# Patient Record
Sex: FEMALE | Race: WHITE | NOT HISPANIC OR LATINO | Employment: OTHER | URBAN - METROPOLITAN AREA
[De-identification: names, ages, dates, MRNs, and addresses within clinical notes are randomized per-mention and may not be internally consistent; named-entity substitution may affect disease eponyms.]

---

## 2017-03-29 ENCOUNTER — OFFICE VISIT (OUTPATIENT)
Dept: INTERNAL MEDICINE | Facility: MEDICAL CENTER | Age: 62
End: 2017-03-29
Payer: MEDICARE

## 2017-03-29 VITALS
DIASTOLIC BLOOD PRESSURE: 72 MMHG | OXYGEN SATURATION: 97 % | HEIGHT: 61 IN | HEART RATE: 78 BPM | BODY MASS INDEX: 22.28 KG/M2 | SYSTOLIC BLOOD PRESSURE: 96 MMHG | WEIGHT: 118 LBS | TEMPERATURE: 97.3 F

## 2017-03-29 DIAGNOSIS — R21 RASH, SKIN: ICD-10-CM

## 2017-03-29 DIAGNOSIS — B36.9 FUNGAL DERMATITIS: ICD-10-CM

## 2017-03-29 DIAGNOSIS — F20.0 PARANOID SCHIZOPHRENIA (HCC): ICD-10-CM

## 2017-03-29 DIAGNOSIS — D05.92 CARCINOMA IN SITU OF LEFT BREAST: ICD-10-CM

## 2017-03-29 PROCEDURE — G8420 CALC BMI NORM PARAMETERS: HCPCS | Mod: GC | Performed by: INTERNAL MEDICINE

## 2017-03-29 PROCEDURE — 3017F COLORECTAL CA SCREEN DOC REV: CPT | Mod: GC | Performed by: INTERNAL MEDICINE

## 2017-03-29 PROCEDURE — 3014F SCREEN MAMMO DOC REV: CPT | Mod: GC | Performed by: INTERNAL MEDICINE

## 2017-03-29 PROCEDURE — G8432 DEP SCR NOT DOC, RNG: HCPCS | Mod: GC | Performed by: INTERNAL MEDICINE

## 2017-03-29 PROCEDURE — 99213 OFFICE O/P EST LOW 20 MIN: CPT | Mod: GE | Performed by: INTERNAL MEDICINE

## 2017-03-29 PROCEDURE — G8482 FLU IMMUNIZE ORDER/ADMIN: HCPCS | Mod: GC | Performed by: INTERNAL MEDICINE

## 2017-03-29 PROCEDURE — 1036F TOBACCO NON-USER: CPT | Mod: GC | Performed by: INTERNAL MEDICINE

## 2017-03-29 RX ORDER — KETOCONAZOLE 20 MG/G
CREAM TOPICAL
Qty: 1 TUBE | Refills: 2 | Status: SHIPPED | OUTPATIENT
Start: 2017-03-29 | End: 2017-05-24 | Stop reason: SDUPTHER

## 2017-03-29 RX ORDER — DOCUSATE CALCIUM 240 MG
CAPSULE ORAL
Refills: 2 | COMMUNITY
Start: 2017-03-03 | End: 2017-03-29

## 2017-03-29 ASSESSMENT — ENCOUNTER SYMPTOMS
CONSTITUTIONAL NEGATIVE: 1
CARDIOVASCULAR NEGATIVE: 1
GASTROINTESTINAL NEGATIVE: 1
RESPIRATORY NEGATIVE: 1
MUSCULOSKELETAL NEGATIVE: 1
EYES NEGATIVE: 1

## 2017-03-29 ASSESSMENT — PATIENT HEALTH QUESTIONNAIRE - PHQ9: CLINICAL INTERPRETATION OF PHQ2 SCORE: 1

## 2017-03-29 ASSESSMENT — PAIN SCALES - GENERAL: PAINLEVEL: NO PAIN

## 2017-03-29 NOTE — MR AVS SNAPSHOT
"        Gena Dykes   3/29/2017 2:30 PM   Office Visit   MRN: [id number]    Department:  [facility] Med - Internal Med   Dept Phone:  [phone]    Description:  Female : 1955   Provider:  Mitch Honeycutt M.D.           Reason for Visit     Gynecologic Exam pap smear       Allergies as of 3/29/2017     Allergen Noted Reactions    Erythromycin 2012       Pt on Clozapine/no erythromycin    Haldol [Haloperidol Lactate] 2012       Does not tolerate    Prolixin [Fluphenazine] 2016         You were diagnosed with     Fungal dermatitis   [2001]       Rash, skin   [569317]       Carcinoma in situ of left breast   [228640]       Paranoid schizophrenia (CMS-HCC)   [856009]         Vital Signs     Blood Pressure Pulse Temperature Height Weight Body Mass Index    96/72 mmHg 78 36.3 °C (97.3 °F) 1.549 m (5' 1\") 53.524 kg (118 lb) 22.31 kg/m2    Oxygen Saturation Breastfeeding? Smoking Status             97% No Former Smoker         Basic Information     Date Of Birth Sex Race Ethnicity Preferred Language    1955 Female White Non- English      Your appointments     May 03, 2017 10:45 AM   ANNUAL EXAM PREVENTATIVE with Mitch Honeycutt M.D.   [facility] Med - Internal Medicine (--)    [address] [place] [id number] [phone]              Problem List              ICD-10-CM Priority Class Noted - Resolved    Paranoid schizophrenia (CMS-HCC) F20.0   2016 - Present    Carcinoma in situ of breast D05.90   2016 - Present    Hypercholesterolemia E78.00   2016 - Present    Loss of weight R63.4   2016 - Present    Rash, skin R21   2016 - Present      Health Maintenance        Date Due Completion Dates    PAP SMEAR 1977 ---    IMM ZOSTER VACCINE 2016 ---    MAMMOGRAM 2017 (Done)    Override on 2016: Done (Result pending at this time.)    IMM DTaP/Tdap/Td Vaccine (2 - Td) 2020   " COLONOSCOPY 6/17/2023 6/17/2013 (Done), 11/20/2012    Override on 6/17/2013: Done (Per pt, her last colonoscopy was about 3 yrs ago. It was wnl.)            Current Immunizations     Influenza Vaccine Quad Inj (Preserved) 10/20/2016    Tdap Vaccine 9/23/2010      Below and/or attached are the medications your provider expects you to take. Review all of your home medications and newly ordered medications with your provider and/or pharmacist. Follow medication instructions as directed by your provider and/or pharmacist. Please keep your medication list with you and share with your provider. Update the information when medications are discontinued, doses are changed, or new medications (including over-the-counter products) are added; and carry medication information at all times in the event of emergency situations     Allergies:  ERYTHROMYCIN - (reactions not documented)     HALDOL - (reactions not documented)     PROLIXIN - (reactions not documented)               Medications  Valid as of: March 29, 2017 -  3:07 PM    Generic Name Brand Name Tablet Size Instructions for use    Ascorbic Acid (Tab) ascorbic acid 500 MG Take 500 mg by mouth every day.        Asenapine Maleate (SL Tab) Asenapine Maleate 10 MG Place 10 mg under tongue 2 times a day.        Cholecalciferol (Tab) cholecalciferol 1000 UNIT Take 1,000 Units by mouth every day.        CloZAPine (Tab) CLOZARIL 100 MG Take 100 mg by mouth 2 times a day. Indications: two tabs every morning and 4 tabs every bedtime        Docusate Sodium (Cap) COLACE 100 MG Take 100 mg by mouth 2 times a day.        Ketoconazole (Cream) NIZORAL 2 % Apply  to affected area(s) every day.        Multiple Vitamins-Minerals (Tab) THERAGRAN-M  Take 1 Tab by mouth every day.        Omega-3 Fatty Acids (Cap) OMEGA 3 FA 1000 MG Take 1,000 mg by mouth 3 times a day, with meals.        Polyethylene Glycol 3350   Take  by mouth.        .                 Medicines prescribed today were sent  to:     [facility] - [place] - [address]    [address] [place] [address]    Phone: [phone] Fax: [phone]    Open 24 Hours?: No      Medication refill instructions:       If your prescription bottle indicates you have medication refills left, it is not necessary to call your provider’s office. Please contact your pharmacy and they will refill your medication.    If your prescription bottle indicates you do not have any refills left, you may request refills at any time through one of the following ways: The online [employer] system (except Urgent Care), by calling your provider’s office, or by asking your pharmacy to contact your provider’s office with a refill request. Medication refills are processed only during regular business hours and may not be available until the next business day. Your provider may request additional information or to have a follow-up visit with you prior to refilling your medication.   *Please Note: Medication refills are assigned a new Rx number when refilled electronically. Your pharmacy may indicate that no refills were authorized even though a new prescription for the same medication is available at the pharmacy. Please request the medicine by name with the pharmacy before contacting your provider for a refill.        Instructions    RTC in 5 wks.   Labs before next visit.           [employer] Status: Patient Declined

## 2017-03-29 NOTE — PROGRESS NOTES
Established Patient    Gena presents today with the following:    CC:  Rash    HPI:    Fungal dermatitis- Ms. Dykes c/o rash on her left thigh.   Rash is non pruritic or not painful.   Rash is erythematous, non pruritic, not painful. She states that she doesn't shower or change clothes after sweating from exercise.      Carcinoma in situ of Breast of the left breast-  Followed by Dr. Cervantes at [facility].   Stopped anastrazole.   Follow up mammogram scheduled next week.  No recurrence on prior mammogram.  Patient will follow up with Dr. Cervantes in October.     Paranoid Schizophrenia- Followed by Psychiatrist.  Jak.  Pt lives in boarding home.  Accompanied by caregiver.    HM:          Colonoscopy: 2012: Hyperplastic polyp removed.           Mammogram- UTD         Tetanus shot: less than 5 yrs ago          Pap smear- Next visit. Patient is not ready for pap this time    Patient Active Problem List    Diagnosis Date Noted   • Rash, skin 06/17/2016   • Paranoid schizophrenia (CMS-[facility]) 04/29/2016   • Carcinoma in situ of breast 04/29/2016   • Hypercholesterolemia 04/29/2016   • Loss of weight 04/29/2016       Current Outpatient Prescriptions   Medication Sig Dispense Refill   • ketoconazole (NIZORAL) 2 % Cream Apply  to affected area(s) every day. 1 Tube 2   • Asenapine Maleate (SAPHRIS) 10 MG SL Tab Place 10 mg under tongue 2 times a day.     • Polyethylene Glycol 3350 (MIRALAX PO) Take  by mouth.     • clozapine (CLOZARIL) 100 MG TABS Take 100 mg by mouth 2 times a day. Indications: two tabs every morning and 4 tabs every bedtime     • vitamin D (CHOLECALCIFEROL) 1000 UNIT TABS Take 1,000 Units by mouth every day.     • docusate sodium (COLACE) 100 MG CAPS Take 100 mg by mouth 2 times a day.     • docosahexanoic acid (OMEGA 3 FA) 1000 MG CAPS Take 1,000 mg by mouth 3 times a day, with meals.     • ascorbic acid (ASCORBIC ACID) 500 MG TABS Take 500 mg by mouth every day.     • therapeutic  "multivitamin-minerals (THERAGRAN-M) TABS Take 1 Tab by mouth every day.       No current facility-administered medications for this visit.       ROS: As per HPI. Additional pertinent symptoms as noted below.      Review of Systems   Constitutional: Negative.    HENT: Negative.    Eyes: Negative.    Respiratory: Negative.    Cardiovascular: Negative.    Gastrointestinal: Negative.    Genitourinary: Negative.    Musculoskeletal: Negative.    Skin: Positive for rash. Negative for itching.   Endo/Heme/Allergies: Negative.        BP 96/72 mmHg  Pulse 78  Temp(Src) 36.3 °C (97.3 °F)  Ht 1.549 m (5' 1\")  Wt 53.524 kg (118 lb)  BMI 22.31 kg/m2  SpO2 97%  Breastfeeding? No    Physical Exam   Constitutional:  oriented to person, place, and time. No distress.   Eyes: Pupils are equal, round, and reactive to light. No scleral icterus.  Neck: Neck supple.  Cardiovascular: Normal rate, regular rhythm and normal heart sounds.  Exam reveals no gallop and no friction rub.  No murmur heard.  Pulmonary/Chest: Breath sounds normal. Chest wall is not dull to percussion.   Musculoskeletal:   no edema.   Abdomen:  Soft, BS+, Non tender, non distended  Neurological: alert and oriented to person, place, and time.   Skin:  Erythematous, round flat rash on the left thigh      Assessment and Plan    1. Fungal dermatitis  - Patient was advised to keep skin clean and dry after exercising  - Will start Ketoconazole  - ketoconazole (NIZORAL) 2 % Cream; Apply  to affected area(s) every day.  Dispense: 1 Tube; Refill: 2  - CTM    2. Carcinoma in situ of left breast  - Completed Anastrazole  - No recurrence on repeat mammogram  - Next mammogram due in 1 wk  - Followed by Dr. Cervantes at [facility]  - Will defer to Dr. Cervantes    4. Paranoid schizophrenia (CMS-HCC)  - Accompanied by her caregiver.   - Lives in boarding home  - Defer to psychiatry  - CTM    5. HCM  HM:          Colonoscopy: 2012: Hyperplastic polyp removed.           Mammogram- " UTD         Tetanus shot: less than 5 yrs ago          Pap smear- Next visit. Patient is not ready for pap this time  - Plan:  Pap next visit          Followup: Return in about 5 weeks (around 5/3/2017) for Long.      Signed by: Mitch Honeycutt M.D.

## 2017-04-04 LAB
ALBUMIN SERPL-MCNC: 3.9 G/DL (ref 3.6–4.8)
ALBUMIN/GLOB SERPL: 1.5 {RATIO} (ref 1.2–2.2)
ALP SERPL-CCNC: 87 IU/L (ref 39–117)
ALT SERPL-CCNC: 8 IU/L (ref 0–32)
AST SERPL-CCNC: 21 IU/L (ref 0–40)
BASOPHILS # BLD AUTO: 0 X10E3/UL (ref 0–0.2)
BASOPHILS NFR BLD AUTO: 1 %
BILIRUB SERPL-MCNC: 0.2 MG/DL (ref 0–1.2)
BUN SERPL-MCNC: 15 MG/DL (ref 8–27)
BUN/CREAT SERPL: 19 (ref 12–28)
CALCIUM SERPL-MCNC: 9 MG/DL (ref 8.7–10.3)
CHLORIDE SERPL-SCNC: 100 MMOL/L (ref 96–106)
CHOLEST SERPL-MCNC: 215 MG/DL (ref 100–199)
CO2 SERPL-SCNC: 23 MMOL/L (ref 18–29)
COMMENT 011824: ABNORMAL
CREAT SERPL-MCNC: 0.78 MG/DL (ref 0.57–1)
EOSINOPHIL # BLD AUTO: 0.5 X10E3/UL (ref 0–0.4)
EOSINOPHIL NFR BLD AUTO: 8 %
ERYTHROCYTE [DISTWIDTH] IN BLOOD BY AUTOMATED COUNT: 14.4 % (ref 12.3–15.4)
GLOBULIN SER CALC-MCNC: 2.6 G/DL (ref 1.5–4.5)
GLUCOSE SERPL-MCNC: 96 MG/DL (ref 65–99)
HCT VFR BLD AUTO: 42.2 % (ref 34–46.6)
HDLC SERPL-MCNC: 69 MG/DL
HGB BLD-MCNC: 13.7 G/DL (ref 11.1–15.9)
IMM GRANULOCYTES # BLD: 0 X10E3/UL (ref 0–0.1)
IMM GRANULOCYTES NFR BLD: 0 %
IMMATURE CELLS  115398: ABNORMAL
LDLC SERPL CALC-MCNC: 135 MG/DL (ref 0–99)
LYMPHOCYTES # BLD AUTO: 1.5 X10E3/UL (ref 0.7–3.1)
LYMPHOCYTES NFR BLD AUTO: 26 %
MCH RBC QN AUTO: 31.1 PG (ref 26.6–33)
MCHC RBC AUTO-ENTMCNC: 32.5 G/DL (ref 31.5–35.7)
MCV RBC AUTO: 96 FL (ref 79–97)
MONOCYTES # BLD AUTO: 0.4 X10E3/UL (ref 0.1–0.9)
MONOCYTES NFR BLD AUTO: 8 %
MORPHOLOGY BLD-IMP: ABNORMAL
NEUTROPHILS # BLD AUTO: 3.3 X10E3/UL (ref 1.4–7)
NEUTROPHILS NFR BLD AUTO: 57 %
NRBC BLD AUTO-RTO: ABNORMAL %
PLATELET # BLD AUTO: 253 X10E3/UL (ref 150–379)
POTASSIUM SERPL-SCNC: 4.3 MMOL/L (ref 3.5–5.2)
PROT SERPL-MCNC: 6.5 G/DL (ref 6–8.5)
RBC # BLD AUTO: 4.4 X10E6/UL (ref 3.77–5.28)
SODIUM SERPL-SCNC: 138 MMOL/L (ref 134–144)
TRIGL SERPL-MCNC: 55 MG/DL (ref 0–149)
TSH SERPL DL<=0.005 MIU/L-ACNC: 1.78 UIU/ML (ref 0.45–4.5)
VLDLC SERPL CALC-MCNC: 11 MG/DL (ref 5–40)
WBC # BLD AUTO: 5.8 X10E3/UL (ref 3.4–10.8)

## 2017-05-03 ENCOUNTER — OFFICE VISIT (OUTPATIENT)
Dept: INTERNAL MEDICINE | Facility: MEDICAL CENTER | Age: 62
End: 2017-05-03
Payer: MEDICARE

## 2017-05-03 VITALS
DIASTOLIC BLOOD PRESSURE: 70 MMHG | SYSTOLIC BLOOD PRESSURE: 100 MMHG | WEIGHT: 118.25 LBS | TEMPERATURE: 97.7 F | BODY MASS INDEX: 22.33 KG/M2 | OXYGEN SATURATION: 95 % | HEART RATE: 97 BPM | HEIGHT: 61 IN

## 2017-05-03 DIAGNOSIS — F20.0 PARANOID SCHIZOPHRENIA (HCC): ICD-10-CM

## 2017-05-03 DIAGNOSIS — B36.9 DERMATITIS FUNGAL: ICD-10-CM

## 2017-05-03 DIAGNOSIS — D05.92 CARCINOMA IN SITU OF LEFT BREAST, UNSPECIFIED TYPE: ICD-10-CM

## 2017-05-03 DIAGNOSIS — K62.89 RECTAL MASS: ICD-10-CM

## 2017-05-03 DIAGNOSIS — Z01.419 ENCOUNTER FOR CERVICAL PAP SMEAR WITH PELVIC EXAM: ICD-10-CM

## 2017-05-03 PROCEDURE — 99000 SPECIMEN HANDLING OFFICE-LAB: CPT | Performed by: INTERNAL MEDICINE

## 2017-05-03 PROCEDURE — G8420 CALC BMI NORM PARAMETERS: HCPCS | Mod: GC | Performed by: INTERNAL MEDICINE

## 2017-05-03 PROCEDURE — 3017F COLORECTAL CA SCREEN DOC REV: CPT | Mod: GC | Performed by: INTERNAL MEDICINE

## 2017-05-03 PROCEDURE — 3014F SCREEN MAMMO DOC REV: CPT | Mod: GC | Performed by: INTERNAL MEDICINE

## 2017-05-03 PROCEDURE — 99214 OFFICE O/P EST MOD 30 MIN: CPT | Mod: 25,GC | Performed by: INTERNAL MEDICINE

## 2017-05-03 PROCEDURE — G8432 DEP SCR NOT DOC, RNG: HCPCS | Mod: GC | Performed by: INTERNAL MEDICINE

## 2017-05-03 PROCEDURE — 1036F TOBACCO NON-USER: CPT | Mod: GC | Performed by: INTERNAL MEDICINE

## 2017-05-03 ASSESSMENT — ENCOUNTER SYMPTOMS
WEIGHT LOSS: 0
CHILLS: 0
ABDOMINAL PAIN: 0
CONSTIPATION: 0
HEADACHES: 0
NEUROLOGICAL NEGATIVE: 1
SHORTNESS OF BREATH: 0
MUSCULOSKELETAL NEGATIVE: 1
FEVER: 0
COUGH: 0
BLOOD IN STOOL: 0
DIARRHEA: 0
EYES NEGATIVE: 1
CARDIOVASCULAR NEGATIVE: 1
PSYCHIATRIC NEGATIVE: 1

## 2017-05-03 NOTE — MR AVS SNAPSHOT
"        Gena Dykes   5/3/2017 10:45 AM   Office Visit   MRN: [id number]    Department:  [facility] Med - Internal Med   Dept Phone:  [phone]    Description:  Female : 1955   Provider:  Mitch Honeycutt M.D.           Reason for Visit     Gynecologic Exam           Allergies as of 5/3/2017     Allergen Noted Reactions    Erythromycin 2012       Pt on Clozapine/no erythromycin    Haldol [Haloperidol Lactate] 2012       Does not tolerate    Prolixin [Fluphenazine] 2016         You were diagnosed with     Rectal mass   [359493]       Dermatitis fungal   [089581]       Healthcare maintenance   [310670]         Vital Signs     Blood Pressure Pulse Temperature Height Weight Body Mass Index    100/70 mmHg 97 36.5 °C (97.7 °F) 1.549 m (5' 0.98\") 53.638 kg (118 lb 4 oz) 22.35 kg/m2    Oxygen Saturation Smoking Status                95% Former Smoker          Basic Information     Date Of Birth Sex Race Ethnicity Preferred Language    1955 Female White Non- English      Your appointments     2017 11:15 AM   Established Patient with Mitch Honeycutt M.D.   [facility] Med - Internal Medicine (--)    [address] [place] [id number] [phone]           You will be receiving a confirmation call a few days before your appointment from our automated call confirmation system.              Problem List              ICD-10-CM Priority Class Noted - Resolved    Paranoid schizophrenia (CMS-HCC) F20.0   2016 - Present    Carcinoma in situ of breast D05.90   2016 - Present    Hypercholesterolemia E78.00   2016 - Present    Loss of weight R63.4   2016 - Present    Rash, skin R21   2016 - Present      Health Maintenance        Date Due Completion Dates    PAP SMEAR 1977 ---    IMM ZOSTER VACCINE 2016 ---    MAMMOGRAM 2017 (Done)    Override on 2016: Done (Result pending at this time.)   " IMM DTaP/Tdap/Td Vaccine (2 - Td) 9/23/2020 9/23/2010    COLONOSCOPY 6/17/2023 6/17/2013 (Done), 11/20/2012    Override on 6/17/2013: Done (Per pt, her last colonoscopy was about 3 yrs ago. It was wnl.)            Current Immunizations     Influenza Vaccine Quad Inj (Preserved) 10/20/2016    Tdap Vaccine 9/23/2010      Below and/or attached are the medications your provider expects you to take. Review all of your home medications and newly ordered medications with your provider and/or pharmacist. Follow medication instructions as directed by your provider and/or pharmacist. Please keep your medication list with you and share with your provider. Update the information when medications are discontinued, doses are changed, or new medications (including over-the-counter products) are added; and carry medication information at all times in the event of emergency situations     Allergies:  ERYTHROMYCIN - (reactions not documented)     HALDOL - (reactions not documented)     PROLIXIN - (reactions not documented)               Medications  Valid as of: May 03, 2017 - 12:19 PM    Generic Name Brand Name Tablet Size Instructions for use    Ascorbic Acid (Tab) ascorbic acid 500 MG Take 500 mg by mouth every day.        Asenapine Maleate (SL Tab) Asenapine Maleate 10 MG Place 10 mg under tongue 2 times a day.        Calcium Carb-Cholecalciferol   Take 1 Tab by mouth 2 Times a Day.        Cholecalciferol (Tab) cholecalciferol 1000 UNIT Take 1,000 Units by mouth every day.        CloZAPine (Tab) CLOZARIL 100 MG Take 100 mg by mouth 2 times a day. Indications: two tabs every morning and 4 tabs every bedtime        Docusate Sodium (Cap) COLACE 100 MG Take 240 mg by mouth 2 times a day.        Ketoconazole (Cream) NIZORAL 2 % Apply  to affected area(s) every day.        Multiple Vitamins-Minerals (Tab) THERAGRAN-M  Take 1 Tab by mouth every day.        Omega-3 Fatty Acids (Cap) OMEGA 3 FA 1000 MG Take 1,560 mg by mouth every day.         Polyethylene Glycol 3350   Take  by mouth.        .                 Medicines prescribed today were sent to:     [facility] - [place] - [address]    [address] [place] [address]    Phone: [phone] Fax: [phone]    Open 24 Hours?: No      Medication refill instructions:       If your prescription bottle indicates you have medication refills left, it is not necessary to call your provider’s office. Please contact your pharmacy and they will refill your medication.    If your prescription bottle indicates you do not have any refills left, you may request refills at any time through one of the following ways: The online [employer] system (except Urgent Care), by calling your provider’s office, or by asking your pharmacy to contact your provider’s office with a refill request. Medication refills are processed only during regular business hours and may not be available until the next business day. Your provider may request additional information or to have a follow-up visit with you prior to refilling your medication.   *Please Note: Medication refills are assigned a new Rx number when refilled electronically. Your pharmacy may indicate that no refills were authorized even though a new prescription for the same medication is available at the pharmacy. Please request the medicine by name with the pharmacy before contacting your provider for a refill.        Your To Do List     Future Labs/Procedures Complete By Expires    THINPREP PAP WITH HPV  As directed 5/3/2018      Referral     A referral request has been sent to our patient care coordination department. Please allow 3-5 business days for us to process this request and contact you either by phone or mail. If you do not hear from us by the 5th business day, please call us at [phone].        Instructions    F          [employer] Status: Patient Declined

## 2017-05-11 LAB
CYTOLOGIST CVX/VAG CYTO: NORMAL
DX ICD CODE: NORMAL
HPV I/H RISK 1 DNA CVX QL PROBE+SIG AMP: NEGATIVE
NOTE  190109: NORMAL
OTHER STN SPEC: NORMAL
PATH REPORT.FINAL DX SPEC: NORMAL
QC REVIEWED BY  191128: NORMAL
STAT OF ADQ CVX/VAG CYTO-IMP: NORMAL

## 2017-05-25 RX ORDER — KETOCONAZOLE 20 MG/G
CREAM TOPICAL
Qty: 30 G | Refills: 0 | Status: SHIPPED | OUTPATIENT
Start: 2017-05-25 | End: 2018-11-05

## 2017-05-25 NOTE — TELEPHONE ENCOUNTER
Last seen: 05/03/17 by Dr. Honeycutt  Next appt: 06/07/17 with Dr. Honeycutt    Was the patient seen in the last year in this department? Yes   Does patient have an active prescription for medications requested? No   Received Request Via: Pharmacy

## 2017-07-03 NOTE — PROGRESS NOTES
"Patient here with her caregiver and emergency contact, Radha.  I was notified by the EKG tech after the pt had left that the patient had bed bugs and bites on her leg.  Supervisor/manager/environmental services notified and appropriate steps taken.  I called Linsey, surgery scheduler, at Dr. Colin' office to update her on patient's status and to notify that patient must be treated before she can return to surgery. She understands and states that she will speak to Dr. Colin. I called patient and spoke to her caregiver Radha. I notified Radha that there was a bed bug that came off of the patient and that she reported bites.  Radha reports that Gena \"told me when we got into the car that she had bed bugs.  She got them from a bag that she brought home from [facility].\"  I told Radha/patient to call MD for further instructions and that patient must be treated before she can return to the hospital for her surgery.  Radha/pt understand.  "

## 2017-07-05 ENCOUNTER — OFFICE VISIT (OUTPATIENT)
Dept: INTERNAL MEDICINE | Facility: MEDICAL CENTER | Age: 62
End: 2017-07-05
Payer: MEDICARE

## 2017-07-05 VITALS
WEIGHT: 118 LBS | BODY MASS INDEX: 22.28 KG/M2 | DIASTOLIC BLOOD PRESSURE: 66 MMHG | HEIGHT: 61 IN | TEMPERATURE: 98.9 F | HEART RATE: 89 BPM | OXYGEN SATURATION: 94 % | RESPIRATION RATE: 16 BRPM | SYSTOLIC BLOOD PRESSURE: 102 MMHG

## 2017-07-05 DIAGNOSIS — R59.0 LYMPHADENOPATHY, INGUINAL: ICD-10-CM

## 2017-07-05 DIAGNOSIS — Z00.8 ENCOUNTER FOR OTHER GENERAL EXAMINATION: ICD-10-CM

## 2017-07-05 DIAGNOSIS — R21 RASH, SKIN: ICD-10-CM

## 2017-07-05 DIAGNOSIS — K62.89 RECTAL MASS: ICD-10-CM

## 2017-07-05 PROCEDURE — 99214 OFFICE O/P EST MOD 30 MIN: CPT | Mod: GC | Performed by: INTERNAL MEDICINE

## 2017-07-05 ASSESSMENT — PAIN SCALES - GENERAL: PAINLEVEL: NO PAIN

## 2017-07-05 NOTE — MR AVS SNAPSHOT
"        Gena Dykes   2017 1:15 PM   Office Visit   MRN: [id number]    Department:  [facility] - Internal Med   Dept Phone:  [phone]    Description:  Female : 1955   Provider:  Song Malin M.D.           Reason for Visit     Bug Bite both legs lower legs      Allergies as of 2017     Allergen Noted Reactions    Erythromycin 2012       Pt on Clozapine/no erythromycin    Haldol [Haloperidol Lactate] 2012       Does not tolerate    Prolixin [Fluphenazine] 2016       hallucinations      You were diagnosed with     Rash, skin   [808673]       Lymphadenopathy, inguinal   [940249]       Rectal mass   [876941]       Encounter for other general examination   [9711878]         Vital Signs     Blood Pressure Pulse Temperature Respirations Height Weight    102/66 mmHg 89 37.2 °C (98.9 °F) 16 1.549 m (5' 1\") 53.524 kg (118 lb)    Body Mass Index Oxygen Saturation Last Menstrual Period Smoking Status          22.31 kg/m2 94% 2000 (Approximate) Former Smoker        Basic Information     Date Of Birth Sex Race Ethnicity Preferred Language    1955 Female White Non- English      Problem List              ICD-10-CM Priority Class Noted - Resolved    Paranoid schizophrenia (CMS-HCC) F20.0   2016 - Present    Carcinoma in situ of breast D05.90   2016 - Present    Hypercholesterolemia E78.00   2016 - Present    Loss of weight R63.4   2016 - Present    Rash, skin R21   2016 - Present      Health Maintenance        Date Due Completion Dates    IMM ZOSTER VACCINE 2016 ---    MAMMOGRAM 2017 (Done)    Override on 2016: Done (Result pending at this time.)    IMM INFLUENZA (1) 2017 10/20/2016    PAP SMEAR 5/3/2020 5/3/2017, 5/3/2017    IMM DTaP/Tdap/Td Vaccine (2 - Td) 2020    COLONOSCOPY 2023 (Done), 2012    Override on 2013: Done (Per pt, her last colonoscopy was about 3 yrs " ago. It was wnl.)            Current Immunizations     Influenza Vaccine Quad Inj (Preserved) 10/20/2016    Tdap Vaccine 9/23/2010      Below and/or attached are the medications your provider expects you to take. Review all of your home medications and newly ordered medications with your provider and/or pharmacist. Follow medication instructions as directed by your provider and/or pharmacist. Please keep your medication list with you and share with your provider. Update the information when medications are discontinued, doses are changed, or new medications (including over-the-counter products) are added; and carry medication information at all times in the event of emergency situations     Allergies:  ERYTHROMYCIN - (reactions not documented)     HALDOL - (reactions not documented)     PROLIXIN - (reactions not documented)               Medications  Valid as of: July 05, 2017 -  2:19 PM    Generic Name Brand Name Tablet Size Instructions for use    Asenapine Maleate (SL Tab) Asenapine Maleate 10 MG Place 10 mg under tongue 2 times a day.        Calcium Carb-Cholecalciferol   Take 1 Tab by mouth 2 Times a Day.        CloZAPine (Tab) CLOZARIL 100 MG Take 100 mg by mouth 2 times a day. Indications: two tabs every morning and 4 tabs every bedtime        Docusate Sodium (Cap) COLACE 100 MG Take 240 mg by mouth 2 times a day.        Ketoconazole (Cream) NIZORAL 2 % APPLY TOPICALLY TO AFFECTED AREA(S) ONCE DAILY        Multiple Vitamins-Minerals (Tab) THERAGRAN-M  Take 1 Tab by mouth every day.        Polyethylene Glycol 3350   Take  by mouth.        .                 Medicines prescribed today were sent to:     [facility] - [place] - [address]    [address] [place] [address]    Phone: [phone] Fax: [phone]    Open 24 Hours?: No      Medication refill instructions:       If your prescription bottle indicates you have medication refills left, it is not necessary to call  "your provider’s office. Please contact your pharmacy and they will refill your medication.    If your prescription bottle indicates you do not have any refills left, you may request refills at any time through one of the following ways: The online [employer] system (except Urgent Care), by calling your provider’s office, or by asking your pharmacy to contact your provider’s office with a refill request. Medication refills are processed only during regular business hours and may not be available until the next business day. Your provider may request additional information or to have a follow-up visit with you prior to refilling your medication.   *Please Note: Medication refills are assigned a new Rx number when refilled electronically. Your pharmacy may indicate that no refills were authorized even though a new prescription for the same medication is available at the pharmacy. Please request the medicine by name with the pharmacy before contacting your provider for a refill.        Instructions    Please buy at [facility], \"sting nitish to apply to skin lesions          MyChart Status: Patient Declined        "

## 2017-07-05 NOTE — PROGRESS NOTES
Established Patient    Gena presents today with the following:    CC: pin point rash on ventral part of both of legs, Surgical Clearance    HPI: 61 Y old female with past medical history of breast cancer, schizophrenia, and chronic constipation, who presented to the clinic for consultation about a pin point rash on the ventral part of both of her legs and also for surgical clearance. The patient has no itching, bleeding or any other specific complaint, she want to get a surgical clearance for having a bilateral inguinal lymph node biopsy on friday 7/7/2017. The patient feels fine and she is update on her follow up with her physicians, the patient denies chest pain, respiratory difficulty, headache, vision problem, altered bowel motion or difficulty in urination.    Patient Active Problem List    Diagnosis Date Noted   • Rash, skin 06/17/2016   • Paranoid schizophrenia (CMS-HCC) 04/29/2016   • Carcinoma in situ of breast 04/29/2016   • Hypercholesterolemia 04/29/2016   • Loss of weight 04/29/2016       Current Outpatient Prescriptions   Medication Sig Dispense Refill   • ketoconazole (NIZORAL) 2 % Cream APPLY TOPICALLY TO AFFECTED AREA(S) ONCE DAILY 30 g 0   • Calcium Carb-Cholecalciferol (CALCIUM 600 + D PO) Take 1 Tab by mouth 2 Times a Day.     • Asenapine Maleate (SAPHRIS) 10 MG SL Tab Place 10 mg under tongue 2 times a day.     • Polyethylene Glycol 3350 (MIRALAX PO) Take  by mouth.     • clozapine (CLOZARIL) 100 MG TABS Take 100 mg by mouth 2 times a day. Indications: two tabs every morning and 4 tabs every bedtime     • docusate sodium (COLACE) 100 MG CAPS Take 240 mg by mouth 2 times a day.     • therapeutic multivitamin-minerals (THERAGRAN-M) TABS Take 1 Tab by mouth every day.       No current facility-administered medications for this visit.       ROS: As per HPI. Additional pertinent symptoms as noted below.    All others negative    /66 mmHg  Pulse 89  Temp(Src) 37.2 °C (98.9 °F)  Resp 16  " Ht 1.549 m (5' 1\")  Wt 53.524 kg (118 lb)  BMI 22.31 kg/m2  SpO2 94%  LMP 07/03/2000 (Approximate)    Physical Exam   Constitutional:  oriented to person, place, and time. No distress.   Eyes: Pupils are equal, round, and reactive to light. No scleral icterus.  Neck: Neck supple. No thyromegaly present.   Cardiovascular: Normal rate, regular rhythm and normal heart sounds.  Exam reveals no gallop and no friction rub.  No murmur heard.  Pulmonary/Chest: Breath sounds normal. Chest wall is not dull to percussion.   Musculoskeletal:   no edema.   Lymphadenopathy: no cervical adenopathy  Neurological: alert and oriented to person, place, and time.   Skin: No cyanosis. Nails show no clubbing, pin point rash on the ventral part of both legs.  Other: bilateral inguinal lymphadenopathy, perianal cauliflower mass.      Assessment and Plan    1. Rash, skin  pin point rash on the ventral part of both legs. No itching or bleeding, the rash seems to be handled by the patient very well, the rash does not preclude the surgical biopsy.    2. Lymphadenopathy, inguinal  bilateral inguinal lymphadenopathy. Maybe benign perhaps not, it might be because of the perianal mass or because of a metastasis from breast cancer. we are waiting for the pathology results after the surgical biopsy.    3. Rectal mass  perianal cauliflower mass of about 2.5 inch in diameter, it is circular with rough edges and located mainly on the left perianal area, fleshy red in color, no infection or bleeding. This mass might be a human papilloma virus infection or one of its complications as squamous cell carcinoma. The future plan for this mass will be decided according to the results of the inguinal lymph node biopsy.    4. Encounter for other general examination  The patient have been seen and her heart and lung are in good condition, nothing to preclude the patient from having general anesthesia for the surgery.    Followup: Return if symptoms worsen " or fail to improve.      Signed by: Song Malin M.D.

## 2017-07-14 ENCOUNTER — TELEPHONE (OUTPATIENT)
Dept: INTERNAL MEDICINE | Facility: MEDICAL CENTER | Age: 62
End: 2017-07-14

## 2017-07-14 NOTE — TELEPHONE ENCOUNTER
Radha called stating that pt was supposed to have a letter clearing her for the biopsy on Tuesday.  She was seen 7/5/17 for bug bites/rash on legs.  Can you please write clearance.

## 2017-07-14 NOTE — Clinical Note
To whom may concern,    The patient have been examined on 7/5/17. Labs and imaging results were reviewed. Patient has no medical problem that precludes her from having an inguinal lymph node biopsy as scheduled.     July 14, 2017              Song Malin MD  Electronically signed

## 2017-07-18 ENCOUNTER — HOSPITAL ENCOUNTER (OUTPATIENT)
Facility: MEDICAL CENTER | Age: 62
End: 2017-07-18
Attending: SURGERY | Admitting: SURGERY
Payer: MEDICARE

## 2017-07-18 VITALS
DIASTOLIC BLOOD PRESSURE: 78 MMHG | HEART RATE: 75 BPM | OXYGEN SATURATION: 99 % | TEMPERATURE: 98.5 F | BODY MASS INDEX: 20 KG/M2 | RESPIRATION RATE: 15 BRPM | SYSTOLIC BLOOD PRESSURE: 111 MMHG | HEIGHT: 63 IN | WEIGHT: 112.88 LBS

## 2017-07-18 PROBLEM — R59.0 BILATERAL HILAR ADENOPATHY SYNDROME: Status: ACTIVE | Noted: 2017-07-18

## 2017-07-18 PROCEDURE — 700102 HCHG RX REV CODE 250 W/ 637 OVERRIDE(OP)

## 2017-07-18 PROCEDURE — 160009 HCHG ANES TIME/MIN: Performed by: SURGERY

## 2017-07-18 PROCEDURE — 160035 HCHG PACU - 1ST 60 MINS PHASE I: Performed by: SURGERY

## 2017-07-18 PROCEDURE — 700111 HCHG RX REV CODE 636 W/ 250 OVERRIDE (IP)

## 2017-07-18 PROCEDURE — 160025 RECOVERY II MINUTES (STATS): Performed by: SURGERY

## 2017-07-18 PROCEDURE — 501838 HCHG SUTURE GENERAL: Performed by: SURGERY

## 2017-07-18 PROCEDURE — 160047 HCHG PACU  - EA ADDL 30 MINS PHASE II: Performed by: SURGERY

## 2017-07-18 PROCEDURE — 160002 HCHG RECOVERY MINUTES (STAT): Performed by: SURGERY

## 2017-07-18 PROCEDURE — 160046 HCHG PACU - 1ST 60 MINS PHASE II: Performed by: SURGERY

## 2017-07-18 PROCEDURE — 160028 HCHG SURGERY MINUTES - 1ST 30 MINS LEVEL 3: Performed by: SURGERY

## 2017-07-18 PROCEDURE — A9270 NON-COVERED ITEM OR SERVICE: HCPCS

## 2017-07-18 PROCEDURE — 302135 SEQUENTIAL COMPRESSION MACHINE: Performed by: SURGERY

## 2017-07-18 PROCEDURE — 500002 HCHG ADHESIVE, DERMABOND: Performed by: SURGERY

## 2017-07-18 PROCEDURE — 88331 PATH CONSLTJ SURG 1 BLK 1SPC: CPT

## 2017-07-18 PROCEDURE — 160039 HCHG SURGERY MINUTES - EA ADDL 1 MIN LEVEL 3: Performed by: SURGERY

## 2017-07-18 PROCEDURE — 501445 HCHG STAPLER, SKIN DISP: Performed by: SURGERY

## 2017-07-18 PROCEDURE — 160036 HCHG PACU - EA ADDL 30 MINS PHASE I: Performed by: SURGERY

## 2017-07-18 PROCEDURE — 160048 HCHG OR STATISTICAL LEVEL 1-5: Performed by: SURGERY

## 2017-07-18 PROCEDURE — 88305 TISSUE EXAM BY PATHOLOGIST: CPT

## 2017-07-18 PROCEDURE — 502240 HCHG MISC OR SUPPLY RC 0272: Performed by: SURGERY

## 2017-07-18 PROCEDURE — 700101 HCHG RX REV CODE 250

## 2017-07-18 RX ORDER — DIPHENHYDRAMINE HYDROCHLORIDE 50 MG/ML
25 INJECTION INTRAMUSCULAR; INTRAVENOUS EVERY 6 HOURS PRN
Status: DISCONTINUED | OUTPATIENT
Start: 2017-07-18 | End: 2017-07-18 | Stop reason: HOSPADM

## 2017-07-18 RX ORDER — SODIUM CHLORIDE, SODIUM LACTATE, POTASSIUM CHLORIDE, CALCIUM CHLORIDE 600; 310; 30; 20 MG/100ML; MG/100ML; MG/100ML; MG/100ML
1000 INJECTION, SOLUTION INTRAVENOUS
Status: COMPLETED | OUTPATIENT
Start: 2017-07-18 | End: 2017-07-18

## 2017-07-18 RX ORDER — TRAMADOL HYDROCHLORIDE 50 MG/1
TABLET ORAL
Status: COMPLETED
Start: 2017-07-18 | End: 2017-07-18

## 2017-07-18 RX ORDER — BUPIVACAINE HYDROCHLORIDE 5 MG/ML
INJECTION, SOLUTION PERINEURAL
Status: DISCONTINUED | OUTPATIENT
Start: 2017-07-18 | End: 2017-07-18 | Stop reason: HOSPADM

## 2017-07-18 RX ORDER — ONDANSETRON 2 MG/ML
4 INJECTION INTRAMUSCULAR; INTRAVENOUS EVERY 4 HOURS PRN
Status: DISCONTINUED | OUTPATIENT
Start: 2017-07-18 | End: 2017-07-18 | Stop reason: HOSPADM

## 2017-07-18 RX ORDER — LIDOCAINE HYDROCHLORIDE 10 MG/ML
0.5 INJECTION, SOLUTION INFILTRATION; PERINEURAL
Status: DISCONTINUED | OUTPATIENT
Start: 2017-07-18 | End: 2017-07-18 | Stop reason: HOSPADM

## 2017-07-18 RX ORDER — ACETAMINOPHEN 500 MG
TABLET ORAL
Status: COMPLETED
Start: 2017-07-18 | End: 2017-07-18

## 2017-07-18 RX ORDER — TRAMADOL HYDROCHLORIDE 50 MG/1
50 TABLET ORAL EVERY 4 HOURS PRN
Qty: 15 TAB | Refills: 0 | Status: SHIPPED | OUTPATIENT
Start: 2017-07-18 | End: 2018-11-05

## 2017-07-18 RX ORDER — SCOLOPAMINE TRANSDERMAL SYSTEM 1 MG/1
1 PATCH, EXTENDED RELEASE TRANSDERMAL
Status: DISCONTINUED | OUTPATIENT
Start: 2017-07-18 | End: 2017-07-18 | Stop reason: HOSPADM

## 2017-07-18 RX ORDER — HALOPERIDOL 5 MG/ML
1 INJECTION INTRAMUSCULAR EVERY 6 HOURS PRN
Status: DISCONTINUED | OUTPATIENT
Start: 2017-07-18 | End: 2017-07-18

## 2017-07-18 RX ORDER — CELECOXIB 200 MG/1
CAPSULE ORAL
Status: COMPLETED
Start: 2017-07-18 | End: 2017-07-18

## 2017-07-18 RX ORDER — LIDOCAINE AND PRILOCAINE 25; 25 MG/G; MG/G
1 CREAM TOPICAL
Status: DISCONTINUED | OUTPATIENT
Start: 2017-07-18 | End: 2017-07-18 | Stop reason: HOSPADM

## 2017-07-18 RX ORDER — MAGNESIUM HYDROXIDE 1200 MG/15ML
LIQUID ORAL
Status: DISCONTINUED | OUTPATIENT
Start: 2017-07-18 | End: 2017-07-18 | Stop reason: HOSPADM

## 2017-07-18 RX ORDER — DEXAMETHASONE SODIUM PHOSPHATE 4 MG/ML
4 INJECTION, SOLUTION INTRA-ARTICULAR; INTRALESIONAL; INTRAMUSCULAR; INTRAVENOUS; SOFT TISSUE
Status: DISCONTINUED | OUTPATIENT
Start: 2017-07-18 | End: 2017-07-18 | Stop reason: HOSPADM

## 2017-07-18 RX ORDER — POLYETHYLENE GLYCOL 3350 17 G/17G
17 POWDER, FOR SOLUTION ORAL
COMMUNITY

## 2017-07-18 RX ADMIN — CELECOXIB 200 MG: 200 CAPSULE ORAL at 07:45

## 2017-07-18 RX ADMIN — TRAMADOL HYDROCHLORIDE 50 MG: 50 TABLET, FILM COATED ORAL at 11:15

## 2017-07-18 RX ADMIN — SODIUM CHLORIDE, SODIUM LACTATE, POTASSIUM CHLORIDE, CALCIUM CHLORIDE 1000 ML: 600; 310; 30; 20 INJECTION, SOLUTION INTRAVENOUS at 07:00

## 2017-07-18 RX ADMIN — ACETAMINOPHEN 500 MG: 500 TABLET, FILM COATED ORAL at 07:45

## 2017-07-18 ASSESSMENT — PAIN SCALES - GENERAL
PAINLEVEL_OUTOF10: 3

## 2017-07-18 NOTE — IP AVS SNAPSHOT
" Home Care Instructions                                                                                                                Name:Gena Dykes  Medical Record Number:[id number]  CSN: [id number]    YOB: 1955   Age: 61 y.o.  Sex: female  HT:1.6 m (5' 3\") WT: 51.2 kg (112 lb 14 oz)          Admit Date: 7/18/2017     Discharge Date:   Today's Date: 7/18/2017  Attending Doctor:  Brock Colin M.D.                  Allergies:  Erythromycin; Haldol; and Prolixin              Follow-up Information     1. Follow up with Brock Colin M.D. In 1 week.    Specialties:  Surgery, Radiology    Why:  and prn    Contact information    [id number]  [address]  [place] [place] [id number] [phone]          Discharge Instructions         ACTIVITY: Rest and take it easy for the first 24 hours.  A responsible adult is recommended to remain with you during that time.  It is normal to feel sleepy.  We encourage you to not do anything that requires balance, judgment or coordination.    MILD FLU-LIKE SYMPTOMS ARE NORMAL. YOU MAY EXPERIENCE GENERALIZED MUSCLE ACHES, THROAT IRRITATION, HEADACHE AND/OR SOME NAUSEA.    FOR 24 HOURS DO NOT:  Drive, operate machinery or run household appliances.  Drink beer or alcoholic beverages.   Make important decisions or sign legal documents.    SPECIAL INSTRUCTIONS:   Follow all instructions given to you by your doctor.    DIET: To avoid nausea, slowly advance diet as tolerated, avoiding spicy or greasy foods for the first day.  Add more substantial food to your diet according to your physician's instructions.  INCREASE FLUIDS AND FIBER TO AVOID CONSTIPATION.    SURGICAL DRESSING/BATHING:   May shower    FOLLOW-UP APPOINTMENT:  A follow-up appointment should be arranged with your doctor in 1-2 weeks; call to schedule.    You should CALL YOUR PHYSICIAN if you develop:  Fever greater than 101 degrees F.  Pain not relieved by medication, or persistent nausea or " vomiting.  Excessive bleeding (blood soaking through dressing) or unexpected drainage from the wound.  Extreme redness or swelling around the incision site, drainage of pus or foul smelling drainage.  Inability to urinate or empty your bladder within 8 hours.  Problems with breathing or chest pain.    You should call [phone] if you develop problems with breathing or chest pain.  If you are unable to contact your doctor or surgical center, you should go to the nearest emergency room or urgent care center.  Physician's telephone #: [phone]    If any questions arise, call your doctor.  If your doctor is not available, please feel free to call the Surgical Center at [phone].  The Center is open Monday through Friday from 7AM to 7PM.  You can also call the [facility] HOTLINE open 24 hours/day, 7 days/week and speak to a nurse at [phone], or toll free at [phone].    A registered nurse may call you a few days after your surgery to see how you are doing after your procedure.    MEDICATIONS: Resume taking daily medication.  Take prescribed pain medication with food.  If no medication is prescribed, you may take non-aspirin pain medication if needed.  PAIN MEDICATION CAN BE VERY CONSTIPATING.  Take a stool softener or laxative such as senokot, pericolace, or milk of magnesia if needed.    Prescription given for Ultram.  Last pain medication given at 11:15 am.    If your physician has prescribed pain medication that includes Acetaminophen (Tylenol), do not take additional Acetaminophen (Tylenol) while taking the prescribed medication.    Depression / Suicide Risk    As you are discharged from this [facility] facility, it is important to learn how to keep safe from harming yourself.    Recognize the warning signs:  · Abrupt changes in personality, positive or negative- including increase in energy   · Giving away possessions  · Change in eating patterns- significant weight changes-  positive or  negative  · Change in sleeping patterns- unable to sleep or sleeping all the time   · Unwillingness or inability to communicate  · Depression  · Unusual sadness, discouragement and loneliness  · Talk of wanting to die  · Neglect of personal appearance   · Rebelliousness- reckless behavior  · Withdrawal from people/activities they love  · Confusion- inability to concentrate     If you or a loved one observes any of these behaviors or has concerns about self-harm, here's what you can do:  · Talk about it- your feelings and reasons for harming yourself  · Remove any means that you might use to hurt yourself (examples: pills, rope, extension cords, firearm)  · Get professional help from the community (Mental Health, Substance Abuse, psychological counseling)  · Do not be alone:Call your Safe Contact- someone whom you trust who will be there for you.  · Call your local CRISIS HOTLINE 267-0536 or 050-872-9432  · Call your local Children's Mobile Crisis Response Team [place] (268) 328-4303 or www.[employer]  · Call the toll free National Suicide Prevention Hotlines   · National Suicide Prevention Lifeline 163-537-QCQL (7753)  · National Hope Line Network 800-SUICIDE (839-1112)       Medication List      START taking these medications        Instructions    Morning Afternoon Evening Bedtime    tramadol 50 MG Tabs   Last time this was given:  50 mg on 7/18/2017 11:15 AM   Commonly known as:  ULTRAM        Take 1 Tab by mouth every four hours as needed.   Dose:  50 mg                          CONTINUE taking these medications        Instructions    Morning Afternoon Evening Bedtime    CALCIUM 600 + D PO        Take 1 Tab by mouth 2 Times a Day.   Dose:  1 Tab                        clozapine 100 MG Tabs   Commonly known as:  CLOZARIL        Take 100 mg by mouth 2 times a day. 200MG in Am and 300MG HS  Indications: two tabs every morning and 4 tabs every bedtime   Dose:  100 mg                        docusate  sodium 100 MG Caps   Commonly known as:  COLACE        Take 240 mg by mouth 2 times a day.   Dose:  240 mg                        ketoconazole 2 % Crea   Commonly known as:  NIZORAL        APPLY TOPICALLY TO AFFECTED AREA(S) ONCE DAILY                        polyethylene glycol/lytes Pack   Commonly known as:  MIRALAX        Take 17 g by mouth every bedtime.   Dose:  17 g                        SAPHRIS 10 MG Subl   Generic drug:  Asenapine Maleate        Place 10 mg under tongue 2 times a day.   Dose:  10 mg                        therapeutic multivitamin-minerals Tabs        Take 1 Tab by mouth every day.   Dose:  1 Tab                             Where to Get Your Medications      You can get these medications from any pharmacy     Bring a paper prescription for each of these medications    - tramadol 50 MG Tabs            Medication Information     Above and/or attached are the medications your physician expects you to take upon discharge. Review all of your home medications and newly ordered medications with your doctor and/or pharmacist. Follow medication instructions as directed by your doctor and/or pharmacist. Please keep your medication list with you and share with your physician. Update the information when medications are discontinued, doses are changed, or new medications (including over-the-counter products) are added; and carry medication information at all times in the event of emergency situations.        Resources     Quit Smoking / Tobacco Use:    I understand the use of any tobacco products increases my chance of suffering from future heart disease or stroke and could cause other illnesses which may shorten my life. Quitting the use of tobacco products is the single most important thing I can do to improve my health. For further information on smoking / tobacco cessation call a Toll Free Quit Line at 1-413.654.4307 (*National Cancer [place]) or 1-305.356.3603 (American Lung Association) or you  can access the web based program at www.lungAnystream.org.    [place] Tobacco Users Help Line:  (648) 154-7647       Toll Free: 1-197.161.6969  Quit Tobacco Program [facility] (877)735-1073    Crisis Hotline:    [place] Crisis Hotline:  1-364-YZBXPJP or 1-747.229.1736    [place] Crisis Hotline:    1-228.263.3191 or 700-324-4488    Discharge Survey:   Thank you for choosing [facility]. We hope we did everything we could to make your hospital stay a pleasant one. You may be receiving a survey and we would appreciate your time and participation in answering the questions. Your input is very valuable to us in our efforts to improve our service to our patients and their families.            Signatures     My signature on this form indicates that:    1. I acknowledge receipt and understanding of these Home Care Instruction.  2. My questions regarding this information have been answered to my satisfaction.  3. I have formulated a plan with my discharge nurse to obtain my prescribed medications for home.    __________________________________      __________________________________                   Patient Signature                                 Guardian/Responsible Adult Signature      __________________________________                 __________       ________                       Nurse Signature                                               Date                 Time

## 2017-07-18 NOTE — OP REPORT
Operative Report    Date: 7/18/2017    Surgeon: Brock Colin M.D.    Assistant: April Wise    Anesthesiologist: Iva Dasilva    Pre-operative Diagnosis:    Schizophrenia  Anal mass  Bilateral groin lymphadenopathy    Post-operative Diagnosis: same    Procedure: excision of inguinal lymphadenopathy bilaterally    Findings: Multiple fusiform mobile 1 cm groin lymph nodes bilaterally. 2 of the largest from each side were excised and sent for permanent pathology to rule out sue metastasis from presumed     Procedure in detail: The patient was identified in the pre-operative holding area and brought to the operating room. Correct side and site were identified. Pre-operative antibiotics of ancef were administered prior to the procedure. Anesthesia was smoothly induced.The patient was then prepped and draped in the usual sterile fashion.    The skin was infiltrated with local anesthetic and a curvilinear incision was made along the lines of Langerhans in the right groin.  The subcutaneous tissue was grasped and the largest lymph node excised using cautery. The specimen was then completely removed and was sent for permanent pathology. Meticulous hemostasis was achieved with electrocautery. This was repeated for the next largest palpable lymph node in the right groin. This again was repeated on the left groin. The area was irrigated and suctioned. The skin was closed in two layers with vicryl and monocryl. Sterile dressings were applied.     The patient was awakened from anesthetic, and was taken to the recovery room in stable condition.    Sponge and needle counts were correct at the end of the case.     Specimen:   Right groin lymph node one and two  Left groin lymph node one and two    EBL: 10mL    Dispo: stable, extubated, to PACU    Brock Colin MD PhD  [facility]  Colon and Rectal Surgeon  [phone]

## 2017-07-18 NOTE — IP AVS SNAPSHOT
7/18/2017    Gena Dykes  [address] [facility] [address]    Dear Gena:    [facility] wants to ensure your discharge home is safe and you or your loved ones have had all of your questions answered regarding your care after you leave the hospital.    Below is a list of resources and contact information should you have any questions regarding your hospital stay, follow-up instructions, or active medical symptoms.    Questions or Concerns Regarding… Contact   Medical Questions Related to Your Discharge  (7 days a week, 8am-5pm) Contact a Nurse Care Coordinator   [phone]   Medical Questions Not Related to Your Discharge  (24 hours a day / 7 days a week)  Contact the Nurse Health Line   [phone]    Medications or Discharge Instructions Refer to your discharge packet   or contact your [facility] Primary Care Provider   [phone]   Follow-up Appointment(s) Schedule your appointment via [employer]   or contact Scheduling [phone]   Billing Review your statement via [employer]  or contact Billing [phone]   Medical Records Review your records via [employer]   or contact Medical Records [phone]     You may receive a telephone call within two days of discharge. This call is to make certain you understand your discharge instructions and have the opportunity to have any questions answered. You can also easily access your medical information, test results and upcoming appointments via the [employer] free online health management tool. You can learn more and sign up at [email or web address]. For assistance setting up your [employer] account, please call [phone].    Once again, we want to ensure your discharge home is safe and that you have a clear understanding of any next steps in your care. If you have any questions or concerns, please do not hesitate to contact us, we are here for you. Thank you for choosing [facility] for your healthcare needs.    Sincerely,    Your [facility] Healthcare Team

## 2017-07-18 NOTE — OR SURGEON
Operative Report    PreOp Diagnosis:   Schizophrenia  Anal mass  Bilateral groin lymphadenopathy    PostOp Diagnosis: Same    Procedure(s):  MASS EXCISION GENERAL FOR INGUINAL LYMPH NODE EXCISION - Wound Class: Clean    Surgeon(s):  Brock Colin M.D.    Anesthesiologist/Type of Anesthesia:  Anesthesiologist: Iva Dasilva M.D./General    Surgical Staff:  Assistant: April Wise  Circulator: Lindsey Hendrix R.N.  Relief Circulator: Nguyen Lovett R.N.  Relief Scrub: Gwen Machado  Scrub Person: Sulema Lynch    Specimens:  Right groin lymph node one and two  Left groin lymph node one and two    Estimated Blood Loss: 10 mL    Findings: Multiple fusiform mobile 1 cm groin lymph nodes bilaterally. 2 of the largest from each side were excised and sent for permanent pathology to rule out sue metastasis from presumed anal squamous cell cancer  Complications: None        7/18/2017 10:10 AM Brock Colin

## 2017-07-18 NOTE — DISCHARGE INSTRUCTIONS
ACTIVITY: Rest and take it easy for the first 24 hours.  A responsible adult is recommended to remain with you during that time.  It is normal to feel sleepy.  We encourage you to not do anything that requires balance, judgment or coordination.    MILD FLU-LIKE SYMPTOMS ARE NORMAL. YOU MAY EXPERIENCE GENERALIZED MUSCLE ACHES, THROAT IRRITATION, HEADACHE AND/OR SOME NAUSEA.    FOR 24 HOURS DO NOT:  Drive, operate machinery or run household appliances.  Drink beer or alcoholic beverages.   Make important decisions or sign legal documents.    SPECIAL INSTRUCTIONS:   Follow all instructions given to you by your doctor.    DIET: To avoid nausea, slowly advance diet as tolerated, avoiding spicy or greasy foods for the first day.  Add more substantial food to your diet according to your physician's instructions.  INCREASE FLUIDS AND FIBER TO AVOID CONSTIPATION.    SURGICAL DRESSING/BATHING:   May shower    FOLLOW-UP APPOINTMENT:  A follow-up appointment should be arranged with your doctor in 1-2 weeks; call to schedule.    You should CALL YOUR PHYSICIAN if you develop:  Fever greater than 101 degrees F.  Pain not relieved by medication, or persistent nausea or vomiting.  Excessive bleeding (blood soaking through dressing) or unexpected drainage from the wound.  Extreme redness or swelling around the incision site, drainage of pus or foul smelling drainage.  Inability to urinate or empty your bladder within 8 hours.  Problems with breathing or chest pain.    You should call 911 if you develop problems with breathing or chest pain.  If you are unable to contact your doctor or surgical center, you should go to the nearest emergency room or urgent care center.  Physician's telephone #: [phone]    If any questions arise, call your doctor.  If your doctor is not available, please feel free to call the Surgical Center at [phone].  The Center is open Monday through Friday from 7AM to 7PM.  You can also call the HEALTH  HOTLINE open 24 hours/day, 7 days/week and speak to a nurse at [phone], or toll free at [phone].    A registered nurse may call you a few days after your surgery to see how you are doing after your procedure.    MEDICATIONS: Resume taking daily medication.  Take prescribed pain medication with food.  If no medication is prescribed, you may take non-aspirin pain medication if needed.  PAIN MEDICATION CAN BE VERY CONSTIPATING.  Take a stool softener or laxative such as senokot, pericolace, or milk of magnesia if needed.    Prescription given for Ultram.  Last pain medication given at 11:15 am.    If your physician has prescribed pain medication that includes Acetaminophen (Tylenol), do not take additional Acetaminophen (Tylenol) while taking the prescribed medication.    Depression / Suicide Risk    As you are discharged from this [facility] facility, it is important to learn how to keep safe from harming yourself.    Recognize the warning signs:  · Abrupt changes in personality, positive or negative- including increase in energy   · Giving away possessions  · Change in eating patterns- significant weight changes-  positive or negative  · Change in sleeping patterns- unable to sleep or sleeping all the time   · Unwillingness or inability to communicate  · Depression  · Unusual sadness, discouragement and loneliness  · Talk of wanting to die  · Neglect of personal appearance   · Rebelliousness- reckless behavior  · Withdrawal from people/activities they love  · Confusion- inability to concentrate     If you or a loved one observes any of these behaviors or has concerns about self-harm, here's what you can do:  · Talk about it- your feelings and reasons for harming yourself  · Remove any means that you might use to hurt yourself (examples: pills, rope, extension cords, firearm)  · Get professional help from the community (Mental Health, Substance Abuse, psychological counseling)  · Do not be alone:Call  your Safe Contact- someone whom you trust who will be there for you.  · Call your local CRISIS HOTLINE 759-6747 or 355-666-2381  · Call your local Children's Mobile Crisis Response Team [place] (692) 838-0747 or www.[employer]  · Call the toll free National Suicide Prevention Hotlines   · National Suicide Prevention Lifeline 744-401-XXMP (6392)  · National [employer] Line Network 800-SUICIDE (047-1870)

## 2017-07-18 NOTE — OR NURSING
Pt doing very well. States she has very mild pain and feels great. Talked with Dr Dasilva about B/P being elevated and she said it was fine and felt it appropriate for pt to be discharged. Pt also would like to go home at this time.

## 2017-08-17 ENCOUNTER — HOSPITAL ENCOUNTER (OUTPATIENT)
Dept: HOSPITAL 8 - OUT | Age: 62
End: 2017-08-17
Attending: SURGERY
Payer: MEDICARE

## 2017-08-17 VITALS — SYSTOLIC BLOOD PRESSURE: 133 MMHG | DIASTOLIC BLOOD PRESSURE: 85 MMHG

## 2017-08-17 VITALS — BODY MASS INDEX: 21.02 KG/M2 | HEIGHT: 63 IN | WEIGHT: 118.61 LBS

## 2017-08-17 DIAGNOSIS — F20.9: ICD-10-CM

## 2017-08-17 DIAGNOSIS — C21.0: Primary | ICD-10-CM

## 2017-08-17 DIAGNOSIS — Z98.890: ICD-10-CM

## 2017-08-17 PROCEDURE — 88331 PATH CONSLTJ SURG 1 BLK 1SPC: CPT

## 2017-08-17 PROCEDURE — 93005 ELECTROCARDIOGRAM TRACING: CPT

## 2017-08-17 PROCEDURE — C9290 INJ, BUPIVACAINE LIPOSOME: HCPCS

## 2017-08-17 PROCEDURE — 88305 TISSUE EXAM BY PATHOLOGIST: CPT

## 2017-08-17 PROCEDURE — 45172 EXC RECT TUM TRANSANAL FULL: CPT

## 2017-09-19 ENCOUNTER — HOSPITAL ENCOUNTER (OUTPATIENT)
Dept: HOSPITAL 8 - ROC | Age: 62
Discharge: HOME | End: 2017-09-19
Attending: RADIOLOGY
Payer: MEDICARE

## 2017-09-19 DIAGNOSIS — F20.0: ICD-10-CM

## 2017-09-19 DIAGNOSIS — C21.1: Primary | ICD-10-CM

## 2017-09-19 DIAGNOSIS — Z87.891: ICD-10-CM

## 2017-09-19 DIAGNOSIS — Z92.3: ICD-10-CM

## 2017-09-19 PROCEDURE — G0463 HOSPITAL OUTPT CLINIC VISIT: HCPCS

## 2017-09-21 DIAGNOSIS — C21.0 ANAL SQUAMOUS CELL CARCINOMA (HCC): ICD-10-CM

## 2017-09-21 RX ORDER — ONDANSETRON 2 MG/ML
4 INJECTION INTRAMUSCULAR; INTRAVENOUS
Status: CANCELLED | OUTPATIENT
Start: 2017-10-02

## 2017-09-21 RX ORDER — SODIUM CHLORIDE 9 MG/ML
INJECTION, SOLUTION INTRAVENOUS CONTINUOUS
Status: CANCELLED | OUTPATIENT
Start: 2017-11-01

## 2017-09-21 RX ORDER — SODIUM CHLORIDE 9 MG/ML
INJECTION, SOLUTION INTRAVENOUS CONTINUOUS
Status: CANCELLED | OUTPATIENT
Start: 2017-10-02

## 2017-09-21 RX ORDER — ONDANSETRON 2 MG/ML
4 INJECTION INTRAMUSCULAR; INTRAVENOUS
Status: CANCELLED | OUTPATIENT
Start: 2017-11-01

## 2017-09-21 RX ORDER — PROCHLORPERAZINE MALEATE 10 MG
10 TABLET ORAL EVERY 6 HOURS PRN
Status: CANCELLED | OUTPATIENT
Start: 2017-10-02

## 2017-09-21 RX ORDER — LIDOCAINE HYDROCHLORIDE 10 MG/ML
0.5 INJECTION, SOLUTION INFILTRATION; PERINEURAL SEE ADMIN INSTRUCTIONS
Status: CANCELLED | OUTPATIENT
Start: 2017-11-01

## 2017-09-21 RX ORDER — ONDANSETRON 8 MG/1
8 TABLET, ORALLY DISINTEGRATING ORAL
Status: CANCELLED | OUTPATIENT
Start: 2017-10-02

## 2017-09-21 RX ORDER — ONDANSETRON 8 MG/1
8 TABLET, ORALLY DISINTEGRATING ORAL
Status: CANCELLED | OUTPATIENT
Start: 2017-11-01

## 2017-09-21 RX ORDER — PROCHLORPERAZINE MALEATE 10 MG
10 TABLET ORAL EVERY 6 HOURS PRN
Status: CANCELLED | OUTPATIENT
Start: 2017-11-01

## 2017-09-21 RX ORDER — LIDOCAINE HYDROCHLORIDE 10 MG/ML
0.5 INJECTION, SOLUTION INFILTRATION; PERINEURAL SEE ADMIN INSTRUCTIONS
Status: CANCELLED | OUTPATIENT
Start: 2017-10-02

## 2017-09-25 ENCOUNTER — APPOINTMENT (OUTPATIENT)
Dept: RADIATION ONCOLOGY | Facility: MEDICAL CENTER | Age: 62
End: 2017-09-25
Attending: RADIOLOGY
Payer: MEDICARE

## 2017-09-29 ENCOUNTER — HOSPITAL ENCOUNTER (OUTPATIENT)
Dept: RADIOLOGY | Facility: MEDICAL CENTER | Age: 62
End: 2017-09-29
Attending: SPECIALIST
Payer: MEDICARE

## 2017-09-29 DIAGNOSIS — C21.1 MALIGNANT NEOPLASM OF ANAL CANAL (HCC): ICD-10-CM

## 2017-09-29 PROCEDURE — C1751 CATH, INF, PER/CENT/MIDLINE: HCPCS

## 2017-09-29 NOTE — PROGRESS NOTES
PICC Insertion Final 3CG    Consents confirmed, vessel patency confirmed with ultrasound. Risks and benefits of procedure explained to patient/family and education regarding central line associated bloodstream infections provided. Questions answered.     PICC placed in RUE per MD order with ultrasound guidance. 5 Fr, DOUBLE lumen PICC placed in BASILIC vein after ONE attempt(s). 1 cc's of 1% lidocaine injected intradermally, 21 gauge microintroducer needle and modified Seldinger technique used. 35 cm catheter inserted with good blood return. Secured at 1cm marker. Each lumen flushed without resistance with 10 mL 0.9% normal saline. PICC line secured with Biopatch and Tegaderm.    PICC placement is confirmed by nurse using 3CG technology. Appropriate waveform printed and placed on chart. PICC line is appropriate for use at this time. Pt tolerated procedure WELL.  Patient condition relayed to unit RN or ordering physician via this post procedure note in the EMR. RUE arm circumference 9 cm proximal to ac:  27 cm.    Ever[employer] Power PICC ref # [id number], Lot # [id number]    All guidewires removed.  Discharge instructions reviewed with patient and family, handout given.

## 2017-10-02 ENCOUNTER — OFFICE VISIT (OUTPATIENT)
Dept: INTERNAL MEDICINE | Facility: MEDICAL CENTER | Age: 62
End: 2017-10-02
Payer: MEDICARE

## 2017-10-02 ENCOUNTER — OUTPATIENT INFUSION SERVICES (OUTPATIENT)
Dept: ONCOLOGY | Facility: MEDICAL CENTER | Age: 62
End: 2017-10-02
Attending: SPECIALIST
Payer: MEDICARE

## 2017-10-02 VITALS
TEMPERATURE: 97 F | WEIGHT: 126.98 LBS | HEART RATE: 83 BPM | BODY MASS INDEX: 23.98 KG/M2 | RESPIRATION RATE: 18 BRPM | OXYGEN SATURATION: 98 % | DIASTOLIC BLOOD PRESSURE: 60 MMHG | SYSTOLIC BLOOD PRESSURE: 125 MMHG | HEIGHT: 61 IN

## 2017-10-02 VITALS
OXYGEN SATURATION: 98 % | SYSTOLIC BLOOD PRESSURE: 128 MMHG | BODY MASS INDEX: 23.56 KG/M2 | TEMPERATURE: 97.8 F | DIASTOLIC BLOOD PRESSURE: 84 MMHG | HEIGHT: 61 IN | WEIGHT: 124.8 LBS | RESPIRATION RATE: 18 BRPM | HEART RATE: 95 BPM

## 2017-10-02 DIAGNOSIS — R21 RASH, SKIN: ICD-10-CM

## 2017-10-02 DIAGNOSIS — F20.0 PARANOID SCHIZOPHRENIA (HCC): ICD-10-CM

## 2017-10-02 DIAGNOSIS — D05.92 CARCINOMA IN SITU OF LEFT BREAST, UNSPECIFIED TYPE: ICD-10-CM

## 2017-10-02 DIAGNOSIS — C21.0 ANAL SQUAMOUS CELL CARCINOMA (HCC): ICD-10-CM

## 2017-10-02 DIAGNOSIS — E78.00 HYPERCHOLESTEROLEMIA: ICD-10-CM

## 2017-10-02 LAB
ALBUMIN SERPL BCP-MCNC: 3.4 G/DL (ref 3.2–4.9)
ALBUMIN/GLOB SERPL: 1 G/DL
ALP SERPL-CCNC: 89 U/L (ref 30–99)
ALT SERPL-CCNC: 5 U/L (ref 2–50)
ANION GAP SERPL CALC-SCNC: 5 MMOL/L (ref 0–11.9)
AST SERPL-CCNC: 16 U/L (ref 12–45)
BASOPHILS # BLD AUTO: 0.6 % (ref 0–1.8)
BASOPHILS # BLD: 0.05 K/UL (ref 0–0.12)
BILIRUB SERPL-MCNC: 0.3 MG/DL (ref 0.1–1.5)
BUN SERPL-MCNC: 17 MG/DL (ref 8–22)
CALCIUM SERPL-MCNC: 9.1 MG/DL (ref 8.5–10.5)
CHLORIDE SERPL-SCNC: 104 MMOL/L (ref 96–112)
CO2 SERPL-SCNC: 23 MMOL/L (ref 20–33)
CREAT SERPL-MCNC: 0.7 MG/DL (ref 0.5–1.4)
EOSINOPHIL # BLD AUTO: 0.42 K/UL (ref 0–0.51)
EOSINOPHIL NFR BLD: 4.6 % (ref 0–6.9)
ERYTHROCYTE [DISTWIDTH] IN BLOOD BY AUTOMATED COUNT: 51.3 FL (ref 35.9–50)
GFR SERPL CREATININE-BSD FRML MDRD: >60 ML/MIN/1.73 M 2
GLOBULIN SER CALC-MCNC: 3.3 G/DL (ref 1.9–3.5)
GLUCOSE SERPL-MCNC: 73 MG/DL (ref 65–99)
HCT VFR BLD AUTO: 37.5 % (ref 37–47)
HGB BLD-MCNC: 12.1 G/DL (ref 12–16)
IMM GRANULOCYTES # BLD AUTO: 0.04 K/UL (ref 0–0.11)
IMM GRANULOCYTES NFR BLD AUTO: 0.4 % (ref 0–0.9)
LYMPHOCYTES # BLD AUTO: 1.26 K/UL (ref 1–4.8)
LYMPHOCYTES NFR BLD: 13.9 % (ref 22–41)
MCH RBC QN AUTO: 30.4 PG (ref 27–33)
MCHC RBC AUTO-ENTMCNC: 32.3 G/DL (ref 33.6–35)
MCV RBC AUTO: 94.2 FL (ref 81.4–97.8)
MONOCYTES # BLD AUTO: 0.62 K/UL (ref 0–0.85)
MONOCYTES NFR BLD AUTO: 6.8 % (ref 0–13.4)
NEUTROPHILS # BLD AUTO: 6.69 K/UL (ref 2–7.15)
NEUTROPHILS NFR BLD: 73.7 % (ref 44–72)
NRBC # BLD AUTO: 0 K/UL
NRBC BLD AUTO-RTO: 0 /100 WBC
PLATELET # BLD AUTO: 169 K/UL (ref 164–446)
PMV BLD AUTO: 9.8 FL (ref 9–12.9)
POTASSIUM SERPL-SCNC: 6.3 MMOL/L (ref 3.6–5.5)
PROT SERPL-MCNC: 6.7 G/DL (ref 6–8.2)
RBC # BLD AUTO: 3.98 M/UL (ref 4.2–5.4)
SODIUM SERPL-SCNC: 132 MMOL/L (ref 135–145)
WBC # BLD AUTO: 9.1 K/UL (ref 4.8–10.8)

## 2017-10-02 PROCEDURE — 80053 COMPREHEN METABOLIC PANEL: CPT

## 2017-10-02 PROCEDURE — 36592 COLLECT BLOOD FROM PICC: CPT

## 2017-10-02 PROCEDURE — 306780 HCHG STAT FOR TRANSFUSION PER CASE

## 2017-10-02 PROCEDURE — 85025 COMPLETE CBC W/AUTO DIFF WBC: CPT

## 2017-10-02 PROCEDURE — 99214 OFFICE O/P EST MOD 30 MIN: CPT | Mod: GC | Performed by: INTERNAL MEDICINE

## 2017-10-02 ASSESSMENT — PAIN SCALES - GENERAL
PAINLEVEL: NO PAIN
PAINLEVEL: NO PAIN

## 2017-10-02 ASSESSMENT — ENCOUNTER SYMPTOMS
SHORTNESS OF BREATH: 0
HEADACHES: 0
CHILLS: 0
ABDOMINAL PAIN: 0
EYES NEGATIVE: 1
DIARRHEA: 0
FEVER: 0
COUGH: 0
SENSORY CHANGE: 0
MUSCULOSKELETAL NEGATIVE: 1
CONSTIPATION: 0
SORE THROAT: 0

## 2017-10-02 ASSESSMENT — PATIENT HEALTH QUESTIONNAIRE - PHQ9: CLINICAL INTERPRETATION OF PHQ2 SCORE: 0

## 2017-10-02 NOTE — PROGRESS NOTES
"Pharmacy Chemotherapy Calculations    Dx: Anal Cancer  Cycle: 1, Day 1 (Previous treatment = NA)  Regimen and Dosing Reference   Fluorouracil 1000 mg/m2 CIVI over 24 hours on Days 1-4 and 29-32 of radiation- dispensed as 4000 mg/m2 over 96 hours  Mitomycin 10 mg/m2 IV Days 1 and 29  35 day cycle for 1 cycle    NCCN guidelines for anal carcinoma V.1.2017  Xiomara M, et al. J Clin Oncol. 1996;14(9):2527-39    /48   Pulse (!) 105   Temp 36.6 °C (97.8 °F)   Resp 18   Ht 1.56 m (5' 1.42\")   Wt 56.3 kg (124 lb 1.9 oz)   LMP 07/03/2000 (Approximate)   SpO2 97%   BMI 23.13 kg/m²  Body surface area is 1.56 meters squared.     10/2/17 ANC ~ 6690     Plt = 169 k Hgb = 12.1     Scr =  0.70      Crcl ~76 ml/min      LFT/TBili = WNL/0.3  Note: K was reported at 6.3, repeated test = 4.4    Mitomycin 10 mg/m2 x 1.56 m2 = 15.6 mg   <5% difference, OK to treat with final dose = 15.6 mg IV    Fluorouracil 4000 mg/m2 x 1.56 m2 = 6240 mg   <5% difference, OK to treat with final dose = 6240 mg IV   Via home infusion pump at 1.3 ml/hr for 96 hours    Manuel Chang, PharmD    "

## 2017-10-02 NOTE — PROGRESS NOTES
Established Patient    Gena presents today with the following:    CC: Follow up    HPI:    Anal cancer- Found to have anal mass on routine pap smear.  Biopsy of the mass showed squamous cell carcinoma.   B/L inguinal LN biopsy negative for malignancy.   She was evaluated by radiation oncologist and oncologist.   She underwent staging CT abdomen/pelvis/chest and it was noted that there is no metastasis in her oncologist note.    CT abdomen/pelvis/chest was done outside facility and no result available.  She will be starting chemotherapy as well as radiation therapy tomorrow.   Denies rectal pain or abdominal pain.  Reports she is moving her bowel.      Carcinoma in situ of breast- Completed therapy under care of Dr. Cervantes.   Mammogram 2017: unremarkable.       Fungal dermatitis- Resolved    DLD- ASCVD risk 2%.  Not a statin benefit group.  Life style modifications such as low fat diet.     Paranoid Schizophrenia-  Followed by Psychiatrist.  On clozapine.               Patient Active Problem List    Diagnosis Date Noted   • Anal squamous cell carcinoma (CMS-HCC) 09/21/2017   • Bilateral hilar adenopathy syndrome 07/18/2017   • Rash, skin 06/17/2016   • Paranoid schizophrenia (CMS-HCC) 04/29/2016   • Carcinoma in situ of breast 04/29/2016   • Hypercholesterolemia 04/29/2016   • Loss of weight 04/29/2016       Current Outpatient Prescriptions   Medication Sig Dispense Refill   • polyethylene glycol/lytes (MIRALAX) Pack Take 17 g by mouth every bedtime.     • tramadol (ULTRAM) 50 MG Tab Take 1 Tab by mouth every four hours as needed. 15 Tab 0   • ketoconazole (NIZORAL) 2 % Cream APPLY TOPICALLY TO AFFECTED AREA(S) ONCE DAILY 30 g 0   • Calcium Carb-Cholecalciferol (CALCIUM 600 + D PO) Take 1 Tab by mouth 2 Times a Day.     • Asenapine Maleate (SAPHRIS) 10 MG SL Tab Place 10 mg under tongue 2 times a day.     • clozapine (CLOZARIL) 100 MG TABS Take 100 mg by mouth 2 times a day. 200MG in Am and 300MG HS   Indications: two tabs every morning and 4 tabs every bedtime     • docusate sodium (COLACE) 100 MG CAPS Take 240 mg by mouth 2 times a day.     • therapeutic multivitamin-minerals (THERAGRAN-M) TABS Take 1 Tab by mouth every day.       No current facility-administered medications for this visit.        ROS: As per HPI. Additional pertinent symptoms as noted below.      Review of Systems   Constitutional: Negative for chills and fever.   HENT: Negative for sore throat.    Eyes: Negative.    Respiratory: Negative for cough and shortness of breath.    Cardiovascular: Negative for chest pain.   Gastrointestinal: Negative for abdominal pain, constipation and diarrhea.   Genitourinary: Negative.  Negative for dysuria.   Musculoskeletal: Negative.    Skin: Negative for itching and rash.   Neurological: Negative for sensory change and headaches.       LMP 07/03/2000 (Approximate)     Physical Exam   Constitutional:  oriented to person, place, and time. No distress.   Eyes: Pupils are equal, round, and reactive to light. No scleral icterus.  Neck: Neck supple.  Left cervical lymphadenopathy  Cardiovascular: Normal rate, regular rhythm and normal heart sounds.  Exam reveals no gallop and no friction rub.  No murmur heard.  Pulmonary/Chest: Breath sounds normal. Chest wall is not dull to percussion.   Musculoskeletal:   no edema.   Abdomen:  Soft, BS+, Non tender, non distended  Lymphadenopathy: Left cervical adenopathy -hard, mobile and about 5 mm in size.   Neurological: alert and oriented to person, place, and time.   Skin: No cyanosis. Nails show no clubbing.        Assessment and Plan    1. Anal squamous cell carcinoma (CMS-HCC)  - Starting chemotherapy and radiation therapy tomorrow  - Patient requesting Influenza vaccine  - However, live influenza vaccine should be given >4 weeks prior to chemotherapy  and inactivated vaccine should be given >2 weeks prior to chemotherapy.    - Since she is starting chemotherapy, will  hold off Influenza vaccination  - Patient was advised about good hand hygiene to decrease risk of Influenza.     2. Carcinoma in situ of left breast, unspecified type  - Completed therapy with Dr. Cervantes  - Repeat mammogram 2017 unremarkable.     3. Hypercholesterolemia  - Low ASCVD risk.  Life style modifications with low fat diet    4. Paranoid schizophrenia (CMS-HCC)  - On clozapine.   - Defer management to psychiatrist.     5. Rash, skin  - Resolved.   - Stop Ketoconazole       Followup: No Follow-up on file.      Signed by: Mitch Honeycutt M.D.

## 2017-10-02 NOTE — PROGRESS NOTES
Pt to infusion services for chemotherapy education.  Pt to receive Mitomycin and 5 FU tomorrow as treatment for anal cancer.  Pt oriented to infusion services and plan of care for the day.  Chemotherapy education folder and drug information sheets given.  Power point presentation given.  PICC line in place, brisk blood return noted.  Labs drawn as ordered.  Pt given tour of infusion services, all questions answered.  Pt left infusion center ambulatory and in good condition in care of family and caregiver.

## 2017-10-03 ENCOUNTER — PATIENT OUTREACH (OUTPATIENT)
Dept: OTHER | Facility: MEDICAL CENTER | Age: 62
End: 2017-10-03

## 2017-10-03 ENCOUNTER — APPOINTMENT (OUTPATIENT)
Dept: ONCOLOGY | Facility: MEDICAL CENTER | Age: 62
End: 2017-10-03
Attending: SPECIALIST
Payer: MEDICARE

## 2017-10-03 VITALS
BODY MASS INDEX: 23.43 KG/M2 | TEMPERATURE: 97.8 F | RESPIRATION RATE: 18 BRPM | OXYGEN SATURATION: 97 % | HEIGHT: 61 IN | SYSTOLIC BLOOD PRESSURE: 115 MMHG | WEIGHT: 124.12 LBS | DIASTOLIC BLOOD PRESSURE: 48 MMHG | HEART RATE: 105 BPM

## 2017-10-03 DIAGNOSIS — C21.0 ANAL SQUAMOUS CELL CARCINOMA (HCC): ICD-10-CM

## 2017-10-03 LAB — POTASSIUM BLD-SCNC: 4.4 MMOL/L (ref 3.6–5.5)

## 2017-10-03 PROCEDURE — 84132 ASSAY OF SERUM POTASSIUM: CPT

## 2017-10-03 PROCEDURE — 700105 HCHG RX REV CODE 258

## 2017-10-03 PROCEDURE — 700111 HCHG RX REV CODE 636 W/ 250 OVERRIDE (IP)

## 2017-10-03 PROCEDURE — 96375 TX/PRO/DX INJ NEW DRUG ADDON: CPT

## 2017-10-03 PROCEDURE — 96413 CHEMO IV INFUSION 1 HR: CPT

## 2017-10-03 PROCEDURE — 700111 HCHG RX REV CODE 636 W/ 250 OVERRIDE (IP): Performed by: SPECIALIST

## 2017-10-03 PROCEDURE — G0498 CHEMO EXTEND IV INFUS W/PUMP: HCPCS

## 2017-10-03 PROCEDURE — 700105 HCHG RX REV CODE 258: Performed by: SPECIALIST

## 2017-10-03 RX ADMIN — SODIUM CHLORIDE 15.6 MG: 9 INJECTION, SOLUTION INTRAVENOUS at 11:42

## 2017-10-03 RX ADMIN — DEXAMETHASONE SODIUM PHOSPHATE 12 MG: 4 INJECTION, SOLUTION INTRAMUSCULAR; INTRAVENOUS at 10:44

## 2017-10-03 RX ADMIN — FLUOROURACIL 6240 MG: 50 INJECTION, SOLUTION INTRAVENOUS at 12:50

## 2017-10-03 RX ADMIN — ONDANSETRON HYDROCHLORIDE 16 MG: 2 SOLUTION INTRAMUSCULAR; INTRAVENOUS at 11:02

## 2017-10-03 ASSESSMENT — PAIN SCALES - GENERAL: PAINLEVEL: NO PAIN

## 2017-10-03 NOTE — PROGRESS NOTES
On 10/3/2017 at 1142 this ONN met with patient, patient's care giver Radha Love, patient's brother Greg Rosenthal, and patient's sister. Patient gave permission for medical providers to speak with patient's brother and patient's caregiver if needed. Patient denies concerns about treatment. Patient rates stress at 0/10. Patient's family and caregiver deny questions or concerns about treatment. Family confirms that patient is going to start receiving radiation at [facility]. Explained that [facility] also has a nurse navigator in case patient, patient family, or caregiver had questions regarding treatment at [facility]. Explained that family or caregiver could call this ONN with questions or concerns. Provided this ONN's business card.

## 2017-10-03 NOTE — PROGRESS NOTES
Patient presents ambulatory with care giver and family for C1D1 of chemotherapy.  Patient reports feeling well and denies any s/s of  Infection at this time.  Labs from 10/2/17 reviewed, K was 6.3, potassium rechecked today and is WNL.  PICC line dressing changed using sterile technique, flushed per protocol, blood return noted, and lab collected per MD order.  Tressa RN navigator at chair side to talk with patient and family.  Pre medication given per MD order with no adverse reaction.  Mitomycin infused per MD order with no complications or adverse reactions.  Home infusion pump video viewed by patient and care giver, consent signed, and home spill kit provided.  5 FU pump connected and functions reviewed with patient and care giver.  Patient to return 10/7/17 for pump disconnect, confirmed with patient and care giver.  Patient left ambulatory with care giver in no distress.

## 2017-10-03 NOTE — PROGRESS NOTES
"Pharmacy Chemotherapy Note    Second Check    Patient Name: ANDRES BANUELOS  MRN: [id number]    Oncologist: Billy    Protocol: OP ANAL FLUOROURACIL  CI + MITOMYCIN + XRT       Fluorouracil 1000 mg/m2 CIV over 24 hours on day 1-4 and 29-32 of radiation  Mitomycin 10 mg/m2 IV push on day 1 and 29   35 day cycle for 1 cycle    *Dosing Reference*  NCCN guidelines for anal carcinoma V.1.2017  Xiomara Lucas al. J Clin. Oncol. 1996:14(9):2527  Savage ARMIJO, et al. DAVE. 2008: 299(16): 6564-21    Dx: Anal Cancer         /48   Pulse (!) 105   Temp 36.6 °C (97.8 °F)   Resp 18   Ht 1.56 m (5' 1.42\")   Wt 56.3 kg (124 lb 1.9 oz)   LMP 07/03/2000 (Approximate)   SpO2 97%   BMI 23.13 kg/m²  Body surface area is 1.56 meters squared.       10/2/2017:  ANC: 6690      WBC: 9.1     Plt: 169k   Hgb/Hct: 12.1/37.5     SCr: 0.7mg/dL  CrCl: 76mL/min    K: 6.3  Na: 132          Glu: 73  LFT's: 16/5/89 TBili = 0.3      Drug Order   (Drug name, dose, route, IV Fluid & volume, frequency, number of doses) Cycle: 1 Day 1      Previous treatment: NEW CHEMO     Medication = Mitomycin  Base Dose= 10 mg/m2  Calc Dose: Base Dose x 1.56 = 15.6 mg  Final Dose = 15.6 mg  Route = IV   Fluid & Volume =  mL  Admin Duration = Over 15-30 min          <5% difference, OK to treat with final dose    Medication = Fluorouracil  Base Dose= 4000 mg/m2  Calc Dose: Base Dose x 1.56 = 6240 mg  Final Dose = 6240 ml  Route = CIV  Fluid & Volume = 124.8 mL (+ 3 ml of overfill) in a CADD cassette  Admin Duration = Over 96 hours          <5% difference, OK to treat with final dose      By my signature below, I confirm this process was performed independently with the BSA and all final chemotherapy dosing calculations congruent. I have reviewed the above chemotherapy order and that my calculation of the final dose and BSA (when applicable) corroborate those calculations of the  pharmacist. Discrepancies of 5% or greater in the written dose have " been addressed and documented within the EPIC Progress notes.    FLORIAN Mittal, PharmD

## 2017-10-03 NOTE — PROGRESS NOTES
Chemotherapy Verification - SECONDARY RN       Height = 156cm  Weight = 56.3kg  BSA = 1.56m2       Medication: Mitomycin  Dose: 10mg/m2  Calculated Dose: 15.6mg                             (In mg/m2, AUC, mg/kg)     Medication: Fluorouracil  Dose: 4000mg/m2  Calculated Dose: 6240mg over 96hrs                            (In mg/m2, AUC, mg/kg)      I confirm that this process was performed independently.

## 2017-10-07 ENCOUNTER — OUTPATIENT INFUSION SERVICES (OUTPATIENT)
Dept: ONCOLOGY | Facility: MEDICAL CENTER | Age: 62
End: 2017-10-07
Attending: INTERNAL MEDICINE
Payer: MEDICARE

## 2017-10-07 VITALS
DIASTOLIC BLOOD PRESSURE: 57 MMHG | RESPIRATION RATE: 18 BRPM | BODY MASS INDEX: 23.81 KG/M2 | SYSTOLIC BLOOD PRESSURE: 105 MMHG | TEMPERATURE: 97 F | HEIGHT: 61 IN | OXYGEN SATURATION: 97 % | WEIGHT: 126.1 LBS | HEART RATE: 94 BPM

## 2017-10-07 DIAGNOSIS — C21.0 ANAL SQUAMOUS CELL CARCINOMA (HCC): ICD-10-CM

## 2017-10-07 PROCEDURE — 96523 IRRIG DRUG DELIVERY DEVICE: CPT

## 2017-10-07 PROCEDURE — 99211 OFF/OP EST MAY X REQ PHY/QHP: CPT

## 2017-10-07 ASSESSMENT — PAIN SCALES - GENERAL: PAINLEVEL: NO PAIN

## 2017-10-08 NOTE — PROGRESS NOTES
Pt returns to infusion center for pump disconnect and PICC line flush.  PICC line flushed and pump disconnected at 4pm; reservoir volume is 0ml.  PICC line flushed with saline per policy, orange cap placed.  Pt left infusion center ambulatory and in good condition in care of caregiver, Radha.  Pt returns to infusion center on Tuesday for PICC line care and Neulasta.  Radha called and updated with plan of care.

## 2017-10-10 ENCOUNTER — OUTPATIENT INFUSION SERVICES (OUTPATIENT)
Dept: ONCOLOGY | Facility: MEDICAL CENTER | Age: 62
End: 2017-10-10
Attending: SPECIALIST
Payer: MEDICARE

## 2017-10-10 VITALS
TEMPERATURE: 97 F | DIASTOLIC BLOOD PRESSURE: 62 MMHG | HEART RATE: 99 BPM | OXYGEN SATURATION: 98 % | RESPIRATION RATE: 18 BRPM | HEIGHT: 61 IN | BODY MASS INDEX: 23.39 KG/M2 | WEIGHT: 123.9 LBS | SYSTOLIC BLOOD PRESSURE: 116 MMHG

## 2017-10-10 DIAGNOSIS — C21.0 ANAL SQUAMOUS CELL CARCINOMA (HCC): ICD-10-CM

## 2017-10-10 LAB
ANION GAP SERPL CALC-SCNC: 8 MMOL/L (ref 0–11.9)
BASOPHILS # BLD AUTO: 0.8 % (ref 0–1.8)
BASOPHILS # BLD: 0.03 K/UL (ref 0–0.12)
BUN SERPL-MCNC: 16 MG/DL (ref 8–22)
CALCIUM SERPL-MCNC: 8.5 MG/DL (ref 8.5–10.5)
CHLORIDE SERPL-SCNC: 100 MMOL/L (ref 96–112)
CO2 SERPL-SCNC: 24 MMOL/L (ref 20–33)
CREAT SERPL-MCNC: 0.79 MG/DL (ref 0.5–1.4)
EOSINOPHIL # BLD AUTO: 0.14 K/UL (ref 0–0.51)
EOSINOPHIL NFR BLD: 3.5 % (ref 0–6.9)
ERYTHROCYTE [DISTWIDTH] IN BLOOD BY AUTOMATED COUNT: 48.4 FL (ref 35.9–50)
GFR SERPL CREATININE-BSD FRML MDRD: >60 ML/MIN/1.73 M 2
GLUCOSE SERPL-MCNC: 212 MG/DL (ref 65–99)
HCT VFR BLD AUTO: 35.2 % (ref 37–47)
HGB BLD-MCNC: 11.6 G/DL (ref 12–16)
IMM GRANULOCYTES # BLD AUTO: 0.04 K/UL (ref 0–0.11)
IMM GRANULOCYTES NFR BLD AUTO: 1 % (ref 0–0.9)
LYMPHOCYTES # BLD AUTO: 0.24 K/UL (ref 1–4.8)
LYMPHOCYTES NFR BLD: 6 % (ref 22–41)
MCH RBC QN AUTO: 31.1 PG (ref 27–33)
MCHC RBC AUTO-ENTMCNC: 33 G/DL (ref 33.6–35)
MCV RBC AUTO: 94.4 FL (ref 81.4–97.8)
MONOCYTES # BLD AUTO: 0.05 K/UL (ref 0–0.85)
MONOCYTES NFR BLD AUTO: 1.3 % (ref 0–13.4)
NEUTROPHILS # BLD AUTO: 3.47 K/UL (ref 2–7.15)
NEUTROPHILS NFR BLD: 87.4 % (ref 44–72)
NRBC # BLD AUTO: 0 K/UL
NRBC BLD AUTO-RTO: 0 /100 WBC
PLATELET # BLD AUTO: 140 K/UL (ref 164–446)
PMV BLD AUTO: 9.6 FL (ref 9–12.9)
POTASSIUM SERPL-SCNC: 4.1 MMOL/L (ref 3.6–5.5)
RBC # BLD AUTO: 3.73 M/UL (ref 4.2–5.4)
SODIUM SERPL-SCNC: 132 MMOL/L (ref 135–145)
WBC # BLD AUTO: 4 K/UL (ref 4.8–10.8)

## 2017-10-10 PROCEDURE — 36592 COLLECT BLOOD FROM PICC: CPT

## 2017-10-10 PROCEDURE — 85025 COMPLETE CBC W/AUTO DIFF WBC: CPT

## 2017-10-10 PROCEDURE — 700111 HCHG RX REV CODE 636 W/ 250 OVERRIDE (IP): Performed by: SPECIALIST

## 2017-10-10 PROCEDURE — 80048 BASIC METABOLIC PNL TOTAL CA: CPT

## 2017-10-10 PROCEDURE — 96372 THER/PROPH/DIAG INJ SC/IM: CPT

## 2017-10-10 RX ADMIN — PEGFILGRASTIM 6 MG: 6 INJECTION SUBCUTANEOUS at 09:12

## 2017-10-10 ASSESSMENT — PAIN SCALES - GENERAL: PAINLEVEL: NO PAIN

## 2017-10-10 NOTE — PROGRESS NOTES
Pt arrived to [facility], ambulatory, for PICC care/labs/neulasta injection. Pt voices no complaints. Neulasta injected into the L-upper arm with no s/sx of adverse reaction. PICC line flushed per policy, positive blood return noted x2 lumens. Labs drawn per MD order. PICC line dressing changed with sterile field. Pt left IS with no s/sx of distress. Follow up appointment confirmed.

## 2017-10-17 ENCOUNTER — PATIENT OUTREACH (OUTPATIENT)
Dept: OTHER | Facility: MEDICAL CENTER | Age: 62
End: 2017-10-17

## 2017-10-17 ENCOUNTER — OUTPATIENT INFUSION SERVICES (OUTPATIENT)
Dept: ONCOLOGY | Facility: MEDICAL CENTER | Age: 62
End: 2017-10-17
Attending: SPECIALIST
Payer: MEDICARE

## 2017-10-17 VITALS
HEIGHT: 61 IN | HEART RATE: 98 BPM | DIASTOLIC BLOOD PRESSURE: 44 MMHG | BODY MASS INDEX: 23.35 KG/M2 | TEMPERATURE: 98.5 F | OXYGEN SATURATION: 98 % | RESPIRATION RATE: 18 BRPM | WEIGHT: 123.68 LBS | SYSTOLIC BLOOD PRESSURE: 105 MMHG

## 2017-10-17 DIAGNOSIS — C21.0 ANAL SQUAMOUS CELL CARCINOMA (HCC): ICD-10-CM

## 2017-10-17 LAB
ANION GAP SERPL CALC-SCNC: 6 MMOL/L (ref 0–11.9)
BASOPHILS # BLD AUTO: 0 % (ref 0–1.8)
BASOPHILS # BLD: 0 K/UL (ref 0–0.12)
BUN SERPL-MCNC: 12 MG/DL (ref 8–22)
CALCIUM SERPL-MCNC: 8.8 MG/DL (ref 8.5–10.5)
CHLORIDE SERPL-SCNC: 103 MMOL/L (ref 96–112)
CO2 SERPL-SCNC: 25 MMOL/L (ref 20–33)
CREAT SERPL-MCNC: 0.67 MG/DL (ref 0.5–1.4)
EOSINOPHIL # BLD AUTO: 0.16 K/UL (ref 0–0.51)
EOSINOPHIL NFR BLD: 0.9 % (ref 0–6.9)
ERYTHROCYTE [DISTWIDTH] IN BLOOD BY AUTOMATED COUNT: 47.9 FL (ref 35.9–50)
GFR SERPL CREATININE-BSD FRML MDRD: >60 ML/MIN/1.73 M 2
GLUCOSE SERPL-MCNC: 155 MG/DL (ref 65–99)
HCT VFR BLD AUTO: 36.1 % (ref 37–47)
HGB BLD-MCNC: 11.9 G/DL (ref 12–16)
LYMPHOCYTES # BLD AUTO: 0.47 K/UL (ref 1–4.8)
LYMPHOCYTES NFR BLD: 2.6 % (ref 22–41)
MANUAL DIFF BLD: NORMAL
MCH RBC QN AUTO: 30.7 PG (ref 27–33)
MCHC RBC AUTO-ENTMCNC: 33 G/DL (ref 33.6–35)
MCV RBC AUTO: 93.3 FL (ref 81.4–97.8)
METAMYELOCYTES NFR BLD MANUAL: 0.9 %
MONOCYTES # BLD AUTO: 0.78 K/UL (ref 0–0.85)
MONOCYTES NFR BLD AUTO: 4.3 % (ref 0–13.4)
MORPHOLOGY BLD-IMP: NORMAL
NEUTROPHILS # BLD AUTO: 16.62 K/UL (ref 2–7.15)
NEUTROPHILS NFR BLD: 87.8 % (ref 44–72)
NEUTS BAND NFR BLD MANUAL: 3.5 % (ref 0–10)
NRBC # BLD AUTO: 0 K/UL
NRBC BLD AUTO-RTO: 0 /100 WBC
PLATELET # BLD AUTO: 63 K/UL (ref 164–446)
PLATELET BLD QL SMEAR: NORMAL
PMV BLD AUTO: 8.8 FL (ref 9–12.9)
POTASSIUM SERPL-SCNC: 4 MMOL/L (ref 3.6–5.5)
RBC # BLD AUTO: 3.87 M/UL (ref 4.2–5.4)
RBC BLD AUTO: PRESENT
SODIUM SERPL-SCNC: 134 MMOL/L (ref 135–145)
TOXIC GRANULES BLD QL SMEAR: SLIGHT
WBC # BLD AUTO: 18.2 K/UL (ref 4.8–10.8)

## 2017-10-17 PROCEDURE — 85027 COMPLETE CBC AUTOMATED: CPT

## 2017-10-17 PROCEDURE — 85007 BL SMEAR W/DIFF WBC COUNT: CPT

## 2017-10-17 PROCEDURE — 80048 BASIC METABOLIC PNL TOTAL CA: CPT

## 2017-10-17 PROCEDURE — 36592 COLLECT BLOOD FROM PICC: CPT

## 2017-10-17 ASSESSMENT — PAIN SCALES - GENERAL: PAINLEVEL: NO PAIN

## 2017-10-17 NOTE — PROGRESS NOTES
Patient presents, with caregiver, for PICC care and lab draw. PICC dressing changed using sterile technique, flushes well with blood drawn as ordered. PICC insertion site and new sterile dressing clean, dry and intact. PICC flushed per protocol and site secured. Patient scheduled for next appointment and released in no acute distress with her caregiver.

## 2017-10-17 NOTE — PROGRESS NOTES
On 10/17/2017 at 0917 this ONN called patient's caregiver Radha Love, no answer, left voicemail with call back number.

## 2017-10-24 ENCOUNTER — OUTPATIENT INFUSION SERVICES (OUTPATIENT)
Dept: ONCOLOGY | Facility: MEDICAL CENTER | Age: 62
End: 2017-10-24
Attending: SPECIALIST
Payer: MEDICARE

## 2017-10-24 VITALS
OXYGEN SATURATION: 98 % | HEART RATE: 88 BPM | HEIGHT: 61 IN | RESPIRATION RATE: 18 BRPM | TEMPERATURE: 98.4 F | BODY MASS INDEX: 23.48 KG/M2 | DIASTOLIC BLOOD PRESSURE: 47 MMHG | SYSTOLIC BLOOD PRESSURE: 101 MMHG | WEIGHT: 124.34 LBS

## 2017-10-24 DIAGNOSIS — C21.0 ANAL SQUAMOUS CELL CARCINOMA (HCC): ICD-10-CM

## 2017-10-24 LAB
ANION GAP SERPL CALC-SCNC: 5 MMOL/L (ref 0–11.9)
BASOPHILS # BLD AUTO: 0.7 % (ref 0–1.8)
BASOPHILS # BLD: 0.05 K/UL (ref 0–0.12)
BUN SERPL-MCNC: 20 MG/DL (ref 8–22)
CALCIUM SERPL-MCNC: 9.1 MG/DL (ref 8.5–10.5)
CHLORIDE SERPL-SCNC: 106 MMOL/L (ref 96–112)
CO2 SERPL-SCNC: 27 MMOL/L (ref 20–33)
CREAT SERPL-MCNC: 0.64 MG/DL (ref 0.5–1.4)
EOSINOPHIL # BLD AUTO: 0.03 K/UL (ref 0–0.51)
EOSINOPHIL NFR BLD: 0.4 % (ref 0–6.9)
ERYTHROCYTE [DISTWIDTH] IN BLOOD BY AUTOMATED COUNT: 49.2 FL (ref 35.9–50)
GFR SERPL CREATININE-BSD FRML MDRD: >60 ML/MIN/1.73 M 2
GLUCOSE SERPL-MCNC: 87 MG/DL (ref 65–99)
HCT VFR BLD AUTO: 33.7 % (ref 37–47)
HGB BLD-MCNC: 11.2 G/DL (ref 12–16)
IMM GRANULOCYTES # BLD AUTO: 0.08 K/UL (ref 0–0.11)
IMM GRANULOCYTES NFR BLD AUTO: 1.1 % (ref 0–0.9)
LYMPHOCYTES # BLD AUTO: 0.43 K/UL (ref 1–4.8)
LYMPHOCYTES NFR BLD: 5.7 % (ref 22–41)
MCH RBC QN AUTO: 31.7 PG (ref 27–33)
MCHC RBC AUTO-ENTMCNC: 33.2 G/DL (ref 33.6–35)
MCV RBC AUTO: 95.5 FL (ref 81.4–97.8)
MONOCYTES # BLD AUTO: 0.44 K/UL (ref 0–0.85)
MONOCYTES NFR BLD AUTO: 5.8 % (ref 0–13.4)
NEUTROPHILS # BLD AUTO: 6.52 K/UL (ref 2–7.15)
NEUTROPHILS NFR BLD: 86.3 % (ref 44–72)
NRBC # BLD AUTO: 0 K/UL
NRBC BLD AUTO-RTO: 0 /100 WBC
PLATELET # BLD AUTO: 113 K/UL (ref 164–446)
PMV BLD AUTO: 9.9 FL (ref 9–12.9)
POTASSIUM SERPL-SCNC: 4.2 MMOL/L (ref 3.6–5.5)
RBC # BLD AUTO: 3.53 M/UL (ref 4.2–5.4)
SODIUM SERPL-SCNC: 138 MMOL/L (ref 135–145)
WBC # BLD AUTO: 7.6 K/UL (ref 4.8–10.8)

## 2017-10-24 PROCEDURE — 85025 COMPLETE CBC W/AUTO DIFF WBC: CPT

## 2017-10-24 PROCEDURE — 36592 COLLECT BLOOD FROM PICC: CPT

## 2017-10-24 PROCEDURE — 80048 BASIC METABOLIC PNL TOTAL CA: CPT

## 2017-10-24 ASSESSMENT — PAIN SCALES - GENERAL: PAINLEVEL: 1=MINIMAL PAIN

## 2017-10-25 NOTE — PROGRESS NOTES
Pt to infusion services ambulatory per self and accompanied by caregiver, Radha.  Pt here for scheduled labs and PICC care.  Plan of care reviewed.  Pt verbalizes understanding.  No complaints or concerns offered.  Pt with dual lumen PICC line to [facility].  PICC flushed with normal saline.  Both lumens flush easily; + blood return verified.  Labs drawn per MD orders.  Blood samples sent to lab.  PICC flushed with normal saline per policy.  PICC dressing changed utilizing sterile technique.  Pt tolerated well.  Line secured.  Pt released to self care and care of caregiver in no apparent distress after completion of treatment, ambulatory.  Future appointments confirmed and list of appointments provided.

## 2017-10-30 ENCOUNTER — TELEPHONE (OUTPATIENT)
Dept: ONCOLOGY | Facility: MEDICAL CENTER | Age: 62
End: 2017-10-30

## 2017-10-30 NOTE — TELEPHONE ENCOUNTER
Called patient to inform her that she missed her lab appointment today.  Spoke with family member who stated pt saw Dr. Cervantes today and had labs drawn in MD office.  Pt to bring in copy of lab results to chemotherapy appointment tomorrow, 10/31.

## 2017-10-31 ENCOUNTER — OUTPATIENT INFUSION SERVICES (OUTPATIENT)
Dept: ONCOLOGY | Facility: MEDICAL CENTER | Age: 62
End: 2017-10-31
Attending: SPECIALIST
Payer: MEDICARE

## 2017-10-31 VITALS
OXYGEN SATURATION: 96 % | BODY MASS INDEX: 23.18 KG/M2 | HEART RATE: 93 BPM | HEIGHT: 61 IN | TEMPERATURE: 99 F | DIASTOLIC BLOOD PRESSURE: 43 MMHG | WEIGHT: 122.8 LBS | SYSTOLIC BLOOD PRESSURE: 108 MMHG | RESPIRATION RATE: 18 BRPM

## 2017-10-31 DIAGNOSIS — C21.0 ANAL SQUAMOUS CELL CARCINOMA (HCC): ICD-10-CM

## 2017-10-31 PROCEDURE — 700105 HCHG RX REV CODE 258: Performed by: SPECIALIST

## 2017-10-31 PROCEDURE — 700111 HCHG RX REV CODE 636 W/ 250 OVERRIDE (IP)

## 2017-10-31 PROCEDURE — 700105 HCHG RX REV CODE 258

## 2017-10-31 PROCEDURE — 700111 HCHG RX REV CODE 636 W/ 250 OVERRIDE (IP): Performed by: SPECIALIST

## 2017-10-31 PROCEDURE — G0498 CHEMO EXTEND IV INFUS W/PUMP: HCPCS

## 2017-10-31 PROCEDURE — 96413 CHEMO IV INFUSION 1 HR: CPT

## 2017-10-31 PROCEDURE — 96375 TX/PRO/DX INJ NEW DRUG ADDON: CPT

## 2017-10-31 RX ORDER — DEXAMETHASONE SODIUM PHOSPHATE 4 MG/ML
12 INJECTION, SOLUTION INTRA-ARTICULAR; INTRALESIONAL; INTRAMUSCULAR; INTRAVENOUS; SOFT TISSUE ONCE
Status: COMPLETED | OUTPATIENT
Start: 2017-10-31 | End: 2017-10-31

## 2017-10-31 RX ORDER — SODIUM CHLORIDE 9 MG/ML
INJECTION, SOLUTION INTRAVENOUS CONTINUOUS
Status: DISCONTINUED | OUTPATIENT
Start: 2017-10-31 | End: 2017-10-31 | Stop reason: HOSPADM

## 2017-10-31 RX ADMIN — SODIUM CHLORIDE: 9 INJECTION, SOLUTION INTRAVENOUS at 16:00

## 2017-10-31 RX ADMIN — DEXAMETHASONE SODIUM PHOSPHATE 12 MG: 4 INJECTION, SOLUTION INTRAMUSCULAR; INTRAVENOUS at 16:01

## 2017-10-31 RX ADMIN — SODIUM CHLORIDE 15.5 MG: 9 INJECTION, SOLUTION INTRAVENOUS at 16:30

## 2017-10-31 RX ADMIN — FLUOROURACIL 6200 MG: 50 INJECTION, SOLUTION INTRAVENOUS at 17:08

## 2017-10-31 RX ADMIN — ONDANSETRON HYDROCHLORIDE 16 MG: 2 INJECTION, SOLUTION INTRAMUSCULAR; INTRAVENOUS at 16:00

## 2017-10-31 ASSESSMENT — PAIN SCALES - GENERAL: PAINLEVEL: NO PAIN

## 2017-10-31 NOTE — PROGRESS NOTES
"Pharmacy Chemotherapy Note    Patient Name: ANDRES BANUELOS  Dx: Anal Cancer         Protocol:  FLUOROURACIL + MITOMYCIN + XRT       *Dosing Reference*  Fluorouracil 1000 mg/m2 CIVI over 24 hours on day 1-4 and 29-32 of radiation  Mitomycin 10 mg/m2 IV push on day 1 and 29  35 day cycle X 1 cycle  NCCN guidelines for anal carcinoma V.1.2017  Xiomara M, et al. J Clin Oncol. 1996:14(9):2527  Savage ARMIJO, et al. DAVE. 2008: 299(16): 1914-21    /43   Pulse 93   Temp 37.2 °C (99 °F)   Resp 18   Ht 1.56 m (5' 1.42\")   Wt 55.7 kg (122 lb 12.7 oz)   LMP 07/03/2000 (Approximate)   SpO2 96%   BMI 22.89 kg/m²  Body surface area is 1.55 meters squared.     10/30/17CCS  ANC ~ 7150     Plt = 168k   Hgb = 12.1   SCr = 0.62 mg/dL  CrCl ~ 75mL/min    K = 4.4  LFT's =AST/ALT/AP = 21/32/150  TBili <0.2      Drug Order   (Drug name, dose, route, IV Fluid & volume, frequency, number of doses) Cycle 1, Day 29      Previous treatment: C1D1 = 10/03/17     Medication = Mitomycin (Mutamycin)  Base Dose= 10 mg/m2  Calc Dose: Base Dose x 1.55 m2 = 15.5 mg  Final Dose = 15.5 mg  Route = IV   Fluid & Volume =  mL  Admin Duration = Over 15-30 min          <5% difference, OK to treat with final dose    Medication = Fluorouracil (Adrucil)  Base Dose= 4000 mg/m2  Calc Dose: Base Dose x 1.55 m2 = 6200 mg  Final Dose = 6200 mg   Route = CIV  Fluid & Volume = 124 mL (+ 3 ml of overfill) in a CADD cassette  Admin Duration = Over 96 hours @ 1.3 mL/hr          <5% difference, OK to treat with final dose      By my signature below, I confirm this process was performed independently with the BSA and all final chemotherapy dosing calculations congruent. I have reviewed the above chemotherapy order and that my calculation of the final dose and BSA (when applicable) corroborate those calculations of the  pharmacist. Discrepancies of 5% or greater in the written dose have been addressed and documented within the EPIC Progress " notes.    Yovany Reed, PharmD

## 2017-10-31 NOTE — PROGRESS NOTES
"Pharmacy Chemotherapy Calculations    Dx: Anal Cancer  Cycle: 1, Day 29 (Previous treatment = Oct 3-7, 2017 = Days 1-4)  Regimen and Dosing Reference   Fluorouracil 1000 mg/m2 CIVI over 24 hours on Days 1-4 and 29-32 of radiation- dispensed as 4000 mg/m2 over 96 hours  Mitomycin 10 mg/m2 IV Days 1 and 29  35 day cycle for 1 cycle      NCCN guidelines for anal carcinoma V.1.2017   Xiomara M, et al. J Clin Oncol. 1996;14(9):2527-39    /43   Pulse 93   Temp 37.2 °C (99 °F)   Resp 18   Ht 1.56 m (5' 1.42\")   Wt 55.7 kg (122 lb 12.7 oz)   LMP 07/03/2000 (Approximate)   SpO2 96%   BMI 22.89 kg/m²  Body surface area is 1.55 meters squared.      10/30/17 CCS:  ANC ~ 7300     Plt = 168 k Hgb = 12.1     Scr =  0.62      Crcl ~ 78ml/min  (min cr 0.7)    LFTs = WNL except AP = 150 TBili = <0.2  K = 4.4    Mitomycin 10 mg/m2 x 1.55 m2 = 15.5 mg   <5% difference, OK to treat with final dose = 15.5 mg IV    Fluorouracil 4000 mg/m2 x 1.55 m2 = 6200 mg   <5% difference, OK to treat with final dose = 6200 mg IV   Via home infusion pump at 1.3 ml/hr for 96 hours (Days 29-32)    Manuel Chang, PharmD    "

## 2017-10-31 NOTE — PROGRESS NOTES
Chemotherapy Verification - PRIMARY RN      Height = 156 cm  Weight = 55.7 kg  BSA = 1.55 m2       Medication: Mitomycin  Dose: 10 mg/m2  Calculated Dose: 15.5 mg                                 Medication: Fluorouracil Home pump  Dose: 4000 mg/m2  Calculated Dose: 6200 mg over 96 hours                                 I confirm this process was performed independently with the BSA and all final chemotherapy dosing calculations congruent.  Any discrepancies of 5% or greater have been addressed with the chemotherapy pharmacist. The resolution of the discrepancy has been documented in the EPIC progress notes.

## 2017-10-31 NOTE — PROGRESS NOTES
Chemotherapy Verification - SECONDARY RN       Height = 61.42in  Weight = 55.7kg  BSA = 1.55m2       Medication: Mitomycin  Dose: 10mg/m2  Calculated Dose: 15.5mg                             (In mg/m2, AUC, mg/kg)     Medication: Fluorouracil  Dose: 4000mg/m2  Calculated Dose: 6200mg (continuous pump over 96 hours)                             (In mg/m2, AUC, mg/kg)    10/30/17 Labs reviewed, paper, in chart    I confirm that this process was performed independently.

## 2017-11-01 NOTE — PROGRESS NOTES
Pt arrived to [facility] ambulatory, here with brother Greg and caregiver Radha, for D29 Mitomycin/5FU home pump for anal cancer. Pt with R PICC in place. Pt states she had her labs drawn at MD office yesterday, caregiver hand-carried in lab results for CBC only. Call placed to MD office and requested CMP results. CMP results faxed. All lab results reviewed and WNL for chemo today. Both lumens of PICC flushed, flush easy but unable to verify blood return. Pt repositioned and NS bolus given and positive blood return observed. Premeds given and Mitomycin infused as prescribed. Dressing to PICC changed in sterile field. Claves changed. Mitomycin completed. Pt attached to home pump, settings checked with 2 RNs. Pt knows to return in 4 days. May need new appts for next cycle as none are in place at this time. Verified pump was running. Pt discharged home under care of caregiver and brother in no apparent distress. Returns Saturday.

## 2017-11-04 ENCOUNTER — OUTPATIENT INFUSION SERVICES (OUTPATIENT)
Dept: ONCOLOGY | Facility: MEDICAL CENTER | Age: 62
End: 2017-11-04
Attending: INTERNAL MEDICINE
Payer: MEDICARE

## 2017-11-04 VITALS
TEMPERATURE: 97.1 F | HEART RATE: 100 BPM | WEIGHT: 126.1 LBS | OXYGEN SATURATION: 97 % | SYSTOLIC BLOOD PRESSURE: 91 MMHG | DIASTOLIC BLOOD PRESSURE: 50 MMHG | RESPIRATION RATE: 18 BRPM | HEIGHT: 61 IN | BODY MASS INDEX: 23.81 KG/M2

## 2017-11-04 DIAGNOSIS — C21.0 ANAL SQUAMOUS CELL CARCINOMA (HCC): ICD-10-CM

## 2017-11-04 PROCEDURE — 99211 OFF/OP EST MAY X REQ PHY/QHP: CPT

## 2017-11-04 PROCEDURE — 700111 HCHG RX REV CODE 636 W/ 250 OVERRIDE (IP)

## 2017-11-04 RX ADMIN — HEPARIN 500 UNITS: 100 SYRINGE at 17:55

## 2017-11-04 ASSESSMENT — PAIN SCALES - GENERAL: PAINLEVEL: NO PAIN

## 2017-11-05 NOTE — PROGRESS NOTES
Here for 5 FU pump disconnect after 96 hrs of continuous infusion. See chemotherapy flow sheet for volume / dose administration. PICC flushed per protocol. Patient discharge home with care giver  in NAD. Next appointment confirmed for next Tuesday PICC dressing.

## 2017-11-07 ENCOUNTER — OUTPATIENT INFUSION SERVICES (OUTPATIENT)
Dept: ONCOLOGY | Facility: MEDICAL CENTER | Age: 62
End: 2017-11-07
Attending: INTERNAL MEDICINE
Payer: MEDICARE

## 2017-11-07 VITALS
DIASTOLIC BLOOD PRESSURE: 62 MMHG | BODY MASS INDEX: 23.64 KG/M2 | HEART RATE: 99 BPM | SYSTOLIC BLOOD PRESSURE: 120 MMHG | RESPIRATION RATE: 18 BRPM | WEIGHT: 125.22 LBS | HEIGHT: 61 IN | TEMPERATURE: 97.1 F | OXYGEN SATURATION: 98 %

## 2017-11-07 DIAGNOSIS — C21.0 ANAL SQUAMOUS CELL CARCINOMA (HCC): ICD-10-CM

## 2017-11-07 LAB
ANION GAP SERPL CALC-SCNC: 7 MMOL/L (ref 0–11.9)
BASOPHILS # BLD AUTO: 0.2 % (ref 0–1.8)
BASOPHILS # BLD: 0.01 K/UL (ref 0–0.12)
BUN SERPL-MCNC: 17 MG/DL (ref 8–22)
CALCIUM SERPL-MCNC: 8.5 MG/DL (ref 8.5–10.5)
CHLORIDE SERPL-SCNC: 105 MMOL/L (ref 96–112)
CO2 SERPL-SCNC: 24 MMOL/L (ref 20–33)
CREAT SERPL-MCNC: 0.6 MG/DL (ref 0.5–1.4)
EOSINOPHIL # BLD AUTO: 0.18 K/UL (ref 0–0.51)
EOSINOPHIL NFR BLD: 3 % (ref 0–6.9)
ERYTHROCYTE [DISTWIDTH] IN BLOOD BY AUTOMATED COUNT: 57.1 FL (ref 35.9–50)
GFR SERPL CREATININE-BSD FRML MDRD: >60 ML/MIN/1.73 M 2
GLUCOSE SERPL-MCNC: 132 MG/DL (ref 65–99)
HCT VFR BLD AUTO: 33.9 % (ref 37–47)
HGB BLD-MCNC: 11.1 G/DL (ref 12–16)
IMM GRANULOCYTES # BLD AUTO: 0.07 K/UL (ref 0–0.11)
IMM GRANULOCYTES NFR BLD AUTO: 1.2 % (ref 0–0.9)
LYMPHOCYTES # BLD AUTO: 0.08 K/UL (ref 1–4.8)
LYMPHOCYTES NFR BLD: 1.3 % (ref 22–41)
MCH RBC QN AUTO: 31.5 PG (ref 27–33)
MCHC RBC AUTO-ENTMCNC: 32.7 G/DL (ref 33.6–35)
MCV RBC AUTO: 96.3 FL (ref 81.4–97.8)
MONOCYTES # BLD AUTO: 0.1 K/UL (ref 0–0.85)
MONOCYTES NFR BLD AUTO: 1.7 % (ref 0–13.4)
NEUTROPHILS # BLD AUTO: 5.52 K/UL (ref 2–7.15)
NEUTROPHILS NFR BLD: 92.6 % (ref 44–72)
NRBC # BLD AUTO: 0 K/UL
NRBC BLD AUTO-RTO: 0 /100 WBC
PLATELET # BLD AUTO: 121 K/UL (ref 164–446)
PMV BLD AUTO: 9.1 FL (ref 9–12.9)
POTASSIUM SERPL-SCNC: 3.9 MMOL/L (ref 3.6–5.5)
RBC # BLD AUTO: 3.52 M/UL (ref 4.2–5.4)
SODIUM SERPL-SCNC: 136 MMOL/L (ref 135–145)
WBC # BLD AUTO: 6 K/UL (ref 4.8–10.8)

## 2017-11-07 PROCEDURE — 700111 HCHG RX REV CODE 636 W/ 250 OVERRIDE (IP): Performed by: SPECIALIST

## 2017-11-07 PROCEDURE — 85025 COMPLETE CBC W/AUTO DIFF WBC: CPT

## 2017-11-07 PROCEDURE — 99211 OFF/OP EST MAY X REQ PHY/QHP: CPT | Mod: 25

## 2017-11-07 PROCEDURE — 96372 THER/PROPH/DIAG INJ SC/IM: CPT

## 2017-11-07 PROCEDURE — 80048 BASIC METABOLIC PNL TOTAL CA: CPT

## 2017-11-07 RX ADMIN — PEGFILGRASTIM 6 MG: 6 INJECTION SUBCUTANEOUS at 08:30

## 2017-11-07 ASSESSMENT — PAIN SCALES - GENERAL: PAINLEVEL: 5=MODERATE PAIN

## 2017-11-07 NOTE — PROGRESS NOTES
Patient presents for PICC care and Neulasta injection. Reviewed plan of care, patient verbalizes understanding. PICC dressing changed using sterile technique. PICC insertion site and new sterile dressing clean, dry and intact. Neulasta given as ordered. Patient scheduled for next appointment and released in no acute distress with her caregiver.

## 2017-11-14 ENCOUNTER — OUTPATIENT INFUSION SERVICES (OUTPATIENT)
Dept: ONCOLOGY | Facility: MEDICAL CENTER | Age: 62
End: 2017-11-14
Attending: INTERNAL MEDICINE
Payer: MEDICARE

## 2017-11-14 VITALS
HEART RATE: 100 BPM | DIASTOLIC BLOOD PRESSURE: 49 MMHG | BODY MASS INDEX: 23.48 KG/M2 | TEMPERATURE: 97.6 F | RESPIRATION RATE: 18 BRPM | SYSTOLIC BLOOD PRESSURE: 102 MMHG | OXYGEN SATURATION: 97 % | HEIGHT: 61 IN | WEIGHT: 124.34 LBS

## 2017-11-14 DIAGNOSIS — C21.0 ANAL SQUAMOUS CELL CARCINOMA (HCC): ICD-10-CM

## 2017-11-14 LAB
ANION GAP SERPL CALC-SCNC: 7 MMOL/L (ref 0–11.9)
ANISOCYTOSIS BLD QL SMEAR: ABNORMAL
BASOPHILS # BLD AUTO: 0 % (ref 0–1.8)
BASOPHILS # BLD: 0 K/UL (ref 0–0.12)
BUN SERPL-MCNC: 12 MG/DL (ref 8–22)
CALCIUM SERPL-MCNC: 8.2 MG/DL (ref 8.5–10.5)
CHLORIDE SERPL-SCNC: 106 MMOL/L (ref 96–112)
CO2 SERPL-SCNC: 25 MMOL/L (ref 20–33)
CREAT SERPL-MCNC: 0.7 MG/DL (ref 0.5–1.4)
EOSINOPHIL # BLD AUTO: 0.24 K/UL (ref 0–0.51)
EOSINOPHIL NFR BLD: 1.7 % (ref 0–6.9)
ERYTHROCYTE [DISTWIDTH] IN BLOOD BY AUTOMATED COUNT: 58.2 FL (ref 35.9–50)
GFR SERPL CREATININE-BSD FRML MDRD: >60 ML/MIN/1.73 M 2
GLUCOSE SERPL-MCNC: 132 MG/DL (ref 65–99)
HCT VFR BLD AUTO: 30.5 % (ref 37–47)
HGB BLD-MCNC: 10.1 G/DL (ref 12–16)
LYMPHOCYTES # BLD AUTO: 0 K/UL (ref 1–4.8)
LYMPHOCYTES NFR BLD: 0 % (ref 22–41)
MANUAL DIFF BLD: NORMAL
MCH RBC QN AUTO: 31.9 PG (ref 27–33)
MCHC RBC AUTO-ENTMCNC: 33.1 G/DL (ref 33.6–35)
MCV RBC AUTO: 96.2 FL (ref 81.4–97.8)
METAMYELOCYTES NFR BLD MANUAL: 0.9 %
MONOCYTES # BLD AUTO: 0.5 K/UL (ref 0–0.85)
MONOCYTES NFR BLD AUTO: 3.5 % (ref 0–13.4)
MORPHOLOGY BLD-IMP: NORMAL
NEUTROPHILS # BLD AUTO: 13.43 K/UL (ref 2–7.15)
NEUTROPHILS NFR BLD: 87.8 % (ref 44–72)
NEUTS BAND NFR BLD MANUAL: 6.1 % (ref 0–10)
NRBC # BLD AUTO: 0 K/UL
NRBC BLD AUTO-RTO: 0 /100 WBC
PLATELET # BLD AUTO: 101 K/UL (ref 164–446)
PLATELET BLD QL SMEAR: NORMAL
PMV BLD AUTO: 9.6 FL (ref 9–12.9)
POTASSIUM SERPL-SCNC: 3.6 MMOL/L (ref 3.6–5.5)
RBC # BLD AUTO: 3.17 M/UL (ref 4.2–5.4)
RBC BLD AUTO: PRESENT
SODIUM SERPL-SCNC: 138 MMOL/L (ref 135–145)
TOXIC GRANULES BLD QL SMEAR: SLIGHT
WBC # BLD AUTO: 14.3 K/UL (ref 4.8–10.8)

## 2017-11-14 PROCEDURE — 36592 COLLECT BLOOD FROM PICC: CPT

## 2017-11-14 PROCEDURE — 80048 BASIC METABOLIC PNL TOTAL CA: CPT

## 2017-11-14 PROCEDURE — 85007 BL SMEAR W/DIFF WBC COUNT: CPT

## 2017-11-14 PROCEDURE — 85027 COMPLETE CBC AUTOMATED: CPT

## 2017-11-14 ASSESSMENT — PAIN SCALES - GENERAL: PAINLEVEL: 2=MINIMAL-SLIGHT

## 2017-11-14 NOTE — PROGRESS NOTES
Pt presents ambulatory with caregiver for labs and PICC drsg change. PICC accessed and blood return noted via both lumens, labs drawn. PICC drsg changed with sterile technique and site secured with gauze and arm wrap applied. Pt to see Dr. Cervantes this afternoon. Pt scheduled for PICC care next week, RN confirmed appt with caregiver and pt. Pt left ambulatory with caregiver in no distress at 0849.     RN reviewed labs at 1535, elevated WBC and ANC noted, pt did not report any s/s of infection. RN notified Dr. Cervantes of lab values and he confirmed pt reported no s/s of infection with appt today. POC discussed for future appts with pt, order received to draw labs next week and d/c PICC at that time, RN updated treatment plan.

## 2017-11-21 ENCOUNTER — OUTPATIENT INFUSION SERVICES (OUTPATIENT)
Dept: ONCOLOGY | Facility: MEDICAL CENTER | Age: 62
End: 2017-11-21
Attending: INTERNAL MEDICINE
Payer: MEDICARE

## 2017-11-21 VITALS
TEMPERATURE: 98.4 F | WEIGHT: 126.1 LBS | DIASTOLIC BLOOD PRESSURE: 56 MMHG | HEIGHT: 61 IN | RESPIRATION RATE: 18 BRPM | BODY MASS INDEX: 23.81 KG/M2 | OXYGEN SATURATION: 100 % | HEART RATE: 86 BPM | SYSTOLIC BLOOD PRESSURE: 109 MMHG

## 2017-11-21 DIAGNOSIS — C21.0 ANAL SQUAMOUS CELL CARCINOMA (HCC): ICD-10-CM

## 2017-11-21 LAB
ALBUMIN SERPL BCP-MCNC: 2.8 G/DL (ref 3.2–4.9)
ALBUMIN/GLOB SERPL: 0.9 G/DL
ALP SERPL-CCNC: 86 U/L (ref 30–99)
ALT SERPL-CCNC: <5 U/L (ref 2–50)
ANION GAP SERPL CALC-SCNC: 4 MMOL/L (ref 0–11.9)
ANISOCYTOSIS BLD QL SMEAR: ABNORMAL
AST SERPL-CCNC: 16 U/L (ref 12–45)
BASOPHILS # BLD AUTO: 0.8 % (ref 0–1.8)
BASOPHILS # BLD: 0.07 K/UL (ref 0–0.12)
BILIRUB SERPL-MCNC: 0.2 MG/DL (ref 0.1–1.5)
BUN SERPL-MCNC: 15 MG/DL (ref 8–22)
CALCIUM SERPL-MCNC: 8.2 MG/DL (ref 8.5–10.5)
CHLORIDE SERPL-SCNC: 108 MMOL/L (ref 96–112)
CO2 SERPL-SCNC: 24 MMOL/L (ref 20–33)
CREAT SERPL-MCNC: 0.69 MG/DL (ref 0.5–1.4)
EOSINOPHIL # BLD AUTO: 0 K/UL (ref 0–0.51)
EOSINOPHIL NFR BLD: 0 % (ref 0–6.9)
ERYTHROCYTE [DISTWIDTH] IN BLOOD BY AUTOMATED COUNT: 67.9 FL (ref 35.9–50)
GFR SERPL CREATININE-BSD FRML MDRD: >60 ML/MIN/1.73 M 2
GLOBULIN SER CALC-MCNC: 3 G/DL (ref 1.9–3.5)
GLUCOSE SERPL-MCNC: 118 MG/DL (ref 65–99)
HCT VFR BLD AUTO: 30.2 % (ref 37–47)
HGB BLD-MCNC: 9.9 G/DL (ref 12–16)
LYMPHOCYTES # BLD AUTO: 0.16 K/UL (ref 1–4.8)
LYMPHOCYTES NFR BLD: 1.7 % (ref 22–41)
MACROCYTES BLD QL SMEAR: ABNORMAL
MANUAL DIFF BLD: NORMAL
MCH RBC QN AUTO: 32.6 PG (ref 27–33)
MCHC RBC AUTO-ENTMCNC: 32.8 G/DL (ref 33.6–35)
MCV RBC AUTO: 99.3 FL (ref 81.4–97.8)
MONOCYTES # BLD AUTO: 0.3 K/UL (ref 0–0.85)
MONOCYTES NFR BLD AUTO: 3.3 % (ref 0–13.4)
MORPHOLOGY BLD-IMP: NORMAL
NEUTROPHILS # BLD AUTO: 8.67 K/UL (ref 2–7.15)
NEUTROPHILS NFR BLD: 94.2 % (ref 44–72)
NRBC # BLD AUTO: 0 K/UL
NRBC BLD AUTO-RTO: 0 /100 WBC
PLATELET # BLD AUTO: 213 K/UL (ref 164–446)
PLATELET BLD QL SMEAR: NORMAL
PMV BLD AUTO: 9.2 FL (ref 9–12.9)
POTASSIUM SERPL-SCNC: 5.3 MMOL/L (ref 3.6–5.5)
PROT SERPL-MCNC: 5.8 G/DL (ref 6–8.2)
RBC # BLD AUTO: 3.04 M/UL (ref 4.2–5.4)
RBC BLD AUTO: PRESENT
SODIUM SERPL-SCNC: 136 MMOL/L (ref 135–145)
TOXIC GRANULES BLD QL SMEAR: NORMAL
WBC # BLD AUTO: 9.2 K/UL (ref 4.8–10.8)

## 2017-11-21 PROCEDURE — 306780 HCHG STAT FOR TRANSFUSION PER CASE

## 2017-11-21 PROCEDURE — 36591 DRAW BLOOD OFF VENOUS DEVICE: CPT

## 2017-11-21 PROCEDURE — 85027 COMPLETE CBC AUTOMATED: CPT

## 2017-11-21 PROCEDURE — 80053 COMPREHEN METABOLIC PANEL: CPT

## 2017-11-21 PROCEDURE — 85007 BL SMEAR W/DIFF WBC COUNT: CPT

## 2017-11-21 ASSESSMENT — PAIN SCALES - GENERAL: PAINLEVEL: NO PAIN

## 2017-11-22 NOTE — PROGRESS NOTES
Pt to infusion services ambulatory per self and accompanied by caregiver.  Pt here for scheduled labs from PICC line and PICC line removal.  Plan of care reviewed.  Pt and caregiver verbalize understanding.  PICC line removal and discharge instructions reviewed with patient and caregiver; Discharge instructions post PICC line removal handout provided to patient and caregiver.  Both patient and caregiver verbalize understanding.  Pt with PICC line to [facility].  Dual lumen PICC line flushed with normal saline; + blood return verified from both lumens.  Labs drawn per MD orders.  PICC flushed with normal saline.  PICC line removed per MD orders per policy and sterile 2x2 gauze dressing placed and covered with transparent dressing.  Pressure applied to site x 5 minutes by this RN.  Pressure then applied to site by patient for additional 5 minutes.  Pt observed for total of 30 minutes.  Dressing observed to be clean, dry, and intact.  Pt denies chest pain or SOB.  Pt released to self care and care of caregiver in no apparent distress after completion of treatment, ambulatory.  No further appointments at this time.  Pt and caregiver tell me patient will f/u with Dr. Cervantes on 12/14/17.

## 2017-11-28 ENCOUNTER — APPOINTMENT (OUTPATIENT)
Dept: ONCOLOGY | Facility: MEDICAL CENTER | Age: 62
End: 2017-11-28
Attending: INTERNAL MEDICINE
Payer: MEDICARE

## 2017-12-05 ENCOUNTER — APPOINTMENT (OUTPATIENT)
Dept: ONCOLOGY | Facility: MEDICAL CENTER | Age: 62
End: 2017-12-05
Attending: INTERNAL MEDICINE
Payer: MEDICARE

## 2018-01-04 ENCOUNTER — HOSPITAL ENCOUNTER (OUTPATIENT)
Dept: HOSPITAL 8 - ROC | Age: 63
Discharge: HOME | End: 2018-01-04
Attending: RADIOLOGY
Payer: MEDICARE

## 2018-01-04 DIAGNOSIS — Z92.3: ICD-10-CM

## 2018-01-04 DIAGNOSIS — Z08: Primary | ICD-10-CM

## 2018-01-04 DIAGNOSIS — C21.1: ICD-10-CM

## 2018-01-04 DIAGNOSIS — F20.0: ICD-10-CM

## 2018-01-04 PROCEDURE — G0463 HOSPITAL OUTPT CLINIC VISIT: HCPCS

## 2018-02-21 ENCOUNTER — OFFICE VISIT (OUTPATIENT)
Dept: URGENT CARE | Facility: CLINIC | Age: 63
End: 2018-02-21
Payer: MEDICARE

## 2018-02-21 ENCOUNTER — APPOINTMENT (OUTPATIENT)
Dept: RADIOLOGY | Facility: IMAGING CENTER | Age: 63
End: 2018-02-21
Attending: EMERGENCY MEDICINE
Payer: MEDICARE

## 2018-02-21 VITALS
WEIGHT: 133 LBS | HEART RATE: 109 BPM | TEMPERATURE: 99.4 F | RESPIRATION RATE: 12 BRPM | SYSTOLIC BLOOD PRESSURE: 110 MMHG | HEIGHT: 61 IN | OXYGEN SATURATION: 94 % | BODY MASS INDEX: 25.11 KG/M2 | DIASTOLIC BLOOD PRESSURE: 68 MMHG

## 2018-02-21 DIAGNOSIS — J11.00 INFLUENZAL PNEUMONIA: ICD-10-CM

## 2018-02-21 DIAGNOSIS — R05.9 COUGH: ICD-10-CM

## 2018-02-21 LAB
FLUAV+FLUBV AG SPEC QL IA: NEGATIVE
INT CON NEG: NEGATIVE
INT CON POS: POSITIVE

## 2018-02-21 PROCEDURE — 87804 INFLUENZA ASSAY W/OPTIC: CPT | Performed by: EMERGENCY MEDICINE

## 2018-02-21 PROCEDURE — 71046 X-RAY EXAM CHEST 2 VIEWS: CPT | Mod: TC | Performed by: EMERGENCY MEDICINE

## 2018-02-21 PROCEDURE — 99203 OFFICE O/P NEW LOW 30 MIN: CPT | Performed by: EMERGENCY MEDICINE

## 2018-02-21 RX ORDER — OSELTAMIVIR PHOSPHATE 75 MG/1
75 CAPSULE ORAL 2 TIMES DAILY
Qty: 10 CAP | Refills: 0 | Status: SHIPPED | OUTPATIENT
Start: 2018-02-21 | End: 2018-11-05

## 2018-02-21 RX ORDER — ASCORBIC ACID 500 MG
500 TABLET ORAL DAILY
COMMUNITY
End: 2021-10-07

## 2018-02-21 RX ORDER — MULTIVITAMIN WITH IRON
TABLET ORAL
COMMUNITY
End: 2022-10-26

## 2018-02-21 RX ORDER — CEFUROXIME AXETIL 500 MG/1
500 TABLET ORAL 2 TIMES DAILY
Qty: 14 TAB | Refills: 0 | Status: SHIPPED | OUTPATIENT
Start: 2018-02-21 | End: 2018-02-28

## 2018-02-21 RX ORDER — DOXYCYCLINE HYCLATE 100 MG
100 TABLET ORAL 2 TIMES DAILY
Qty: 14 TAB | Refills: 0 | Status: SHIPPED | OUTPATIENT
Start: 2018-02-21 | End: 2018-02-28

## 2018-02-21 RX ORDER — DOCUSATE CALCIUM 240 MG
CAPSULE ORAL
Refills: 2 | COMMUNITY
Start: 2018-02-01 | End: 2022-01-25 | Stop reason: SDUPTHER

## 2018-02-21 ASSESSMENT — ENCOUNTER SYMPTOMS
NAUSEA: 0
ABDOMINAL PAIN: 0
DIAPHORESIS: 1
VOMITING: 0
CHANGE IN BOWEL HABIT: 0
DIARRHEA: 1
HEADACHES: 1
SPUTUM PRODUCTION: 0
COUGH: 1
ANOREXIA: 0
BLOOD IN STOOL: 0
SORE THROAT: 0
SHORTNESS OF BREATH: 0
CHILLS: 1
WHEEZING: 0

## 2018-02-21 NOTE — PROGRESS NOTES
Subjective:      Gena Dykes is a 62 y.o. female who presents with Cough (dry, raspy unproductive cough, congestion x 2 -3 days)            Influenza   This is a new problem. Episode onset: 4 days. The problem occurs daily. The problem has been unchanged. Associated symptoms include chills, congestion, coughing, diaphoresis and headaches. Pertinent negatives include no abdominal pain, anorexia, change in bowel habit, chest pain, nausea, rash, sore throat or vomiting. Nothing aggravates the symptoms. She has tried nothing for the symptoms.       Review of Systems   Constitutional: Positive for chills and diaphoresis.   HENT: Positive for congestion. Negative for ear pain, nosebleeds and sore throat.    Respiratory: Positive for cough. Negative for sputum production, shortness of breath and wheezing.    Cardiovascular: Negative for chest pain.   Gastrointestinal: Positive for diarrhea. Negative for abdominal pain, anorexia, blood in stool, change in bowel habit, nausea and vomiting.   Skin: Negative for rash.   Neurological: Positive for headaches.       PMH:  has a past medical history of Bowel habit changes; Cancer (CMS-HCC) (2013); and Schizophrenia (CMS-HCC).  MEDS:   Current Outpatient Prescriptions:   •  STOOL SOFTENER 240 MG Cap, , Disp: , Rfl: 2  •  ascorbic acid (ASCORBIC ACID) 500 MG Tab, Take 500 mg by mouth every day., Disp: , Rfl:   •  Omega-3 Fatty Acids (FISH OIL CONCENTRATE) 300 MG Cap, Take  by mouth., Disp: , Rfl:   •  polyethylene glycol/lytes (MIRALAX) Pack, Take 17 g by mouth every bedtime., Disp: , Rfl:   •  Calcium Carb-Cholecalciferol (CALCIUM 600 + D PO), Take 1 Tab by mouth 2 Times a Day., Disp: , Rfl:   •  Asenapine Maleate (SAPHRIS) 10 MG SL Tab, Place 10 mg under tongue 2 times a day., Disp: , Rfl:   •  clozapine (CLOZARIL) 100 MG TABS, Take 100 mg by mouth 2 times a day. 200MG in Am and 300MG HS  Indications: two tabs every morning and 4 tabs every bedtime, Disp: , Rfl:   •   "tramadol (ULTRAM) 50 MG Tab, Take 1 Tab by mouth every four hours as needed., Disp: 15 Tab, Rfl: 0  •  ketoconazole (NIZORAL) 2 % Cream, APPLY TOPICALLY TO AFFECTED AREA(S) ONCE DAILY, Disp: 30 g, Rfl: 0  •  docusate sodium (COLACE) 100 MG CAPS, Take 240 mg by mouth 2 times a day., Disp: , Rfl:   •  therapeutic multivitamin-minerals (THERAGRAN-M) TABS, Take 1 Tab by mouth every day., Disp: , Rfl:   ALLERGIES:   Allergies   Allergen Reactions   • Erythromycin      Pt on Clozapine/no erythromycin   • Haldol [Haloperidol Lactate]      Does not tolerate   • Prolixin [Fluphenazine]      hallucinations     SURGHX:   Past Surgical History:   Procedure Laterality Date   • MASS EXCISION GENERAL  7/18/2017    Procedure: ;  Surgeon: Brock Colin M.D.;  Location: SURGERY [facility];  Service:    • LUMPECTOMY Left      SOCHX:  reports that she quit smoking about 6 years ago. Her smoking use included Cigarettes. She has never used smokeless tobacco. She reports that she does not drink alcohol or use drugs.  FH: family history is not on file.     Objective:     /68   Pulse (!) 109   Temp 37.4 °C (99.4 °F)   Resp 12   Ht 1.56 m (5' 1.42\")   Wt 60.3 kg (133 lb)   LMP 07/03/2000 (Approximate)   SpO2 94%   BMI 24.79 kg/m²      Physical Exam   Constitutional: She appears well-developed and well-nourished. She is cooperative.  Non-toxic appearance. No distress.   HENT:   Head: Normocephalic.   Right Ear: No swelling or tenderness. No mastoid tenderness. No decreased hearing is noted.   Left Ear: No swelling or tenderness. No mastoid tenderness. No decreased hearing is noted.   Nose: Mucosal edema and rhinorrhea present. Right sinus exhibits no maxillary sinus tenderness and no frontal sinus tenderness. Left sinus exhibits no maxillary sinus tenderness and no frontal sinus tenderness.   Mouth/Throat: Mucous membranes are normal. No uvula swelling. No oropharyngeal exudate, posterior oropharyngeal edema or " posterior oropharyngeal erythema.   Bilateral partial cerumen obstructions.   Eyes: Conjunctivae and lids are normal.   Neck: Trachea normal and phonation normal. Neck supple. No JVD present.   Cardiovascular: Regular rhythm and normal heart sounds.  Tachycardia present.    No murmur heard.  No significant pedal edema.   Pulmonary/Chest: Effort normal. She has no decreased breath sounds. She has rhonchi in the right middle field and the right lower field. She has no rales.   Abdominal: She exhibits no distension.   Lymphadenopathy:     She has no cervical adenopathy.   Neurological: She is alert. She exhibits normal muscle tone. Coordination normal.   Skin: Skin is warm and dry.   Psychiatric: She has a normal mood and affect.          Patient accompanied by brother.   Signs and symptoms suspect enough for influenza; high risk for complications, will add Tamiflu. Right haziness corresponds with auscultatory findings; will cover for pneumonia   Assessment/Plan:     1. Influenzal pneumonia  Recommended supportive care measures, including rest, increasing oral fluid intake and use of over-the-counter medications for relief of symptoms.  Rx Tamiflu, Ceftin, Zpak  Advised need for PCP follow-up; ED if any worsening condition.  2. Cough  negative- POCT Influenza A/B  - DX-CHEST-2 VIEWS; per radiologist:  Cardiomediastinal contour is within normal limits.  Minimal atherosclerotic calcification of thoracic aorta.  Linear opacities at both lung bases.  No pleural fluid collection or pneumothorax.  Degenerative change of thoracic spine.  Hazy increased density of RIGHT chest is felt due to artifact.

## 2018-03-08 ENCOUNTER — HOSPITAL ENCOUNTER (OUTPATIENT)
Dept: HOSPITAL 8 - ROC | Age: 63
Discharge: HOME | End: 2018-03-08
Attending: RADIOLOGY
Payer: MEDICARE

## 2018-03-08 DIAGNOSIS — Z08: Primary | ICD-10-CM

## 2018-03-08 DIAGNOSIS — F20.0: ICD-10-CM

## 2018-03-08 DIAGNOSIS — Z92.3: ICD-10-CM

## 2018-03-08 DIAGNOSIS — C21.0: ICD-10-CM

## 2018-03-08 DIAGNOSIS — Z85.048: ICD-10-CM

## 2018-03-08 PROCEDURE — G0463 HOSPITAL OUTPT CLINIC VISIT: HCPCS

## 2018-03-21 ENCOUNTER — PATIENT OUTREACH (OUTPATIENT)
Dept: OTHER | Facility: MEDICAL CENTER | Age: 63
End: 2018-03-21

## 2018-03-21 NOTE — PROGRESS NOTES
On 3/21/2018 at 1014 this ONN left a voicemail for patient's caregiver notifying caregiver that this ONN wanted to send a treatment summary to patient. Left call back number.

## 2018-03-23 ENCOUNTER — DOCUMENTATION (OUTPATIENT)
Dept: OTHER | Facility: MEDICAL CENTER | Age: 63
End: 2018-03-23

## 2018-03-23 NOTE — PROGRESS NOTES
On 3/23/2018 this ONN placed in mail for patient Survivorship Care Plan, [employer] [place]'s Facing Forward, and [employer]'s handout Late Term Effects of Treatment.

## 2018-04-16 ENCOUNTER — PATIENT OUTREACH (OUTPATIENT)
Dept: OTHER | Facility: MEDICAL CENTER | Age: 63
End: 2018-04-16

## 2018-06-06 ENCOUNTER — HOSPITAL ENCOUNTER (OUTPATIENT)
Dept: HOSPITAL 8 - ROC | Age: 63
Discharge: HOME | End: 2018-06-06
Attending: RADIOLOGY
Payer: MEDICARE

## 2018-06-06 DIAGNOSIS — F20.0: ICD-10-CM

## 2018-06-06 DIAGNOSIS — C21.1: Primary | ICD-10-CM

## 2018-06-06 DIAGNOSIS — Z85.3: ICD-10-CM

## 2018-06-06 PROCEDURE — G0463 HOSPITAL OUTPT CLINIC VISIT: HCPCS

## 2018-08-15 ENCOUNTER — HOSPITAL ENCOUNTER (OUTPATIENT)
Dept: RADIOLOGY | Facility: MEDICAL CENTER | Age: 63
End: 2018-08-15
Attending: SURGERY
Payer: MEDICARE

## 2018-08-15 DIAGNOSIS — R59.9 ADENOPATHY: ICD-10-CM

## 2018-08-15 DIAGNOSIS — C21.0 ANAL CANCER (HCC): ICD-10-CM

## 2018-08-15 PROCEDURE — A9552 F18 FDG: HCPCS

## 2018-10-02 ENCOUNTER — APPOINTMENT (OUTPATIENT)
Dept: INTERNAL MEDICINE | Facility: MEDICAL CENTER | Age: 63
End: 2018-10-02
Payer: MEDICARE

## 2018-11-05 ENCOUNTER — OFFICE VISIT (OUTPATIENT)
Dept: INTERNAL MEDICINE | Facility: MEDICAL CENTER | Age: 63
End: 2018-11-05
Payer: MEDICARE

## 2018-11-05 VITALS
OXYGEN SATURATION: 94 % | HEART RATE: 85 BPM | TEMPERATURE: 97.4 F | HEIGHT: 61 IN | DIASTOLIC BLOOD PRESSURE: 64 MMHG | WEIGHT: 128 LBS | SYSTOLIC BLOOD PRESSURE: 100 MMHG | BODY MASS INDEX: 24.17 KG/M2

## 2018-11-05 DIAGNOSIS — C21.0 ANAL SQUAMOUS CELL CARCINOMA (HCC): ICD-10-CM

## 2018-11-05 DIAGNOSIS — F20.0 PARANOID SCHIZOPHRENIA (HCC): ICD-10-CM

## 2018-11-05 DIAGNOSIS — D05.92 CARCINOMA IN SITU OF LEFT BREAST, UNSPECIFIED TYPE: ICD-10-CM

## 2018-11-05 DIAGNOSIS — Z12.11 SCREENING FOR MALIGNANT NEOPLASM OF COLON: ICD-10-CM

## 2018-11-05 DIAGNOSIS — E78.00 HYPERCHOLESTEROLEMIA: ICD-10-CM

## 2018-11-05 DIAGNOSIS — Z23 NEED FOR INFLUENZA VACCINATION: ICD-10-CM

## 2018-11-05 DIAGNOSIS — Z76.89 ENCOUNTER TO ESTABLISH CARE: ICD-10-CM

## 2018-11-05 PROCEDURE — G0008 ADMIN INFLUENZA VIRUS VAC: HCPCS | Performed by: INTERNAL MEDICINE

## 2018-11-05 PROCEDURE — 99214 OFFICE O/P EST MOD 30 MIN: CPT | Mod: GC,25 | Performed by: INTERNAL MEDICINE

## 2018-11-05 PROCEDURE — 90686 IIV4 VACC NO PRSV 0.5 ML IM: CPT | Performed by: INTERNAL MEDICINE

## 2018-11-05 RX ORDER — ASENAPINE 10 MG/1
1 TABLET SUBLINGUAL 2 TIMES DAILY
COMMUNITY
End: 2021-10-07

## 2018-11-05 ASSESSMENT — ENCOUNTER SYMPTOMS
DIARRHEA: 0
EYE PAIN: 0
HEMOPTYSIS: 0
SENSORY CHANGE: 0
TINGLING: 0
CONSTIPATION: 0
EYE REDNESS: 0
MEMORY LOSS: 0
BACK PAIN: 0
DIAPHORESIS: 0
HEADACHES: 0
ORTHOPNEA: 0
SPEECH CHANGE: 0
DOUBLE VISION: 0
EYE DISCHARGE: 0
COUGH: 0
MYALGIAS: 0
WHEEZING: 0
NECK PAIN: 0
DIZZINESS: 0
FOCAL WEAKNESS: 0
SEIZURES: 0
INSOMNIA: 0
SHORTNESS OF BREATH: 0
ABDOMINAL PAIN: 0
PHOTOPHOBIA: 0
HALLUCINATIONS: 0
HEARTBURN: 0
SPUTUM PRODUCTION: 0
WEAKNESS: 0
TREMORS: 0
WEIGHT LOSS: 0
CHILLS: 0
BLURRED VISION: 0
PND: 0
SINUS PAIN: 0
PALPITATIONS: 0
NAUSEA: 0
BLOOD IN STOOL: 0
CLAUDICATION: 0
DEPRESSION: 0
VOMITING: 0
SORE THROAT: 0
FEVER: 0
NERVOUS/ANXIOUS: 0

## 2018-11-05 ASSESSMENT — PATIENT HEALTH QUESTIONNAIRE - PHQ9: CLINICAL INTERPRETATION OF PHQ2 SCORE: 0

## 2018-11-05 ASSESSMENT — LIFESTYLE VARIABLES: SUBSTANCE_ABUSE: 0

## 2018-11-05 NOTE — NON-PROVIDER
"Gena Dykes is a 62 y.o. female here for a non-provider visit for:   FLU    Reason for immunization: Annual Flu Vaccine  Immunization records indicate need for vaccine: Yes, confirmed with Epic  Minimum interval has been met for this vaccine: Yes  ABN completed: Not Indicated    Order and dose verified by: [facility]  VIS Dated  08/07/15 was given to patient: Yes  All IAC Questionnaire questions were answered \"No.\"    Patient tolerated injection and no adverse effects were observed or reported: Yes    Pt scheduled for next dose in series: Not Indicated  "

## 2018-11-06 NOTE — PROGRESS NOTES
New Patient to Establish    Reason to establish: Previous PCP no longer in the clinic  CC: Flu Shot, referral to GI    HPI:   Ms. Dykes is a 62 year old post-menopausal female who presents to the clinic accompanied by her caregiver, Radha.    History of Anal Squamous Cell Carcinoma: Patient is being followed by Dr. Colin. She is status post surgery, chemotherapy and radiation since January, 2018. Per chart review, patient responded well to chemoradiation and was found to have stable bilateral lymphadenopathy on CT scan and a PET scan was ordered. Patient stated that her PET scan was found to be unremarkable. She will continue to follow up with Dr. Colin. Patient denies any rectal bleeding, constipation or pain. She was supposed to get a colonoscopy prior to her diagnosis, but she was unable to get so at that time due to rectal mass.     History of Left Breast Cancer: Patient has been in remission for the past five years. She follows with Dr. Cervantes, oncologist, who follows her mammogram. Last one was one year ago which was unremarkable.     Paranoid Schizophrenia on clozapine and asenapine. She follow with psychiatry regularly.     L[facility] in [facility] since 1995 for psychiatric issues.   Smoking: quit 7 years ago. Smoked for 45 years 1 ppd   ETOH: denies   Recreational drugs: denies current use.     Preventive care  Flu - due  TD-2010  Pneumococcal -None  Colonoscopy - Due  Zostavax-n/a    Women only  Pap - 2020  Mammogram - due  Dexa -n/a    Patient Active Problem List    Diagnosis Date Noted   • Anal squamous cell carcinoma (HCC) 09/21/2017   • Bilateral hilar adenopathy syndrome 07/18/2017   • Rash, skin 06/17/2016   • Paranoid schizophrenia (HCC) 04/29/2016   • Carcinoma in situ of breast 04/29/2016   • Hypercholesterolemia 04/29/2016   • Loss of weight 04/29/2016       Past Medical History:   Diagnosis Date   • Bowel habit changes     constipation   • Cancer (HCC) 2013    breast   • Schizophrenia (HCC)         Current Outpatient Prescriptions   Medication Sig Dispense Refill   • Asenapine Maleate 10 MG SL Tab Place 1 Tab under tongue 2 Times a Day.     • STOOL SOFTENER 240 MG Cap   2   • ascorbic acid (ASCORBIC ACID) 500 MG Tab Take 500 mg by mouth every day.     • Omega-3 Fatty Acids (FISH OIL CONCENTRATE) 300 MG Cap Take  by mouth.     • polyethylene glycol/lytes (MIRALAX) Pack Take 17 g by mouth every bedtime.     • Calcium Carb-Cholecalciferol (CALCIUM 600 + D PO) Take 1 Tab by mouth 2 Times a Day.     • clozapine (CLOZARIL) 100 MG TABS Take 100 mg by mouth 2 times a day. 200MG in Am and 300MG HS  Indications: two tabs every morning and 4 tabs every bedtime     • therapeutic multivitamin-minerals (THERAGRAN-M) TABS Take 1 Tab by mouth every day.       No current facility-administered medications for this visit.        Allergies as of 11/05/2018 - Reviewed 11/05/2018   Allergen Reaction Noted   • Erythromycin  06/14/2012   • Haldol [haloperidol lactate]  06/14/2012   • Prolixin [fluphenazine]  04/29/2016       Social History     Social History   • Marital status: Single     Spouse name: N/A   • Number of children: N/A   • Years of education: N/A     Occupational History   • Not on file.     Social History Main Topics   • Smoking status: Former Smoker     Types: Cigarettes     Quit date: 12/12/2011   • Smokeless tobacco: Never Used   • Alcohol use No   • Drug use: No   • Sexual activity: Not on file     Other Topics Concern   • Not on file     Social History Narrative   • No narrative on file       Family History   Problem Relation Age of Onset   • Heart Disease Mother        Past Surgical History:   Procedure Laterality Date   • MASS EXCISION GENERAL  7/18/2017    Procedure: ;  Surgeon: Brock Colin M.D.;  Location: SURGERY [facility];  Service:    • LUMPECTOMY Left        ROS:   Review of Systems   Constitutional: Negative for chills, diaphoresis, fever, malaise/fatigue and weight loss.   HENT:  "Negative for congestion, ear discharge, ear pain, hearing loss, sinus pain, sore throat and tinnitus.    Eyes: Negative for blurred vision, double vision, photophobia, pain, discharge and redness.   Respiratory: Negative for cough, hemoptysis, sputum production, shortness of breath and wheezing.    Cardiovascular: Negative for chest pain, palpitations, orthopnea, claudication, leg swelling and PND.   Gastrointestinal: Negative for abdominal pain, blood in stool, constipation, diarrhea, heartburn, melena, nausea and vomiting.   Genitourinary: Negative for dysuria, frequency, hematuria and urgency.   Musculoskeletal: Negative for back pain, joint pain, myalgias and neck pain.   Skin: Negative for itching and rash.   Neurological: Negative for dizziness, tingling, tremors, sensory change, speech change, focal weakness, seizures, weakness and headaches.   Psychiatric/Behavioral: Negative for depression, hallucinations, memory loss, substance abuse and suicidal ideas. The patient is not nervous/anxious and does not have insomnia.        /64 (BP Location: Right arm, Patient Position: Sitting, BP Cuff Size: Adult)   Pulse 85   Temp 36.3 °C (97.4 °F) (Temporal)   Ht 1.56 m (5' 1.42\")   Wt 58.1 kg (128 lb)   LMP 07/03/2000 (Approximate)   SpO2 94%   BMI 23.86 kg/m²     Physical Exam  General:   female sitting in bed in no acute distress.     Eyes: Pupils equal and reactive. No scleral icterus. EOMI    Throat: Clear no erythema or exudates noted.    Neck: Supple. No lymphadenopathy noted. Thyroid not enlarged.    Lungs: Clear to auscultation bilaterally.    Cardiovascular: Regular rate and rhythm. No murmurs, rubs or gallops.    Abdomen:  Rotunded, soft, non-tender. No rebound or guarding noted.    Extremities: No clubbing, cyanosis, edema.    Skin: Clear. No rash or suspicious skin lesions noted.    Back: Intact range of motion, no CVA tenderness       Assessment and Plan    1. Encounter to establish " care  - Check CBC, CMP, TSH     2. Need for influenza vaccination  - Flu shot administered today     3. Screening for malignant neoplasm of colon  - GI referral for colonoscopy     4. Hypercholesterolemia  - Check Lipid profile     5. Paranoid schizophrenia (HCC)  - Follows with Psychiatry; follow up as scheduled     6. Anal squamous cell carcinoma (HCC)  - Currently stable   - Follows up with Dr. Colin; follow up as scheduled    7. Carcinoma in situ of left breast, unspecified type  - Follows up with Dr. Cervantes; follow up as scheduled           Signed by: Kadie Rucker D.O.

## 2018-12-04 LAB
ALBUMIN SERPL-MCNC: 4.1 G/DL (ref 3.6–4.8)
ALBUMIN/GLOB SERPL: 1.5 {RATIO} (ref 1.2–2.2)
ALP SERPL-CCNC: 89 IU/L (ref 39–117)
ALT SERPL-CCNC: 11 IU/L (ref 0–32)
AST SERPL-CCNC: 22 IU/L (ref 0–40)
BILIRUB SERPL-MCNC: <0.2 MG/DL (ref 0–1.2)
BUN SERPL-MCNC: 15 MG/DL (ref 8–27)
BUN/CREAT SERPL: 17 (ref 12–28)
CALCIUM SERPL-MCNC: 9.2 MG/DL (ref 8.7–10.3)
CHLORIDE SERPL-SCNC: 98 MMOL/L (ref 96–106)
CHOLEST SERPL-MCNC: 210 MG/DL (ref 100–199)
CO2 SERPL-SCNC: 26 MMOL/L (ref 20–29)
CREAT SERPL-MCNC: 0.86 MG/DL (ref 0.57–1)
GLOBULIN SER CALC-MCNC: 2.7 G/DL (ref 1.5–4.5)
GLUCOSE SERPL-MCNC: 90 MG/DL (ref 65–99)
HDLC SERPL-MCNC: 66 MG/DL
IF AFRICAN AMERICAN  100797: 84 ML/MIN/1.73
IF NON AFRICAN AMER 100791: 73 ML/MIN/1.73
LABORATORY COMMENT REPORT: ABNORMAL
LDLC SERPL CALC-MCNC: 120 MG/DL (ref 0–99)
POTASSIUM SERPL-SCNC: 4.8 MMOL/L (ref 3.5–5.2)
PROT SERPL-MCNC: 6.8 G/DL (ref 6–8.5)
SODIUM SERPL-SCNC: 137 MMOL/L (ref 134–144)
TRIGL SERPL-MCNC: 122 MG/DL (ref 0–149)
TSH SERPL DL<=0.005 MIU/L-ACNC: 2.58 UIU/ML (ref 0.45–4.5)
VLDLC SERPL CALC-MCNC: 24 MG/DL (ref 5–40)

## 2020-05-29 ENCOUNTER — NON-PROVIDER VISIT (OUTPATIENT)
Dept: URGENT CARE | Facility: PHYSICIAN GROUP | Age: 65
End: 2020-05-29

## 2020-05-29 DIAGNOSIS — Z11.1 SCREENING FOR TUBERCULOSIS: Primary | ICD-10-CM

## 2020-05-29 PROCEDURE — 86580 TB INTRADERMAL TEST: CPT | Performed by: FAMILY MEDICINE

## 2020-05-29 NOTE — PROGRESS NOTES
Gena Dykes is a 64 y.o. female here for a non-provider visit for PPD placement -- Step 1 of 1    Reason for PPD:  work requirement    1. TB evaluation questionnaire completed by patient? Yes      -  If any answers marked yes did you contact a provider prior to placing? No  2.  Patient notified to return to clinic for reading on: 05/31/2020  3.  PPD Placement documentation completed on TB evaluation questionnaire? Yes  4.  Location of TB evaluation questionnaire filed: [facility]

## 2020-05-31 ENCOUNTER — NON-PROVIDER VISIT (OUTPATIENT)
Dept: URGENT CARE | Facility: PHYSICIAN GROUP | Age: 65
End: 2020-05-31

## 2020-05-31 LAB — TB WHEAL 3D P 5 TU DIAM: 0 MM

## 2020-05-31 NOTE — PROGRESS NOTES
Gena Dykes is a 64 y.o. female here for a non-provider visit for PPD reading -- Step 1 of 1.      1.  Resulted in Epic under enter/edit results? Yes   2.  TB evaluation questionnaire scanned into chart and original given to patient?Yes      3. Was induration greater than 0 mm? No.      Routed to PCP? No

## 2020-09-03 NOTE — PROGRESS NOTES
"Seema Johnson is a 67 year old female who is being evaluated via a billable video visit.      The patient has been notified of following:     \"This video visit will be conducted via a call between you and your physician/provider. We have found that certain health care needs can be provided without the need for an in-person physical exam.  This service lets us provide the care you need with a video conversation.  If a prescription is necessary we can send it directly to your pharmacy.  If lab work is needed we can place an order for that and you can then stop by our lab to have the test done at a later time.    Video visits are billed at different rates depending on your insurance coverage.  Please reach out to your insurance provider with any questions.    If during the course of the call the physician/provider feels a video visit is not appropriate, you will not be charged for this service.\"    Patient has given verbal consent for Video visit? Yes  How would you like to obtain your AVS? Mail a copy  If you are dropped from the video visit, the video invite should be resent to: Send to e-mail at: [email or web address]  Will anyone else be joining your video visit? No      Subjective     Seema Johnson is a 67 year old female who presents today via video visit for the following health issues:    HPI     Health Maintenance:  Flu shot given today at [facility].  Will be due for [id number] after 01/30/2021   Mammogram Oct 2020 - per   Dexa has been done - waiting on fax from outside imaging location   Colonoscopy- due this year. On 5 yr schedule due to fam hx colon cancer.  ---    Feeling well. Doing well with Covid Stay Home orders.  No illnesses or injuries. Chronic conditions have been stable. No vertigo at all. Tremor has been ok since dose increase of propranolol last year.   Has large yard and outdoor space. Walking and gardening.   Gaining a little weight.   Appetite has been " Chemotherapy Verification - PRIMARY RN      Height = 61.42 in  Weight = 56.3 kg  BSA = 1.56 m2       Medication: Mitomycin  Dose: 10 mg/m2  Calculated Dose: 15.6 mg                             (In mg/m2, AUC, mg/kg)     Medication: 5 FU pump  Dose: 4000 mg/m2 over 96 hours  Calculated Dose: 6240 mg over 96 hours                             (In mg/m2, AUC, mg/kg)    I confirm this process was performed independently with the BSA and all final chemotherapy dosing calculations congruent.  Any discrepancies of 5% or greater have been addressed with the chemotherapy pharmacist. The resolution of the discrepancy has been documented in the EPIC progress notes.        good.  Energy has been good.      Back has been ok.   Hx of compression fx  October annually.   Has IV reclast scheduled Oct 2020.     Hoping to go to [place] - for 6months- leaving Oct 4, 2020. Need meds for 6 months.      Seeing , oncologist end of Sept.     Hyperlipidemia Follow-Up- on her statin    Are you regularly taking any medication or supplement to lower your cholesterol?   Yes- statin    Are you having muscle aches or other side effects that you think could be caused by your cholesterol lowering medication?  No      How many servings of fruits and vegetables do you eat daily?  4 or more    On average, how many sweetened beverages do you drink each day (Examples: soda, juice, sweet tea, etc.  Do NOT count diet or artificially sweetened beverages)?   0    How many days per week do you exercise enough to make your heart beat faster? 5    How many minutes a day do you exercise enough to make your heart beat faster? 60 or more    How many days per week do you miss taking your medication? 0      Video Start Time: 9:48am      Review of Systems   Constitutional, HEENT, cardiovascular, pulmonary, gi and gu systems are negative, except as otherwise noted.      Objective           Vitals:  No vitals were obtained today due to virtual visit.    Physical Exam     GENERAL: Healthy, alert and no distress  EYES: Eyes grossly normal to inspection.  No discharge or erythema, or obvious scleral/conjunctival abnormalities.  HENT: Normal cephalic/atraumatic.  External ears, nose and mouth without ulcers or lesions.  No nasal drainage visible.  RESP: No audible wheeze, cough, or visible cyanosis.  No visible retractions or increased work of breathing.    SKIN: Visible skin clear. No significant rash, abnormal pigmentation or lesions.  NEURO: Cranial nerves grossly intact.  Mentation and speech appropriate for age.  PSYCH: Mentation appears normal, affect normal/bright, judgement and insight intact, normal speech and  appearance well-groomed.            Assessment & Plan     Hyperlipidemia LDL goal <100  Will do fasting labs when she has labs with oncology.   Refilled statin, tolerated and has been controlled on current dose.  - Lipid panel reflex to direct LDL Fasting; Future  - simvastatin (ZOCOR) 20 MG tablet; Take 1 tablet (20 mg) by mouth At Bedtime    Special screening for malignant neoplasms, colon  Due for colonoscopy. Will try to do prior to trip to [place] in Oct 2020  - GASTROENTEROLOGY ADULT REF PROCEDURE ONLY; Future    History of compression fracture of spine  Continue w/osteoporosis treatment, wt bearing, calcium and vit D.     Benign essential tremor  Sx stable. Refilled.   - propranolol ER (INDERAL LA) 60 MG 24 hr capsule; Take 1 capsule (60 mg) by mouth daily    Osteoporosis, unspecified osteoporosis type, unspecified pathological fracture presence  Will await fax from outside imaging.   Seeing rheumatology for follow up and IV reclast.     Vertigo  No recent symptoms    Follow Up: The patient was instructed to contact clinic for worsening symptoms, non-improvement as expected/discussed, and for questions regarding medications or treatment plan. Discussed parameters for follow up and included in After Visit Summary given to patient.      Return in about 6 months (around 3/3/2021).    Shantel Plaza PA-C  [facility]      Video-Visit Details    Type of service:  Video Visit    Video End Time:10:03 AM    Originating Location (pt. Location): Home    Distant Location (provider location):  [facility]     Platform used for Video Visit: Lizbeth

## 2021-02-16 ENCOUNTER — APPOINTMENT (OUTPATIENT)
Dept: RADIOLOGY | Facility: MEDICAL CENTER | Age: 66
End: 2021-02-16
Attending: EMERGENCY MEDICINE
Payer: MEDICARE

## 2021-02-16 ENCOUNTER — HOSPITAL ENCOUNTER (EMERGENCY)
Facility: MEDICAL CENTER | Age: 66
End: 2021-02-16
Attending: EMERGENCY MEDICINE
Payer: MEDICARE

## 2021-02-16 VITALS
SYSTOLIC BLOOD PRESSURE: 101 MMHG | RESPIRATION RATE: 16 BRPM | DIASTOLIC BLOOD PRESSURE: 75 MMHG | OXYGEN SATURATION: 96 % | HEIGHT: 63 IN | HEART RATE: 85 BPM | BODY MASS INDEX: 24.96 KG/M2 | TEMPERATURE: 97.2 F | WEIGHT: 140.87 LBS

## 2021-02-16 DIAGNOSIS — M25.552 HIP PAIN, LEFT: ICD-10-CM

## 2021-02-16 DIAGNOSIS — M25.562 ACUTE PAIN OF LEFT KNEE: ICD-10-CM

## 2021-02-16 PROCEDURE — 73501 X-RAY EXAM HIP UNI 1 VIEW: CPT | Mod: LT

## 2021-02-16 PROCEDURE — 99283 EMERGENCY DEPT VISIT LOW MDM: CPT

## 2021-02-16 PROCEDURE — 73564 X-RAY EXAM KNEE 4 OR MORE: CPT | Mod: LT

## 2021-02-16 ASSESSMENT — ENCOUNTER SYMPTOMS
HEADACHES: 0
FALLS: 0
FEVER: 0
NECK PAIN: 0
SHORTNESS OF BREATH: 0
VOMITING: 0
BACK PAIN: 0
DIARRHEA: 0

## 2021-02-16 NOTE — ED PROVIDER NOTES
"ED Provider Note    ED Provider Note    Primary care provider: Kadie Rucker D.O.  Means of arrival: POV  History obtained from: patient  History limited by: None    CHIEF COMPLAINT  Chief Complaint   Patient presents with   • Leg Pain     \"I think I walked on it too much over the years\", L leg pain x 1 week, on lateral leg behind the knee.  \"It feels like a asim horse from walking too much\".  CMS intact.       HPI  Gena Dykes is a 65 y.o. female who presents to the Emergency Department with a chief complaint of left knee pain and left hip pain.  She states her hip feels better now but she still having pain to the left lateral knee and just below the left lateral aspect of the knee.  She denies any recent trauma or falls.  She thinks she is just walks too much on it and it has caused pain.  She has had symptoms for \"at least a week\".  She denies any fever cough or cold symptoms.  No vomiting or diarrhea.  She is otherwise been in her normal state of health.    REVIEW OF SYSTEMS  Review of Systems   Constitutional: Negative for fever.   HENT: Negative for congestion.    Respiratory: Negative for shortness of breath.    Gastrointestinal: Negative for diarrhea and vomiting.   Musculoskeletal: Positive for joint pain. Negative for back pain, falls and neck pain.   Neurological: Negative for headaches.       PAST MEDICAL HISTORY   has a past medical history of Bowel habit changes, Cancer ([facility]) (2013), and Schizophrenia ([facility]).    SURGICAL HISTORY   has a past surgical history that includes lumpectomy (Left) and mass excision general (2017).    SOCIAL HISTORY  Social History     Tobacco Use   • Smoking status: Former Smoker     Types: Cigarettes     Quit date: 2011     Years since quittin.1   • Smokeless tobacco: Never Used   • Tobacco comment: smoked for 45 years   Substance Use Topics   • Alcohol use: No     Alcohol/week: 0.0 oz   • Drug use: No      Social History     Substance and Sexual " "Activity   Drug Use No       FAMILY HISTORY  Family History   Problem Relation Age of Onset   • Heart Disease Mother        CURRENT MEDICATIONS  Home Medications    **Home medications have not yet been reviewed for this encounter**         ALLERGIES  Allergies   Allergen Reactions   • Erythromycin      Pt on Clozapine/no erythromycin   • Haldol [Haloperidol Lactate]      Does not tolerate   • Prolixin [Fluphenazine]      hallucinations       PHYSICAL EXAM  VITAL SIGNS: /75   Pulse 85   Temp 36.2 °C (97.2 °F) (Temporal)   Resp 16   Ht 1.6 m (5' 3\")   Wt 63.9 kg (140 lb 14 oz)   LMP 07/03/2000 (Approximate)   SpO2 96%   BMI 24.95 kg/m²   Vitals reviewed.  Constitutional: Patient is oriented to person, place, and time. Appears well-developed and well-nourished. No distress.    Head: Normocephalic and atraumatic.   Ears: Normal external ears bilaterally.   Mouth/Throat: Mask in place  Eyes: Conjunctivae are normal. Pupils are equal, round, and reactive to light.   Neck: Normal range of motion. Neck supple.  Cardiovascular: Normal rate, regular rhythm and normal heart sounds. Normal peripheral pulses, bilateral lower extremity.  Pulmonary/Chest: Effort normal and breath sounds normal. No respiratory distress, no wheezes, rhonchi, or rales.   Abdominal: Soft. Bowel sounds are normal. There is no tenderness.   Musculoskeletal: No edema.  There is normal range of motion of the bilateral hips that is smooth.  No shortening or rotation noted to the lower extremities.  There is mild swelling without overlying skin changes or discoloration, to the lateral aspect of the left knee and just distal to the left knee, laterally.  No calf tenderness.  Normal distal pulses.  Normal range of motion of bilateral knees.  There is pain with lateral stress on the left side.  Negative anterior drawer testing bilaterally.   Neurological: No focal deficits.   Skin: Skin is warm and dry. No erythema. No pallor.   Psychiatric: " Patient has a normal mood and affect.     RADIOLOGY  DX-HIP-UNILATERAL-WITH PELVIS-1 VIEW LEFT   Final Result      No evidence of fracture or arthropathy.      DX-KNEE COMPLETE 4+ LEFT   Final Result      No evidence of acute fracture or dislocation.        The radiologist's interpretation of all radiological studies have been reviewed by me.    COURSE & MEDICAL DECISION MAKING  Pertinent Labs & Imaging studies reviewed. (See chart for details)    Obtained and reviewed past medical records.  Patient's last encounter was an outpatient visit with her PCP November 2018.  Patient was seen for a general exam and to establish care.    11:38 AM - Patient seen and examined at bedside.  This is a pleasant 65-year-old female who presents with nontraumatic left knee pain and left hip pain.  She is not actually experiencing any left hip pain at this time but she was.  She has normal range of motion of these joints.  No fever.  No overlying skin changes, or findings suggestive of infectious etiology.  I suspect is likely arthritis however, other bony abnormalities would be within the differential.    She is reevaluated at the bedside.  She is resting, reading a book.  She is in no distress.  We discussed x-ray findings which show no evidence of fracture or effusion.  I suspect, this is likely related to increased use.  We discussed resting, icing the area and taking Tylenol or ibuprofen.  Patient is agreeable to this plan of care.  She is given an opportunity for questions.      She is discharged home in stable condition with strict return precautions.    FINAL IMPRESSION  1. Acute pain of left knee    2. Hip pain, left

## 2021-02-16 NOTE — ED NOTES
Pt placed in stretcher, able to ambulate but preferred wheelchair, pt c/o pain to the left leg that radiates up the lateral side from the ankle to the knee. Sometimes its a 0/10, other times it is a 6/10 in pain. No other complaints at this time. Pulse motor sensory intact in LLE at this time.

## 2021-02-16 NOTE — ED NOTES
Spoke with patients caregiver zeny about patients discharge instructions, no further questions at this time.   Zeny enroute to  patient, states she would just like pt to be ready and waiting in the waiting room.     Patient understands discharge instructions. Given all DC education, prescriptions, f/u appointments. Pt verbalized understanding.  In no distress. IV and telemetry dc'd. Has all belongings.  Ambulating well with steady gait. Will return for worsening symptoms.

## 2021-02-16 NOTE — ED TRIAGE NOTES
"Gena Dykes 65 y.o. female ambulatory to triage with steady gait for     Chief Complaint   Patient presents with   • Leg Pain     \"I think I walked on it too much over the years\", L leg pain x 1 week, on lateral leg behind the knee.  \"It feels like a asim horse from walking too much\".  CMS intact.     /75   Pulse (!) 107   Temp 36.2 °C (97.2 °F) (Temporal)   Resp 16   Ht 1.6 m (5' 3\")   Wt 63.9 kg (140 lb 14 oz)   LMP 07/03/2000 (Approximate)   SpO2 96%   BMI 24.95 kg/m²   Pt returned to the lobby to await bed assignment.  Advised to return to the triage desk for any changes/concerns.  "

## 2021-03-15 DIAGNOSIS — Z23 NEED FOR VACCINATION: ICD-10-CM

## 2021-05-25 ENCOUNTER — NON-PROVIDER VISIT (OUTPATIENT)
Dept: OCCUPATIONAL MEDICINE | Facility: CLINIC | Age: 66
End: 2021-05-25

## 2021-05-25 DIAGNOSIS — Z11.1 ENCOUNTER FOR PPD TEST: Primary | ICD-10-CM

## 2021-05-25 PROCEDURE — 86580 TB INTRADERMAL TEST: CPT | Performed by: NURSE PRACTITIONER

## 2021-05-27 ENCOUNTER — NON-PROVIDER VISIT (OUTPATIENT)
Dept: OCCUPATIONAL MEDICINE | Facility: CLINIC | Age: 66
End: 2021-05-27

## 2021-05-27 DIAGNOSIS — Z11.1 ENCOUNTER FOR PPD SKIN TEST READING: ICD-10-CM

## 2021-05-27 LAB — TB WHEAL 3D P 5 TU DIAM: NORMAL MM

## 2021-10-07 ENCOUNTER — OFFICE VISIT (OUTPATIENT)
Dept: INTERNAL MEDICINE | Facility: OTHER | Age: 66
End: 2021-10-07
Payer: MEDICARE

## 2021-10-07 VITALS
DIASTOLIC BLOOD PRESSURE: 64 MMHG | HEART RATE: 105 BPM | SYSTOLIC BLOOD PRESSURE: 116 MMHG | WEIGHT: 150.6 LBS | OXYGEN SATURATION: 95 % | BODY MASS INDEX: 26.68 KG/M2 | HEIGHT: 63 IN | TEMPERATURE: 97.2 F

## 2021-10-07 DIAGNOSIS — F20.0 PARANOID SCHIZOPHRENIA (HCC): ICD-10-CM

## 2021-10-07 DIAGNOSIS — Z00.00 PREVENTATIVE HEALTH CARE: ICD-10-CM

## 2021-10-07 DIAGNOSIS — Z12.31 SCREENING MAMMOGRAM FOR BREAST CANCER: ICD-10-CM

## 2021-10-07 DIAGNOSIS — K59.00 CONSTIPATION, UNSPECIFIED CONSTIPATION TYPE: ICD-10-CM

## 2021-10-07 DIAGNOSIS — Z13.228 SCREENING FOR METABOLIC DISORDER: ICD-10-CM

## 2021-10-07 PROCEDURE — 99213 OFFICE O/P EST LOW 20 MIN: CPT | Mod: GE | Performed by: HOSPITALIST

## 2021-10-07 RX ORDER — ASENAPINE 10 MG/1
1 TABLET SUBLINGUAL 2 TIMES DAILY
Qty: 60 TABLET | Refills: 0 | Status: SHIPPED | OUTPATIENT
Start: 2021-10-07 | End: 2021-10-07 | Stop reason: CLARIF

## 2021-10-07 RX ORDER — ASENAPINE 10 MG/1
1 TABLET SUBLINGUAL 2 TIMES DAILY
COMMUNITY

## 2021-10-07 ASSESSMENT — ENCOUNTER SYMPTOMS
DEPRESSION: 0
CONSTIPATION: 1
DIAPHORESIS: 0
WEIGHT LOSS: 0
COUGH: 0
VOMITING: 0
CHILLS: 0
MYALGIAS: 0
WEAKNESS: 0
BLOOD IN STOOL: 0
ABDOMINAL PAIN: 0
SHORTNESS OF BREATH: 0
FEVER: 0
NAUSEA: 0

## 2021-10-07 NOTE — PROGRESS NOTES
"Date of Service:  10/7/2021    CC: 6 month f/u     HPI:  Gena Dykes  is a 65 y.o. female with a PMH of paranoid schizophrenia (followed by RE[facility] Moyer from [facility] with monthly CBC bloodwork, previously following with [facility]), Anal SCC s/p surgery and chemoradiotherapy, hx of left breast cancer s/p surgical excision, hyperlipidemia presents today for 6 month followup. Patient is here with her group home caregiver Radha today.  Patient states she is doing well, and her schizophrenia/mood is well controlled with her current medication regimen.  Patient states that she intermittently feels warm denies any fever, chills, abdominal pain, shortness of breath, chest pain, weight loss. Radha states that this is the first time she is hearing such complaints, Radha states she will have patient notify her when these episodes occur and write down her symptoms. Patient has history of chronic constipation, currently taking daily docusate and miralax. Radha mentioned about possibility of starting pt on probiotics that she saw on tv that claims will help \"cleanse\" her bowel. She is not having daily BM, but she does keep a log, her constipation has been at baseline and denies it being more than normal. Patient states she drinks lots of water in addition to her medications for constipations which normally helps her have a BM, but if this all fails, Radha does give patient some prune juice which usually works great for her. Denies any abdominal pain, nausea, vomiting, hematemesis, hematochezia, melena.      Review of systems:  Review of Systems   Constitutional: Negative for chills, diaphoresis, fever and weight loss.   Respiratory: Negative for cough and shortness of breath.    Cardiovascular: Negative for chest pain.   Gastrointestinal: Positive for constipation. Negative for abdominal pain, blood in stool, melena, nausea and vomiting.   Genitourinary: Negative for dysuria.   Musculoskeletal: Negative for " myalgias.   Skin: Negative for itching and rash.   Neurological: Negative for weakness.   Psychiatric/Behavioral: Negative for depression and suicidal ideas.        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    Medications:  Current Outpatient Medications   Medication Sig Dispense Refill   • Asenapine Maleate 10 MG SL Tab Place  under the tongue. Place 1 tab under tongue twice a day     • STOOL SOFTENER 240 MG Cap   2   • Omega-3 Fatty Acids (FISH OIL CONCENTRATE) 300 MG Cap Take  by mouth.     • polyethylene glycol/lytes (MIRALAX) Pack Take 17 g by mouth every bedtime.     • Calcium Carb-Cholecalciferol (CALCIUM 600 + D PO) Take 1 Tab by mouth 2 Times a Day.     • clozapine (CLOZARIL) 100 MG TABS Take 100 mg by mouth 2 times a day. 200MG in Am and 300MG HS  Indications: two tabs every morning and 4 tabs every bedtime     • therapeutic multivitamin-minerals (THERAGRAN-M) TABS Take 1 Tab by mouth every day.       No current facility-administered medications for this visit.       Allergies:  Allergies   Allergen Reactions   • Azithromycin    • Fluphenazine      hallucinations   • Haldol [Haloperidol]    • Erythromycin      Pt on Clozapine/no erythromycin   • Haldol [Haloperidol Lactate]      Does not tolerate       Family History:   family history includes Heart Disease in her mother.     Social History:    Social History     Tobacco Use   • Smoking status: Former Smoker     Types: Cigarettes     Quit date: 2011     Years since quittin.8   • Smokeless tobacco:  Never Used   • Tobacco comment: smoked for 45 years   Substance Use Topics   • Alcohol use: No     Alcohol/week: 0.0 oz   • Drug use: No     Living situation: lives at group home    Physical Exam:  Vitals:    10/07/21 0823   BP: 116/64   Pulse: (!) 105   Temp: 36.2 °C (97.2 °F)   SpO2: 95%     Body mass index is 26.68 kg/m².  Physical Exam  Constitutional:       General: She is not in acute distress.     Appearance: Normal appearance. She is not ill-appearing.   HENT:      Head: Normocephalic and atraumatic.   Eyes:      General: No scleral icterus.     Extraocular Movements: Extraocular movements intact.      Conjunctiva/sclera: Conjunctivae normal.   Cardiovascular:      Rate and Rhythm: Normal rate.      Pulses: Normal pulses.      Heart sounds: Normal heart sounds. No murmur heard.   No friction rub. No gallop.    Pulmonary:      Effort: Pulmonary effort is normal. No respiratory distress.      Breath sounds: Normal breath sounds. No wheezing, rhonchi or rales.   Abdominal:      General: Bowel sounds are normal.      Palpations: Abdomen is soft.      Tenderness: There is no abdominal tenderness.   Skin:     General: Skin is warm and dry.   Neurological:      General: No focal deficit present.      Mental Status: She is alert.   Psychiatric:         Mood and Affect: Mood normal.         Behavior: Behavior normal.        Assessment/Plan:  Constipation  Currently at baseline with daily miralax and docusate, and plenty of water intake. Per pt's caregiver, if all fails, she gives pt prune juice which usually resolves patient's constipation.    Plan:   - Discussed constipation likely secondary to pt's use of clozapine which can cause medication induced constipation, however, will order TSH/T4 to assess if there is hypothyroidism component to her constipation  - At caregivers request, discussed about probiotics, but since patient is at her baseline, will hold off on ordering any probiotics. Informed patient and her  cargiver that if they would like to try probiotics they certainly can as it is also available over the counter. Patient and caregiver agreed and verbalized understanding.     Preventative health care  COVID vaccine x 2  Will receive Flu shot sometime in November, per Radha (pt's caregiver)    Plan:  - Patient is due for PAP smear (last one 05/2017), will perform pap smear at next followup visit in 10 weeks  - Ordered and printed Mammogram order to be done in January 2022 (last mammogram 01/23/2021 showing no mammographic evidence of malignancy)   - Ordered lipid panel and CMP to screen for metabolic disorder  - Will discuss updating Tdap (last one in 2010), and Zoster vaccines at next visit    Paranoid schizophrenia  At baseline, symptoms controlled with current medication regimen of Clozapine and Asenapine, which is being managed by psychiatry through [facility] ([facility] [place]) and medication use monitored with monthly CBC to monitor for neutropenia.     Plan:   - Continue current home meds       All imaging results and lab results and consult notes are reviewed at this visit.  Followup: Return in about 10 weeks (around 12/16/2021).    Carito Zamora, DO  Internal Medicine PGY-1

## 2021-10-07 NOTE — ASSESSMENT & PLAN NOTE
At baseline, symptoms controlled with current medication regimen of Clozapine and Asenapine, which is being managed by psychiatry through [facility] ([facility]) and medication use monitored with monthly CBC to monitor for neutropenia.     Plan:   - Continue current home meds

## 2021-10-07 NOTE — ASSESSMENT & PLAN NOTE
Currently at baseline with daily miralax and docusate, and plenty of water intake. Per pt's caregiver, if all fails, she gives pt prune juice which usually resolves patient's constipation.    Plan:   - Discussed constipation likely secondary to pt's use of clozapine which can cause medication induced constipation, however, will order TSH/T4 to assess if there is hypothyroidism component to her constipation  - At caregivers request, discussed about probiotics, but since patient is at her baseline, will hold off on ordering any probiotics. Informed patient and her cargiver that if they would like to try probiotics they certainly can as it is also available over the counter. Patient and caregiver agreed and verbalized understanding.

## 2021-10-07 NOTE — ASSESSMENT & PLAN NOTE
COVID vaccine x 2  Will receive Flu shot sometime in November, per Radha (pt's caregiver)    Plan:  - Patient is due for PAP smear (last one 05/2017), will perform pap smear at next followup visit in 10 weeks  - Ordered and printed Mammogram order to be done in January 2022 (last mammogram 01/23/2021 showing no mammographic evidence of malignancy)   - Ordered lipid panel and CMP to screen for metabolic disorder  - Will discuss updating Tdap (last one in 2010), and Zoster vaccines at next visit

## 2021-12-16 ENCOUNTER — OFFICE VISIT (OUTPATIENT)
Dept: INTERNAL MEDICINE | Facility: OTHER | Age: 66
End: 2021-12-16
Payer: MEDICARE

## 2021-12-16 VITALS
DIASTOLIC BLOOD PRESSURE: 79 MMHG | OXYGEN SATURATION: 97 % | HEART RATE: 116 BPM | BODY MASS INDEX: 27.89 KG/M2 | TEMPERATURE: 96.6 F | WEIGHT: 157.4 LBS | SYSTOLIC BLOOD PRESSURE: 125 MMHG | HEIGHT: 63 IN

## 2021-12-16 DIAGNOSIS — K59.00 CONSTIPATION, UNSPECIFIED CONSTIPATION TYPE: ICD-10-CM

## 2021-12-16 DIAGNOSIS — Z00.00 PREVENTATIVE HEALTH CARE: ICD-10-CM

## 2021-12-16 DIAGNOSIS — Z23 FLU VACCINE NEED: ICD-10-CM

## 2021-12-16 DIAGNOSIS — F20.0 PARANOID SCHIZOPHRENIA (HCC): ICD-10-CM

## 2021-12-16 PROCEDURE — 90686 IIV4 VACC NO PRSV 0.5 ML IM: CPT | Performed by: INTERNAL MEDICINE

## 2021-12-16 PROCEDURE — 99213 OFFICE O/P EST LOW 20 MIN: CPT | Mod: GC

## 2021-12-16 PROCEDURE — G0008 ADMIN INFLUENZA VIRUS VAC: HCPCS | Performed by: INTERNAL MEDICINE

## 2021-12-16 ASSESSMENT — FIBROSIS 4 INDEX: FIB4 SCORE: 1.89

## 2021-12-16 NOTE — PATIENT INSTRUCTIONS
Can increase miralax dose to two caps a day for constipation if needed.     Will plan for Pap smear at next visit.

## 2021-12-16 NOTE — PROGRESS NOTES
Date of Service:   12/16/21    CC: 10 week followup    HPI:  Gena Dykes is a 65 y.o. female with a PMH of paranoid schizophrenia (followed by REINIER Moyer from [facility] with monthly CBC bloodwork, previously following with [facility]), Anal SCC s/p surgery and chemoradiotherapy, hx of left breast cancer s/p surgical excision, hyperlipidemia presents today for followup on bloodwork and pap smear. Patient is here today with her group home caregiver, Radha. Patient states she did not know that she was getting a pap smear this visit and would like to defer this to the next visit so she can be more prepared.   Caregive states she is doing well, pt has no complaints today. She will be getting her covid booster later this month in December. Patient gets a bowel movement now every 2-3 days, which per the caregiver is a lot better than before. Currently taking stool softener and prune juice if needed. Patient states she is walking more and caregiver thinks this increase in physical activity has been helping with pt's constipation. Denies any abdominal pain currently, nausea, vomiting, fever, chills, CP, or SOB. Patient is doing well, and her schizophrenia/mood is well controlled with her current medication regimen.     Review of systems:  Review of Systems   Constitutional: Negative for chills, fever and malaise/fatigue.   HENT: Negative.    Eyes: Negative.    Respiratory: Negative for cough, sputum production and shortness of breath.    Cardiovascular: Negative for chest pain, palpitations and leg swelling.   Gastrointestinal: Positive for constipation. Negative for abdominal pain, diarrhea, nausea and vomiting.   Genitourinary: Negative.    Musculoskeletal: Negative.    Skin: Negative for itching and rash.   Neurological: Negative for dizziness and headaches.     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    Medications:  Current Outpatient Medications   Medication Sig Dispense Refill   • Asenapine Maleate 10 MG SL Tab Place  under the tongue. Place 1 tab under tongue twice a day     • STOOL SOFTENER 240 MG Cap   2   • Omega-3 Fatty Acids (FISH OIL CONCENTRATE) 300 MG Cap Take  by mouth.     • polyethylene glycol/lytes (MIRALAX) Pack Take 17 g by mouth every bedtime.     • Calcium Carb-Cholecalciferol (CALCIUM 600 + D PO) Take 1 Tab by mouth 2 Times a Day.     • clozapine (CLOZARIL) 100 MG TABS Take 100 mg by mouth 2 times a day. 200MG in Am and 300MG HS  Indications: two tabs every morning and 4 tabs every bedtime     • therapeutic multivitamin-minerals (THERAGRAN-M) TABS Take 1 Tab by mouth every day.       No current facility-administered medications for this visit.       Allergies:  Allergies   Allergen Reactions   • Azithromycin    • Fluphenazine      hallucinations   • Haldol [Haloperidol]    • Erythromycin      Pt on Clozapine/no erythromycin   • Haldol [Haloperidol Lactate]      Does not tolerate       Family History:   family history includes Heart Disease in her mother.     Social History:    Social History     Tobacco Use   • Smoking status: Former Smoker     Types: Cigarettes     Quit date: 12/12/2011     Years since quitting: 10.0   • Smokeless tobacco: Never Used   • Tobacco comment: smoked for 45 years   Substance Use Topics   • Alcohol use: No     Alcohol/week: 0.0 oz   • Drug use: No       Physical Exam:  Vitals:    12/16/21 1312   BP: 125/79   Pulse: (!) 116   Temp: 35.9 °C (96.6 °F)   SpO2: 97%     Body mass index  is 27.88 kg/m².  Physical Exam  Constitutional:       General: She is not in acute distress.     Appearance: Normal appearance. She is not ill-appearing.   HENT:      Head: Normocephalic and atraumatic.      Right Ear: External ear normal.      Left Ear: External ear normal.   Eyes:      General: No scleral icterus.     Extraocular Movements: Extraocular movements intact.      Conjunctiva/sclera: Conjunctivae normal.   Cardiovascular:      Rate and Rhythm: Regular rhythm. Tachycardia present.      Pulses: Normal pulses.      Heart sounds: Normal heart sounds. No murmur heard.  No friction rub. No gallop.    Pulmonary:      Effort: Pulmonary effort is normal. No respiratory distress.      Breath sounds: Normal breath sounds. No wheezing, rhonchi or rales.   Abdominal:      General: Bowel sounds are normal.      Palpations: Abdomen is soft.      Tenderness: There is no abdominal tenderness. There is no guarding or rebound.   Musculoskeletal:      Right lower leg: No edema.      Left lower leg: No edema.   Skin:     General: Skin is warm and dry.   Neurological:      General: No focal deficit present.      Mental Status: She is alert.   Psychiatric:         Mood and Affect: Mood normal.         Behavior: Behavior normal.        Assessment/Plan:  1. Constipation  Improved from before, now getting BM every 2-3 days and not straining.   - no abdominal pain, nausea, vomiting, benign abdominal exam on physical exam  - informed pt and caregiver that they can try increasing miralax from 1 cap to 2 caps daily or 2 caps total twice daily if needed.      2. Hyperlipidemia  Lipid panel on 12/8/21 showed total cholesterol 252, , HDL 72, LDL elevated 162. According to pt's ascvd score, no need to initiate statin medication at this time  - diet education, recommended to decrease carb/fat intake     3. Flu vaccine need  - INFLUENZA VACCINE QUAD INJ (PF)  - pt given flu vaccine during today's visit. Up to date for 2021.    4.  Preventative health care   - pt due for pap smear, pt would like to defer this to next visit.     5. Paranoid schizophrenia  - schizophrenia/mood is well controlled with her current medication regimen.     All imaging results and lab results and consult notes are reviewed at this visit.  Followup: Return in about 6 months (around 6/16/2022), or if symptoms worsen or fail to improve.    Carito Zamora, DO  Internal Medicine PGY-1

## 2021-12-17 PROBLEM — Z00.00 PREVENTATIVE HEALTH CARE: Status: RESOLVED | Noted: 2021-10-07 | Resolved: 2021-12-17

## 2021-12-17 ASSESSMENT — ENCOUNTER SYMPTOMS
COUGH: 0
DIZZINESS: 0
ABDOMINAL PAIN: 0
MUSCULOSKELETAL NEGATIVE: 1
HEADACHES: 0
EYES NEGATIVE: 1
CONSTIPATION: 1
VOMITING: 0
NAUSEA: 0
FEVER: 0
DIARRHEA: 0
SHORTNESS OF BREATH: 0
PALPITATIONS: 0
SPUTUM PRODUCTION: 0
CHILLS: 0

## 2022-01-17 NOTE — TELEPHONE ENCOUNTER
Approved and sent  
Last seen: 12/16/21 by Dr. Zamora  Next appt: 06/07/22 with Dr. Zamora    Was the patient seen in the last year in this department? Yes   Does patient have an active prescription for medications requested? No   Received Request Via: Pharmacy  
No

## 2022-01-25 RX ORDER — DOCUSATE CALCIUM 240 MG
240 CAPSULE ORAL DAILY
Qty: 60 CAPSULE | Refills: 0 | Status: SHIPPED | OUTPATIENT
Start: 2022-01-25 | End: 2022-02-28 | Stop reason: SDUPTHER

## 2022-01-25 NOTE — TELEPHONE ENCOUNTER
Last seen: 12/16/21 by Dr. Zamora  Next appt: 06/07/22 with Dr. Zamora    Was the patient seen in the last year in this department? Yes   Does patient have an active prescription for medications requested? No   Received Request Via: Pharmacy

## 2022-02-22 ENCOUNTER — HOSPITAL ENCOUNTER (INPATIENT)
Facility: MEDICAL CENTER | Age: 67
LOS: 1 days | DRG: 872 | End: 2022-02-25
Attending: EMERGENCY MEDICINE | Admitting: HOSPITALIST
Payer: MEDICARE

## 2022-02-22 DIAGNOSIS — A41.9 SEPSIS WITHOUT ACUTE ORGAN DYSFUNCTION, DUE TO UNSPECIFIED ORGANISM (HCC): ICD-10-CM

## 2022-02-22 DIAGNOSIS — L03.317 CELLULITIS OF BUTTOCK: ICD-10-CM

## 2022-02-22 DIAGNOSIS — I95.9 HYPOTENSION, UNSPECIFIED HYPOTENSION TYPE: ICD-10-CM

## 2022-02-22 DIAGNOSIS — R09.02 HYPOXIA: ICD-10-CM

## 2022-02-22 LAB
ALBUMIN SERPL BCP-MCNC: 3.7 G/DL (ref 3.2–4.9)
ALBUMIN/GLOB SERPL: 1.3 G/DL
ALP SERPL-CCNC: 90 U/L (ref 30–99)
ALT SERPL-CCNC: 8 U/L (ref 2–50)
ANION GAP SERPL CALC-SCNC: 11 MMOL/L (ref 7–16)
APPEARANCE UR: CLEAR
AST SERPL-CCNC: 25 U/L (ref 12–45)
BACTERIA #/AREA URNS HPF: NEGATIVE /HPF
BASOPHILS # BLD AUTO: 0.2 % (ref 0–1.8)
BASOPHILS # BLD: 0.03 K/UL (ref 0–0.12)
BILIRUB SERPL-MCNC: 0.3 MG/DL (ref 0.1–1.5)
BILIRUB UR QL STRIP.AUTO: NEGATIVE
BUN SERPL-MCNC: 14 MG/DL (ref 8–22)
CALCIUM SERPL-MCNC: 8.5 MG/DL (ref 8.5–10.5)
CHLORIDE SERPL-SCNC: 99 MMOL/L (ref 96–112)
CO2 SERPL-SCNC: 20 MMOL/L (ref 20–33)
COLOR UR: YELLOW
CREAT SERPL-MCNC: 0.88 MG/DL (ref 0.5–1.4)
EOSINOPHIL # BLD AUTO: 0 K/UL (ref 0–0.51)
EOSINOPHIL NFR BLD: 0 % (ref 0–6.9)
EPI CELLS #/AREA URNS HPF: NEGATIVE /HPF
ERYTHROCYTE [DISTWIDTH] IN BLOOD BY AUTOMATED COUNT: 51.1 FL (ref 35.9–50)
GLOBULIN SER CALC-MCNC: 2.9 G/DL (ref 1.9–3.5)
GLUCOSE SERPL-MCNC: 153 MG/DL (ref 65–99)
GLUCOSE UR STRIP.AUTO-MCNC: NEGATIVE MG/DL
HCT VFR BLD AUTO: 37.1 % (ref 37–47)
HGB BLD-MCNC: 12.7 G/DL (ref 12–16)
HYALINE CASTS #/AREA URNS LPF: ABNORMAL /LPF
IMM GRANULOCYTES # BLD AUTO: 0.1 K/UL (ref 0–0.11)
IMM GRANULOCYTES NFR BLD AUTO: 0.7 % (ref 0–0.9)
KETONES UR STRIP.AUTO-MCNC: NEGATIVE MG/DL
LACTATE BLD-SCNC: 0.9 MMOL/L (ref 0.5–2)
LEUKOCYTE ESTERASE UR QL STRIP.AUTO: NEGATIVE
LYMPHOCYTES # BLD AUTO: 0.63 K/UL (ref 1–4.8)
LYMPHOCYTES NFR BLD: 4.4 % (ref 22–41)
MCH RBC QN AUTO: 31.1 PG (ref 27–33)
MCHC RBC AUTO-ENTMCNC: 34.2 G/DL (ref 33.6–35)
MCV RBC AUTO: 90.9 FL (ref 81.4–97.8)
MICRO URNS: ABNORMAL
MONOCYTES # BLD AUTO: 0.69 K/UL (ref 0–0.85)
MONOCYTES NFR BLD AUTO: 4.8 % (ref 0–13.4)
NEUTROPHILS # BLD AUTO: 12.98 K/UL (ref 2–7.15)
NEUTROPHILS NFR BLD: 89.9 % (ref 44–72)
NITRITE UR QL STRIP.AUTO: NEGATIVE
NRBC # BLD AUTO: 0 K/UL
NRBC BLD-RTO: 0 /100 WBC
PH UR STRIP.AUTO: 6 [PH] (ref 5–8)
PLATELET # BLD AUTO: 179 K/UL (ref 164–446)
PMV BLD AUTO: 10.3 FL (ref 9–12.9)
POTASSIUM SERPL-SCNC: 4.5 MMOL/L (ref 3.6–5.5)
PROT SERPL-MCNC: 6.6 G/DL (ref 6–8.2)
PROT UR QL STRIP: NEGATIVE MG/DL
RBC # BLD AUTO: 4.08 M/UL (ref 4.2–5.4)
RBC # URNS HPF: ABNORMAL /HPF
RBC UR QL AUTO: ABNORMAL
SODIUM SERPL-SCNC: 130 MMOL/L (ref 135–145)
SP GR UR STRIP.AUTO: 1.01
UROBILINOGEN UR STRIP.AUTO-MCNC: 0.2 MG/DL
WBC # BLD AUTO: 14.4 K/UL (ref 4.8–10.8)
WBC #/AREA URNS HPF: ABNORMAL /HPF

## 2022-02-22 PROCEDURE — 99285 EMERGENCY DEPT VISIT HI MDM: CPT

## 2022-02-22 PROCEDURE — 81001 URINALYSIS AUTO W/SCOPE: CPT

## 2022-02-22 PROCEDURE — A9270 NON-COVERED ITEM OR SERVICE: HCPCS | Performed by: EMERGENCY MEDICINE

## 2022-02-22 PROCEDURE — 0241U HCHG SARS-COV-2 COVID-19 NFCT DS RESP RNA 4 TRGT MIC: CPT

## 2022-02-22 PROCEDURE — 83735 ASSAY OF MAGNESIUM: CPT

## 2022-02-22 PROCEDURE — 85025 COMPLETE CBC W/AUTO DIFF WBC: CPT

## 2022-02-22 PROCEDURE — 86140 C-REACTIVE PROTEIN: CPT

## 2022-02-22 PROCEDURE — 84145 PROCALCITONIN (PCT): CPT

## 2022-02-22 PROCEDURE — 87040 BLOOD CULTURE FOR BACTERIA: CPT

## 2022-02-22 PROCEDURE — 80053 COMPREHEN METABOLIC PANEL: CPT

## 2022-02-22 PROCEDURE — 83605 ASSAY OF LACTIC ACID: CPT

## 2022-02-22 PROCEDURE — 700102 HCHG RX REV CODE 250 W/ 637 OVERRIDE(OP): Performed by: EMERGENCY MEDICINE

## 2022-02-22 PROCEDURE — 96374 THER/PROPH/DIAG INJ IV PUSH: CPT

## 2022-02-22 PROCEDURE — C9803 HOPD COVID-19 SPEC COLLECT: HCPCS | Performed by: EMERGENCY MEDICINE

## 2022-02-22 RX ORDER — ACETAMINOPHEN 325 MG/1
650 TABLET ORAL ONCE
Status: COMPLETED | OUTPATIENT
Start: 2022-02-22 | End: 2022-02-22

## 2022-02-22 RX ADMIN — ACETAMINOPHEN 650 MG: 325 TABLET, FILM COATED ORAL at 22:11

## 2022-02-22 ASSESSMENT — LIFESTYLE VARIABLES: DO YOU DRINK ALCOHOL: NO

## 2022-02-22 ASSESSMENT — FIBROSIS 4 INDEX: FIB4 SCORE: 1.92

## 2022-02-23 ENCOUNTER — APPOINTMENT (OUTPATIENT)
Dept: RADIOLOGY | Facility: MEDICAL CENTER | Age: 67
DRG: 872 | End: 2022-02-23
Attending: EMERGENCY MEDICINE
Payer: MEDICARE

## 2022-02-23 PROBLEM — L03.90 CELLULITIS: Status: ACTIVE | Noted: 2022-02-23

## 2022-02-23 PROBLEM — A41.9 SEPSIS (HCC): Status: ACTIVE | Noted: 2022-02-23

## 2022-02-23 LAB
CRP SERPL HS-MCNC: 9.54 MG/DL (ref 0–0.75)
FLUAV RNA SPEC QL NAA+PROBE: NEGATIVE
FLUBV RNA SPEC QL NAA+PROBE: NEGATIVE
MAGNESIUM SERPL-MCNC: 2 MG/DL (ref 1.5–2.5)
PROCALCITONIN SERPL-MCNC: 0.15 NG/ML
RSV RNA SPEC QL NAA+PROBE: NEGATIVE
SARS-COV-2 RNA RESP QL NAA+PROBE: NOTDETECTED
SCCMEC + MECA PNL NOSE NAA+PROBE: NEGATIVE
SPECIMEN SOURCE: NORMAL

## 2022-02-23 PROCEDURE — A9270 NON-COVERED ITEM OR SERVICE: HCPCS | Performed by: INTERNAL MEDICINE

## 2022-02-23 PROCEDURE — 87040 BLOOD CULTURE FOR BACTERIA: CPT

## 2022-02-23 PROCEDURE — 700102 HCHG RX REV CODE 250 W/ 637 OVERRIDE(OP): Performed by: HOSPITALIST

## 2022-02-23 PROCEDURE — A9270 NON-COVERED ITEM OR SERVICE: HCPCS | Performed by: HOSPITALIST

## 2022-02-23 PROCEDURE — 700111 HCHG RX REV CODE 636 W/ 250 OVERRIDE (IP): Performed by: HOSPITALIST

## 2022-02-23 PROCEDURE — 87641 MR-STAPH DNA AMP PROBE: CPT

## 2022-02-23 PROCEDURE — 99220 PR INITIAL OBSERVATION CARE,LEVL III: CPT | Performed by: HOSPITALIST

## 2022-02-23 PROCEDURE — G0378 HOSPITAL OBSERVATION PER HR: HCPCS

## 2022-02-23 PROCEDURE — 700105 HCHG RX REV CODE 258: Performed by: EMERGENCY MEDICINE

## 2022-02-23 PROCEDURE — 700111 HCHG RX REV CODE 636 W/ 250 OVERRIDE (IP): Performed by: EMERGENCY MEDICINE

## 2022-02-23 PROCEDURE — 71045 X-RAY EXAM CHEST 1 VIEW: CPT

## 2022-02-23 PROCEDURE — 96372 THER/PROPH/DIAG INJ SC/IM: CPT

## 2022-02-23 PROCEDURE — 700105 HCHG RX REV CODE 258: Performed by: HOSPITALIST

## 2022-02-23 PROCEDURE — 700102 HCHG RX REV CODE 250 W/ 637 OVERRIDE(OP): Performed by: INTERNAL MEDICINE

## 2022-02-23 RX ORDER — ONDANSETRON 4 MG/1
4 TABLET, ORALLY DISINTEGRATING ORAL EVERY 4 HOURS PRN
Status: DISCONTINUED | OUTPATIENT
Start: 2022-02-23 | End: 2022-02-25 | Stop reason: HOSPADM

## 2022-02-23 RX ORDER — AMOXICILLIN 250 MG
2 CAPSULE ORAL 2 TIMES DAILY
Status: DISCONTINUED | OUTPATIENT
Start: 2022-02-23 | End: 2022-02-23

## 2022-02-23 RX ORDER — POLYETHYLENE GLYCOL 3350 17 G/17G
1 POWDER, FOR SOLUTION ORAL DAILY
Status: DISCONTINUED | OUTPATIENT
Start: 2022-02-23 | End: 2022-02-23

## 2022-02-23 RX ORDER — CEFTRIAXONE 2 G/1
2 INJECTION, POWDER, FOR SOLUTION INTRAMUSCULAR; INTRAVENOUS ONCE
Status: COMPLETED | OUTPATIENT
Start: 2022-02-23 | End: 2022-02-23

## 2022-02-23 RX ORDER — POLYETHYLENE GLYCOL 3350 17 G/17G
1 POWDER, FOR SOLUTION ORAL
Status: DISCONTINUED | OUTPATIENT
Start: 2022-02-23 | End: 2022-02-23

## 2022-02-23 RX ORDER — CLOZAPINE 100 MG/1
100 TABLET ORAL 2 TIMES DAILY
Status: DISCONTINUED | OUTPATIENT
Start: 2022-02-23 | End: 2022-02-23

## 2022-02-23 RX ORDER — CLOZAPINE 100 MG/1
200 TABLET ORAL EVERY MORNING
Status: DISCONTINUED | OUTPATIENT
Start: 2022-02-23 | End: 2022-02-25 | Stop reason: HOSPADM

## 2022-02-23 RX ORDER — BISACODYL 10 MG
10 SUPPOSITORY, RECTAL RECTAL
Status: DISCONTINUED | OUTPATIENT
Start: 2022-02-23 | End: 2022-02-23

## 2022-02-23 RX ORDER — SODIUM CHLORIDE, SODIUM LACTATE, POTASSIUM CHLORIDE, AND CALCIUM CHLORIDE .6; .31; .03; .02 G/100ML; G/100ML; G/100ML; G/100ML
1000 INJECTION, SOLUTION INTRAVENOUS ONCE
Status: ACTIVE | OUTPATIENT
Start: 2022-02-23 | End: 2022-02-24

## 2022-02-23 RX ORDER — AMOXICILLIN 250 MG
2 CAPSULE ORAL 2 TIMES DAILY
Status: DISCONTINUED | OUTPATIENT
Start: 2022-02-23 | End: 2022-02-25 | Stop reason: HOSPADM

## 2022-02-23 RX ORDER — ACETAMINOPHEN 325 MG/1
650 TABLET ORAL EVERY 6 HOURS PRN
Status: DISCONTINUED | OUTPATIENT
Start: 2022-02-23 | End: 2022-02-25 | Stop reason: HOSPADM

## 2022-02-23 RX ORDER — SODIUM CHLORIDE, SODIUM LACTATE, POTASSIUM CHLORIDE, CALCIUM CHLORIDE 600; 310; 30; 20 MG/100ML; MG/100ML; MG/100ML; MG/100ML
INJECTION, SOLUTION INTRAVENOUS CONTINUOUS
Status: DISCONTINUED | OUTPATIENT
Start: 2022-02-23 | End: 2022-02-25 | Stop reason: HOSPADM

## 2022-02-23 RX ORDER — LABETALOL HYDROCHLORIDE 5 MG/ML
10 INJECTION, SOLUTION INTRAVENOUS EVERY 4 HOURS PRN
Status: DISCONTINUED | OUTPATIENT
Start: 2022-02-23 | End: 2022-02-25 | Stop reason: HOSPADM

## 2022-02-23 RX ORDER — POLYETHYLENE GLYCOL 3350 17 G/17G
1 POWDER, FOR SOLUTION ORAL 2 TIMES DAILY
Status: DISCONTINUED | OUTPATIENT
Start: 2022-02-23 | End: 2022-02-25 | Stop reason: HOSPADM

## 2022-02-23 RX ORDER — ONDANSETRON 2 MG/ML
4 INJECTION INTRAMUSCULAR; INTRAVENOUS EVERY 4 HOURS PRN
Status: DISCONTINUED | OUTPATIENT
Start: 2022-02-23 | End: 2022-02-25 | Stop reason: HOSPADM

## 2022-02-23 RX ORDER — SODIUM CHLORIDE, SODIUM LACTATE, POTASSIUM CHLORIDE, CALCIUM CHLORIDE 600; 310; 30; 20 MG/100ML; MG/100ML; MG/100ML; MG/100ML
1000 INJECTION, SOLUTION INTRAVENOUS ONCE
Status: COMPLETED | OUTPATIENT
Start: 2022-02-23 | End: 2022-02-23

## 2022-02-23 RX ORDER — BISACODYL 10 MG
10 SUPPOSITORY, RECTAL RECTAL
Status: DISCONTINUED | OUTPATIENT
Start: 2022-02-23 | End: 2022-02-25 | Stop reason: HOSPADM

## 2022-02-23 RX ORDER — ASENAPINE 10 MG/1
10 TABLET SUBLINGUAL DAILY
Status: DISCONTINUED | OUTPATIENT
Start: 2022-02-23 | End: 2022-02-23

## 2022-02-23 RX ORDER — CLOZAPINE 100 MG/1
400 TABLET ORAL EVERY EVENING
Status: DISCONTINUED | OUTPATIENT
Start: 2022-02-23 | End: 2022-02-25 | Stop reason: HOSPADM

## 2022-02-23 RX ADMIN — CLOZAPINE 400 MG: 100 TABLET ORAL at 19:07

## 2022-02-23 RX ADMIN — CEFTRIAXONE SODIUM 2 G: 2 INJECTION, POWDER, FOR SOLUTION INTRAMUSCULAR; INTRAVENOUS at 01:16

## 2022-02-23 RX ADMIN — SODIUM CHLORIDE, POTASSIUM CHLORIDE, SODIUM LACTATE AND CALCIUM CHLORIDE 1000 ML: 600; 310; 30; 20 INJECTION, SOLUTION INTRAVENOUS at 02:00

## 2022-02-23 RX ADMIN — POLYETHYLENE GLYCOL 3350 1 PACKET: 17 POWDER, FOR SOLUTION ORAL at 17:41

## 2022-02-23 RX ADMIN — SENNOSIDES AND DOCUSATE SODIUM 2 TABLET: 50; 8.6 TABLET ORAL at 17:41

## 2022-02-23 RX ADMIN — DOCUSATE SODIUM 50 MG AND SENNOSIDES 8.6 MG 2 TABLET: 8.6; 5 TABLET, FILM COATED ORAL at 06:12

## 2022-02-23 RX ADMIN — Medication 1 TABLET: at 06:12

## 2022-02-23 RX ADMIN — SODIUM CHLORIDE, POTASSIUM CHLORIDE, SODIUM LACTATE AND CALCIUM CHLORIDE: 600; 310; 30; 20 INJECTION, SOLUTION INTRAVENOUS at 05:32

## 2022-02-23 RX ADMIN — CLOZAPINE 200 MG: 100 TABLET ORAL at 06:12

## 2022-02-23 RX ADMIN — ENOXAPARIN SODIUM 40 MG: 40 INJECTION SUBCUTANEOUS at 06:12

## 2022-02-23 ASSESSMENT — PATIENT HEALTH QUESTIONNAIRE - PHQ9
SUM OF ALL RESPONSES TO PHQ9 QUESTIONS 1 AND 2: 0
1. LITTLE INTEREST OR PLEASURE IN DOING THINGS: NOT AT ALL
2. FEELING DOWN, DEPRESSED, IRRITABLE, OR HOPELESS: NOT AT ALL

## 2022-02-23 ASSESSMENT — ENCOUNTER SYMPTOMS
HALLUCINATIONS: 0
SINUS PAIN: 0
FEVER: 1
BLURRED VISION: 0
SORE THROAT: 0
WHEEZING: 0
MYALGIAS: 0
CHILLS: 1
SPUTUM PRODUCTION: 0
PND: 0
POLYDIPSIA: 0
WEAKNESS: 1
PALPITATIONS: 0
PHOTOPHOBIA: 0
DOUBLE VISION: 0
TINGLING: 0
CLAUDICATION: 0
HEMOPTYSIS: 0
BACK PAIN: 0
DIAPHORESIS: 0
COUGH: 0
HALLUCINATIONS: 1
VOMITING: 0
DEPRESSION: 0
BRUISES/BLEEDS EASILY: 0
DIARRHEA: 0
CONSTIPATION: 0
NAUSEA: 0
ABDOMINAL PAIN: 0
BLOOD IN STOOL: 0
EYE PAIN: 0
HEARTBURN: 0
FLANK PAIN: 0
STRIDOR: 0
CONSTIPATION: 1
DIZZINESS: 1
HEADACHES: 0
TREMORS: 0
SHORTNESS OF BREATH: 0
FALLS: 0
ORTHOPNEA: 0
NECK PAIN: 0

## 2022-02-23 ASSESSMENT — LIFESTYLE VARIABLES
TOTAL SCORE: 0
HOW MANY TIMES IN THE PAST YEAR HAVE YOU HAD 5 OR MORE DRINKS IN A DAY: 0
ALCOHOL_USE: NO
EVER FELT BAD OR GUILTY ABOUT YOUR DRINKING: NO
TOTAL SCORE: 0
SUBSTANCE_ABUSE: 0
ON A TYPICAL DAY WHEN YOU DRINK ALCOHOL HOW MANY DRINKS DO YOU HAVE: 0
HAVE PEOPLE ANNOYED YOU BY CRITICIZING YOUR DRINKING: NO
TOTAL SCORE: 0
HAVE YOU EVER FELT YOU SHOULD CUT DOWN ON YOUR DRINKING: NO
AVERAGE NUMBER OF DAYS PER WEEK YOU HAVE A DRINK CONTAINING ALCOHOL: 0
CONSUMPTION TOTAL: NEGATIVE
EVER HAD A DRINK FIRST THING IN THE MORNING TO STEADY YOUR NERVES TO GET RID OF A HANGOVER: NO
DOES PATIENT WANT TO STOP DRINKING: NO

## 2022-02-23 ASSESSMENT — COGNITIVE AND FUNCTIONAL STATUS - GENERAL
WALKING IN HOSPITAL ROOM: A LOT
SUGGESTED CMS G CODE MODIFIER MOBILITY: CK
MOBILITY SCORE: 16
CLIMB 3 TO 5 STEPS WITH RAILING: A LOT
DAILY ACTIVITIY SCORE: 20
STANDING UP FROM CHAIR USING ARMS: A LOT
SUGGESTED CMS G CODE MODIFIER DAILY ACTIVITY: CJ
MOVING TO AND FROM BED TO CHAIR: A LITTLE
TOILETING: A LOT
MOVING FROM LYING ON BACK TO SITTING ON SIDE OF FLAT BED: A LITTLE
HELP NEEDED FOR BATHING: A LITTLE
DRESSING REGULAR LOWER BODY CLOTHING: A LITTLE

## 2022-02-23 ASSESSMENT — COPD QUESTIONNAIRES
DO YOU EVER COUGH UP ANY MUCUS OR PHLEGM?: NO/ONLY WITH OCCASIONAL COLDS OR INFECTIONS
COPD SCREENING SCORE: 5
HAVE YOU SMOKED AT LEAST 100 CIGARETTES IN YOUR ENTIRE LIFE: YES
DURING THE PAST 4 WEEKS HOW MUCH DID YOU FEEL SHORT OF BREATH: SOME OF THE TIME

## 2022-02-23 ASSESSMENT — PAIN DESCRIPTION - PAIN TYPE: TYPE: ACUTE PAIN

## 2022-02-23 NOTE — ED NOTES
Attempted to ambulated, pt said she felt dizzy and like she was going to fall over, orthostats done provider notified.MD also notified about pt rash to right hip, MD at bedside to assess.

## 2022-02-23 NOTE — PROGRESS NOTES
Hospital Medicine Daily Progress Note    Date of Service  2/23/2022    Chief Complaint  Gena Dykes is a 66 y.o. female admitted 2/22/2022 with weakness and rash    Hospital Course  No notes on file    Interval Problem Update  - admitted this am  - denies any pain around buttocks lesion/rash  - febrile 38.2  - reports being constipated, asking for stool softners    I have personally seen and examined the patient at bedside. I discussed the plan of care with patient and bedside RN.    Consultants/Specialty  None    Code Status  Full Code    Disposition  Patient is not medically cleared for discharge.   Anticipate discharge to to home with close outpatient follow-up.  I have placed the appropriate orders for post-discharge needs.    Review of Systems  Review of Systems   Constitutional: Positive for fever.   Gastrointestinal: Positive for constipation.   Skin: Positive for rash.   Psychiatric/Behavioral: Positive for hallucinations.        Physical Exam  Temp:  [36.7 °C (98 °F)-38.2 °C (100.8 °F)] 36.7 °C (98 °F)  Pulse:  [] 84  Resp:  [13-23] 20  BP: ()/(48-76) 119/67  SpO2:  [85 %-98 %] 96 %    Physical Exam  Vitals and nursing note reviewed.   Constitutional:       General: She is not in acute distress.     Appearance: She is not ill-appearing, toxic-appearing or diaphoretic.   HENT:      Head: Normocephalic and atraumatic.      Nose: Nose normal.      Mouth/Throat:      Mouth: Mucous membranes are moist.      Pharynx: Oropharynx is clear.   Eyes:      General: No scleral icterus.     Conjunctiva/sclera: Conjunctivae normal.   Cardiovascular:      Rate and Rhythm: Normal rate and regular rhythm.      Heart sounds: No murmur heard.    No friction rub. No gallop.   Pulmonary:      Effort: Pulmonary effort is normal.      Breath sounds: Rales present. No wheezing or rhonchi.   Abdominal:      General: Bowel sounds are normal. There is distension.      Palpations: Abdomen is soft.      Tenderness:  There is no abdominal tenderness.   Musculoskeletal:      Cervical back: Normal range of motion.      Right lower leg: No edema.      Left lower leg: No edema.   Skin:     Findings: Erythema (R buttock patch of erythema, nontender, slighty increased outside marker) present.   Neurological:      Mental Status: She is alert.   Psychiatric:         Mood and Affect: Mood and affect normal.         Speech: Speech normal.         Thought Content: Thought content is delusional.         Fluids  No intake or output data in the 24 hours ending 02/23/22 1125    Laboratory  Recent Labs     02/22/22  2208   WBC 14.4*   RBC 4.08*   HEMOGLOBIN 12.7   HEMATOCRIT 37.1   MCV 90.9   MCH 31.1   MCHC 34.2   RDW 51.1*   PLATELETCT 179   MPV 10.3     Recent Labs     02/22/22  2208   SODIUM 130*   POTASSIUM 4.5   CHLORIDE 99   CO2 20   GLUCOSE 153*   BUN 14   CREATININE 0.88   CALCIUM 8.5                   Imaging  DX-CHEST-PORTABLE (1 VIEW)   Final Result         1.  No acute cardiopulmonary disease.   2.  Atherosclerosis           Assessment/Plan  * Cellulitis- (present on admission)  Assessment & Plan  R buttock rash  Fevers, elevated WBC, high CRP, however negative procal  Without any evidence of drainage or ope wound  Check hba1c and HIV  Start ceftriaxone      Sepsis (HCC)  Assessment & Plan  This is Sepsis Present on admission  SIRS criteria identified on my evaluation include: Tachycardia, with heart rate greater than 90 BPM and Leukocytosis, with WBC greater than 12,000  Source is right buttock cellulitis   Sepsis protocol initiated  Fluid resuscitation ordered per protocol  IV antibiotics as appropriate for source of sepsis  While organ dysfunction may be noted elsewhere in this problem list or in the chart, degree of organ dysfunction does not meet CMS criteria for severe sepsis          Paranoid schizophrenia (HCC)- (present on admission)  Assessment & Plan  Restart home medications       VTE prophylaxis: enoxaparin ppx    I  have performed a physical exam and reviewed and updated ROS and Plan today (2/23/2022). In review of yesterday's note (2/22/2022), there are no changes except as documented above.

## 2022-02-23 NOTE — ED TRIAGE NOTES
Chief Complaint   Patient presents with   • Weakness     Pt BIB [facility] from a group home for generalized weakness since noon today. Pt reports having aches and chills for the past week. Axox4, stomach slightly distended.      Pt 91% for ems placed on 2L NC en route. GCS 15.

## 2022-02-23 NOTE — ED NOTES
Report received from RAMEZ Joiner.  Patient is A&Ox4 and awake in bed. Patient is in no signs of distress, VSS, on cardiac monitor and pulse ox unlabored breathing on 1L and denies pain at this time. Patient was updated on the plan of care. Call light within reach, bed in low position, 2 side rails up. Stated she would not attempt ambulation without staff present.

## 2022-02-23 NOTE — ASSESSMENT & PLAN NOTE
This is Sepsis Present on admission  SIRS criteria identified on my evaluation include: Tachycardia, with heart rate greater than 90 BPM and Leukocytosis, with WBC greater than 12,000  Source is right buttock cellulitis   Sepsis protocol initiated  Fluid resuscitation ordered per protocol  IV antibiotics as appropriate for source of sepsis  While organ dysfunction may be noted elsewhere in this problem list or in the chart, degree of organ dysfunction does not meet CMS criteria for severe sepsis

## 2022-02-23 NOTE — ASSESSMENT & PLAN NOTE
R buttock rash  Fevers, elevated WBC, high CRP, however negative procal  Without any evidence of drainage or ope wound  Check hba1c and HIV  Start ceftriaxone

## 2022-02-23 NOTE — H&P
Hospital Medicine History & Physical Note    Date of Service  2/23/2022    Primary Care Physician  Carito Zamora D.O.    Consultants  None    Specialist Names: None    Code Status  Full Code    Chief Complaint  Chief Complaint   Patient presents with   • Weakness     Pt XI [facility] from a group home for generalized weakness since noon today. Pt reports having aches and chills for the past week. Axox4, stomach slightly distended.        History of Presenting Illness  Gena Dykes is a 66 y.o. female who presented 2/22/2022 with past medical history of breast cancer, anal cancer, schizophrenia who comes into the emergency room with complaints of generalized weakness for 1 day.  Patient states that she tried to get up today and walk but was unable to.  It was associated with dizziness and lightheadedness.  Patient also states that she has fevers and chills.  She denies any back pain, sciatica, photophobia, or LE numbness. She denies any nausea, vomiting, diarrhea, abdominal pain, chest pain, sore throat, runny nose, facial pain, burning micturition, joint aches, and cough.  Patient does have cellulitis of the right buttocks region present for the past week.  She denies any drainage or open wound in the area.  Patient states that she puts cream and lotion on this but this is not something that she started recently.    Chest x-ray found no acute abnormality    I discussed the plan of care with patient.    Review of Systems  Review of Systems   Constitutional: Positive for chills and fever. Negative for diaphoresis and malaise/fatigue.   HENT: Negative for congestion, ear discharge, ear pain, hearing loss, nosebleeds, sinus pain, sore throat and tinnitus.    Eyes: Negative for blurred vision, double vision, photophobia and pain.   Respiratory: Negative for cough, hemoptysis, sputum production, shortness of breath, wheezing and stridor.    Cardiovascular: Negative for chest pain, palpitations, orthopnea, claudication, leg  swelling and PND.   Gastrointestinal: Negative for abdominal pain, blood in stool, constipation, diarrhea, heartburn, melena, nausea and vomiting.   Genitourinary: Negative for dysuria, flank pain, frequency, hematuria and urgency.   Musculoskeletal: Negative for back pain, falls, joint pain, myalgias and neck pain.   Skin: Negative for itching and rash.   Neurological: Positive for dizziness and weakness. Negative for tingling, tremors and headaches.   Endo/Heme/Allergies: Negative for environmental allergies and polydipsia. Does not bruise/bleed easily.   Psychiatric/Behavioral: Negative for depression, hallucinations, substance abuse and suicidal ideas.       Past Medical History   has no past medical history on file.    Surgical History   has a past surgical history that includes lumpectomy (Left) and mass excision general (7/18/2017).     Family History  family history includes Heart Disease in her mother.   Family history reviewed with patient. There is no family history that is pertinent to the chief complaint.     Social History   reports that she quit smoking about 10 years ago. Her smoking use included cigarettes. She has never used smokeless tobacco. She reports that she does not drink alcohol and does not use drugs.    Allergies  Allergies   Allergen Reactions   • Azithromycin    • Fluphenazine      hallucinations   • Haldol [Haloperidol]    • Erythromycin      Pt on Clozapine/no erythromycin   • Haldol [Haloperidol Lactate]      Does not tolerate       Medications  Prior to Admission Medications   Prescriptions Last Dose Informant Patient Reported? Taking?   Asenapine Maleate 10 MG SL Tab   Yes No   Sig: Place  under the tongue. Place 1 tab under tongue twice a day   Calcium Carbonate+Vitamin D (CALCIUM 600 + D) 600-200 MG-UNIT Tab   No No   Sig: Take 1 Tablet by mouth every day.   Omega-3 Fatty Acids (FISH OIL CONCENTRATE) 300 MG Cap   Yes No   Sig: Take  by mouth.   STOOL SOFTENER 240 MG Cap   No No    Sig: Take 1 Capsule by mouth every day.   clozapine (CLOZARIL) 100 MG TABS  Caregiver Yes No   Sig: Take 100 mg by mouth 2 times a day. 200MG in Am and 300MG HS  Indications: two tabs every morning and 4 tabs every bedtime   polyethylene glycol/lytes (MIRALAX) Pack  Caregiver Yes No   Sig: Take 17 g by mouth every bedtime.   therapeutic multivitamin-minerals (THERAGRAN-M) TABS  Caregiver Yes No   Sig: Take 1 Tab by mouth every day.      Facility-Administered Medications: None       Physical Exam  Temp:  [36.7 °C (98 °F)-38.2 °C (100.8 °F)] 36.7 °C (98 °F)  Pulse:  [] 96  Resp:  [13-23] 20  BP: ()/(48-65) 108/56  SpO2:  [85 %-98 %] 95 %  Blood Pressure : 108/56   Temperature: 36.7 °C (98 °F)   Pulse: 96   Respiration: 20   Pulse Oximetry: 95 %       Physical Exam  Vitals and nursing note reviewed.   Constitutional:       General: She is not in acute distress.     Appearance: Normal appearance. She is not ill-appearing, toxic-appearing or diaphoretic.   HENT:      Head: Normocephalic and atraumatic.      Nose: No congestion or rhinorrhea.      Mouth/Throat:      Pharynx: No oropharyngeal exudate or posterior oropharyngeal erythema.   Eyes:      General: No scleral icterus.  Neck:      Vascular: No carotid bruit or JVD.   Cardiovascular:      Rate and Rhythm: Normal rate and regular rhythm.      Pulses: Normal pulses.      Heart sounds: Normal heart sounds. No murmur heard.    No friction rub. No gallop.   Pulmonary:      Effort: Pulmonary effort is normal. No respiratory distress.      Breath sounds: No stridor. No wheezing, rhonchi or rales.   Abdominal:      General: Abdomen is flat. There is no distension.      Palpations: There is no mass.      Tenderness: There is no abdominal tenderness. There is no left CVA tenderness, guarding or rebound.      Hernia: No hernia is present.   Musculoskeletal:         General: No swelling. Normal range of motion.      Cervical back: No rigidity. No muscular  tenderness.      Right lower leg: No edema.      Left lower leg: No edema.   Lymphadenopathy:      Cervical: No cervical adenopathy.   Skin:     General: Skin is warm and dry.      Capillary Refill: Capillary refill takes more than 3 seconds.      Coloration: Skin is not jaundiced or pale.      Findings: Rash present. No bruising or erythema.      Comments: Right buttock cellulitis without drainage   Neurological:      Mental Status: She is alert.         Laboratory:  Recent Labs     02/22/22 2208   WBC 14.4*   RBC 4.08*   HEMOGLOBIN 12.7   HEMATOCRIT 37.1   MCV 90.9   MCH 31.1   MCHC 34.2   RDW 51.1*   PLATELETCT 179   MPV 10.3     Recent Labs     02/22/22 2208   SODIUM 130*   POTASSIUM 4.5   CHLORIDE 99   CO2 20   GLUCOSE 153*   BUN 14   CREATININE 0.88   CALCIUM 8.5     Recent Labs     02/22/22 2208   ALTSGPT 8   ASTSGOT 25   ALKPHOSPHAT 90   TBILIRUBIN 0.3   GLUCOSE 153*         No results for input(s): NTPROBNP in the last 72 hours.      No results for input(s): TROPONINT in the last 72 hours.    Imaging:  DX-CHEST-PORTABLE (1 VIEW)   Final Result         1.  No acute cardiopulmonary disease.   2.  Atherosclerosis          X-Ray:  I have personally reviewed the images and compared with prior images.    Assessment/Plan:  I anticipate this patient is appropriate for observation status at this time.    * Cellulitis- (present on admission)  Assessment & Plan  Without any evidence of drainage or ope wound  Check hba1c and HIV  Start ceftriaxone      Sepsis (HCC)  Assessment & Plan  This is Sepsis Present on admission  SIRS criteria identified on my evaluation include: Tachycardia, with heart rate greater than 90 BPM and Leukocytosis, with WBC greater than 12,000  Source is right buttock cellulitis   Sepsis protocol initiated  Fluid resuscitation ordered per protocol  IV antibiotics as appropriate for source of sepsis  While organ dysfunction may be noted elsewhere in this problem list or in the chart, degree of  organ dysfunction does not meet CMS criteria for severe sepsis          Paranoid schizophrenia (HCC)- (present on admission)  Assessment & Plan  Restart home medications      VTE prophylaxis: SCDs/TEDs

## 2022-02-23 NOTE — ED PROVIDER NOTES
"ED Provider Note    Scribed for Dick Wahl M.D. by Carin Bell. 2/22/2022, 9:16 PM.    Primary care provider: Carito Zamora D.O.  Means of arrival: EMS  History obtained from: patient  History limited by: none    CHIEF COMPLAINT  Chief Complaint   Patient presents with   • Weakness     Pt XI [facility] from a group home for generalized weakness since noon today. Pt reports having aches and chills for the past week. Axox4, stomach slightly distended.        HPI  Gena Dykes is a 66 y.o. female who presents to the Emergency Department for evaluation of weakness onset today. Patient describes when she attempts to stand she feels as though she cannot move her legs and falls to the ground. She states this sensation has been going on for years, however per the nursing note she developed weakness at 12 pm today. Patient endorses fever the other day, but denies cough, sore throat, nausea, diarrhea, dysuria, or vomiting. Denies known COVID exposure. When asked about pain the patient states her pain is \"somewhere in my soul.\"    REVIEW OF SYSTEMS  Pertinent positives include weakness and fevers. Pertinent negatives include cough, nausea, diarrhea, dysuria, vomiting, or pain. All other systems negative.    PAST MEDICAL HISTORY   History of breast cancer    SURGICAL HISTORY   has a past surgical history that includes lumpectomy (Left) and mass excision general (7/18/2017).    SOCIAL HISTORY  Social History     Tobacco Use   • Smoking status: Former Smoker     Types: Cigarettes     Quit date: 12/12/2011     Years since quitting: 10.2   • Smokeless tobacco: Never Used   • Tobacco comment: smoked for 45 years   Vaping Use   • Vaping Use: Never used   Substance Use Topics   • Alcohol use: No     Alcohol/week: 0.0 oz   • Drug use: No      Social History     Substance and Sexual Activity   Drug Use No       FAMILY HISTORY  Family History   Problem Relation Age of Onset   • Heart Disease Mother        CURRENT " "MEDICATIONS  Home Medications    **Home medications have not yet been reviewed for this encounter**         ALLERGIES  Allergies   Allergen Reactions   • Azithromycin    • Fluphenazine      hallucinations   • Haldol [Haloperidol]    • Erythromycin      Pt on Clozapine/no erythromycin   • Haldol [Haloperidol Lactate]      Does not tolerate       PHYSICAL EXAM  VITAL SIGNS: /65   Pulse (!) 102   Temp (!) 38.2 °C (100.8 °F) (Oral)   Resp 20   Ht 1.6 m (5' 3\")   Wt 70.3 kg (155 lb)   LMP 07/03/2000 (Approximate)   SpO2 97%   BMI 27.46 kg/m²     Constitutional: Well developed, Well nourished, no distress.   HENT: Normocephalic, Atraumatic, mask in place.  Eyes: Conjunctiva normal, No discharge.   Neck: Supple, No stridor   Cardiovascular: tachycardic, Normal rhythm, No murmurs, equal pulses.   Pulmonary: Bilateral rales, No respiratory distress, No wheezing, No rales, No rhonchi.  Chest: No chest wall tenderness or deformity.   Abdomen:Soft, Mildly distended, No masses, no rebound, no guarding.   Back: No CVA tenderness.   Musculoskeletal: No major deformities noted, No tenderness. No edema in legs.  Skin: Warm, Dry, patient has a 15 x 15 cm area of erythema warmth and induration over her right lateral buttocks  Neurologic: Alert & oriented x 3, Normal motor function,  No focal deficits noted.   Psychiatric: Slightly odd affect, Judgment normal, Mood normal.       LABS  Results for orders placed or performed during the hospital encounter of 02/22/22   COV-2, FLU A/B, AND RSV BY PCR (2-4 HOURS [facility]): Collect NP swab in VTM    Specimen: Respirate   Result Value Ref Range    Influenza virus A RNA Negative Negative    Influenza virus B, PCR Negative Negative    RSV, PCR Negative Negative    SARS-CoV-2 by PCR NotDetected     SARS-CoV-2 Source NP Swab    CBC WITH DIFFERENTIAL   Result Value Ref Range    WBC 14.4 (H) 4.8 - 10.8 K/uL    RBC 4.08 (L) 4.20 - 5.40 M/uL    Hemoglobin 12.7 12.0 - 16.0 g/dL    " Hematocrit 37.1 37.0 - 47.0 %    MCV 90.9 81.4 - 97.8 fL    MCH 31.1 27.0 - 33.0 pg    MCHC 34.2 33.6 - 35.0 g/dL    RDW 51.1 (H) 35.9 - 50.0 fL    Platelet Count 179 164 - 446 K/uL    MPV 10.3 9.0 - 12.9 fL    Neutrophils-Polys 89.90 (H) 44.00 - 72.00 %    Lymphocytes 4.40 (L) 22.00 - 41.00 %    Monocytes 4.80 0.00 - 13.40 %    Eosinophils 0.00 0.00 - 6.90 %    Basophils 0.20 0.00 - 1.80 %    Immature Granulocytes 0.70 0.00 - 0.90 %    Nucleated RBC 0.00 /100 WBC    Neutrophils (Absolute) 12.98 (H) 2.00 - 7.15 K/uL    Lymphs (Absolute) 0.63 (L) 1.00 - 4.80 K/uL    Monos (Absolute) 0.69 0.00 - 0.85 K/uL    Eos (Absolute) 0.00 0.00 - 0.51 K/uL    Baso (Absolute) 0.03 0.00 - 0.12 K/uL    Immature Granulocytes (abs) 0.10 0.00 - 0.11 K/uL    NRBC (Absolute) 0.00 K/uL   COMP METABOLIC PANEL   Result Value Ref Range    Sodium 130 (L) 135 - 145 mmol/L    Potassium 4.5 3.6 - 5.5 mmol/L    Chloride 99 96 - 112 mmol/L    Co2 20 20 - 33 mmol/L    Anion Gap 11.0 7.0 - 16.0    Glucose 153 (H) 65 - 99 mg/dL    Bun 14 8 - 22 mg/dL    Creatinine 0.88 0.50 - 1.40 mg/dL    Calcium 8.5 8.5 - 10.5 mg/dL    AST(SGOT) 25 12 - 45 U/L    ALT(SGPT) 8 2 - 50 U/L    Alkaline Phosphatase 90 30 - 99 U/L    Total Bilirubin 0.3 0.1 - 1.5 mg/dL    Albumin 3.7 3.2 - 4.9 g/dL    Total Protein 6.6 6.0 - 8.2 g/dL    Globulin 2.9 1.9 - 3.5 g/dL    A-G Ratio 1.3 g/dL   LACTIC ACID   Result Value Ref Range    Lactic Acid 0.9 0.5 - 2.0 mmol/L   URINALYSIS (UA)    Specimen: Urine   Result Value Ref Range    Color Yellow     Character Clear     Specific Gravity 1.013 <1.035    Ph 6.0 5.0 - 8.0    Glucose Negative Negative mg/dL    Ketones Negative Negative mg/dL    Protein Negative Negative mg/dL    Bilirubin Negative Negative    Urobilinogen, Urine 0.2 Negative    Nitrite Negative Negative    Leukocyte Esterase Negative Negative    Occult Blood Small (A) Negative    Micro Urine Req Microscopic    ESTIMATED GFR   Result Value Ref Range    GFR If   American >60 >60 mL/min/1.73 m 2    GFR If Non African American >60 >60 mL/min/1.73 m 2   URINE MICROSCOPIC (W/UA)   Result Value Ref Range    WBC 0-2 /hpf    RBC 0-2 /hpf    Bacteria Negative None /hpf    Epithelial Cells Negative /hpf    Hyaline Cast 3-5 (A) /lpf       All labs reviewed by me.      COURSE & MEDICAL DECISION MAKING  Pertinent Labs & Imaging studies reviewed. (See chart for details)    9:16 PM - Patient seen and examined at bedside. I discussed that we will obtain labs and imaging to further evaluate for a cause of her symptoms.  Patient will be treated with tylenol 650 mg. Ordered CoV-2, Flu, RSV, CBC w/ diff, CMP, lactic acid, blood cultures, and UA to evaluate her symptoms. The differential diagnoses include but are not limited to: COVID, UTI, viral gastroenteritis, pneumonia, cellulitis, sepsis    Patient was attempted to be ambulated but felt lightheaded and had hypotension into the high 80s.  I came back and reevaluated the patient and found the area of cellulitis on her right hip I think this is a source of her sepsis.    Patient has been started on Rocephin IV.  She will be started on    Medical Decision Making: At this point time I think the patient has early sepsis secondary to cellulitis of her right buttocks.  With this she is still having some ever episodes of hypotension.  Her lactic acid is actually normal which is a good sign.  She has been started on IV antibiotics and I will rehydrate her.  She does have some mild hypoxia although she does not have any wheezing and no signs of pneumonia.  She does have a significant history of smoking which may be the cause of her hypoxia.     DISPOSITION:  Patient will be hospitalized by Dr. Scott in guarded condition.       FINAL IMPRESSION  1. Cellulitis of buttock    2. Sepsis without acute organ dysfunction, due to unspecified organism (HCC)    3. Hypotension, unspecified hypotension type    4. Hypoxia          I, Carin Bell (Dorita), am  scribing for, and in the presence of, Dick Wahl M.D.    Electronically signed by: Carin Bell (Scribe), 2/22/2022    IDick M.D. personally performed the services described in this documentation, as scribed by Carin Bell in my presence, and it is both accurate and complete.    The note accurately reflects work and decisions made by me.  Dick Wahl M.D.  2/23/2022  1:57 AM

## 2022-02-24 LAB
ALBUMIN SERPL BCP-MCNC: 3.5 G/DL (ref 3.2–4.9)
ALBUMIN SERPL BCP-MCNC: 3.6 G/DL (ref 3.2–4.9)
ALBUMIN/GLOB SERPL: 1.2 G/DL
ALBUMIN/GLOB SERPL: 1.3 G/DL
ALP SERPL-CCNC: 88 U/L (ref 30–99)
ALP SERPL-CCNC: 89 U/L (ref 30–99)
ALT SERPL-CCNC: 5 U/L (ref 2–50)
ALT SERPL-CCNC: 5 U/L (ref 2–50)
ANION GAP SERPL CALC-SCNC: 10 MMOL/L (ref 7–16)
ANION GAP SERPL CALC-SCNC: 9 MMOL/L (ref 7–16)
AST SERPL-CCNC: 17 U/L (ref 12–45)
AST SERPL-CCNC: 20 U/L (ref 12–45)
BASOPHILS # BLD AUTO: 0.4 % (ref 0–1.8)
BASOPHILS # BLD AUTO: 0.4 % (ref 0–1.8)
BASOPHILS # BLD: 0.02 K/UL (ref 0–0.12)
BASOPHILS # BLD: 0.02 K/UL (ref 0–0.12)
BILIRUB SERPL-MCNC: 0.2 MG/DL (ref 0.1–1.5)
BILIRUB SERPL-MCNC: <0.2 MG/DL (ref 0.1–1.5)
BUN SERPL-MCNC: 13 MG/DL (ref 8–22)
BUN SERPL-MCNC: 16 MG/DL (ref 8–22)
CALCIUM SERPL-MCNC: 8.8 MG/DL (ref 8.5–10.5)
CALCIUM SERPL-MCNC: 9.2 MG/DL (ref 8.5–10.5)
CHLORIDE SERPL-SCNC: 104 MMOL/L (ref 96–112)
CHLORIDE SERPL-SCNC: 105 MMOL/L (ref 96–112)
CO2 SERPL-SCNC: 25 MMOL/L (ref 20–33)
CO2 SERPL-SCNC: 26 MMOL/L (ref 20–33)
CREAT SERPL-MCNC: 0.83 MG/DL (ref 0.5–1.4)
CREAT SERPL-MCNC: 0.84 MG/DL (ref 0.5–1.4)
EOSINOPHIL # BLD AUTO: 0.05 K/UL (ref 0–0.51)
EOSINOPHIL # BLD AUTO: 0.08 K/UL (ref 0–0.51)
EOSINOPHIL NFR BLD: 1 % (ref 0–6.9)
EOSINOPHIL NFR BLD: 1.4 % (ref 0–6.9)
ERYTHROCYTE [DISTWIDTH] IN BLOOD BY AUTOMATED COUNT: 52.2 FL (ref 35.9–50)
ERYTHROCYTE [DISTWIDTH] IN BLOOD BY AUTOMATED COUNT: 53.2 FL (ref 35.9–50)
EST. AVERAGE GLUCOSE BLD GHB EST-MCNC: 120 MG/DL
EST. AVERAGE GLUCOSE BLD GHB EST-MCNC: 123 MG/DL
GLOBULIN SER CALC-MCNC: 2.8 G/DL (ref 1.9–3.5)
GLOBULIN SER CALC-MCNC: 2.9 G/DL (ref 1.9–3.5)
GLUCOSE SERPL-MCNC: 104 MG/DL (ref 65–99)
GLUCOSE SERPL-MCNC: 112 MG/DL (ref 65–99)
HBA1C MFR BLD: 5.8 % (ref 4–5.6)
HBA1C MFR BLD: 5.9 % (ref 4–5.6)
HCT VFR BLD AUTO: 37 % (ref 37–47)
HCT VFR BLD AUTO: 38.1 % (ref 37–47)
HGB BLD-MCNC: 12.4 G/DL (ref 12–16)
HGB BLD-MCNC: 12.6 G/DL (ref 12–16)
HIV 1+2 AB+HIV1 P24 AG SERPL QL IA: NORMAL
HIV 1+2 AB+HIV1 P24 AG SERPL QL IA: NORMAL
IMM GRANULOCYTES # BLD AUTO: 0.03 K/UL (ref 0–0.11)
IMM GRANULOCYTES # BLD AUTO: 0.04 K/UL (ref 0–0.11)
IMM GRANULOCYTES NFR BLD AUTO: 0.5 % (ref 0–0.9)
IMM GRANULOCYTES NFR BLD AUTO: 0.8 % (ref 0–0.9)
LYMPHOCYTES # BLD AUTO: 0.84 K/UL (ref 1–4.8)
LYMPHOCYTES # BLD AUTO: 0.93 K/UL (ref 1–4.8)
LYMPHOCYTES NFR BLD: 16.6 % (ref 22–41)
LYMPHOCYTES NFR BLD: 17.4 % (ref 22–41)
MCH RBC QN AUTO: 30.2 PG (ref 27–33)
MCH RBC QN AUTO: 31.3 PG (ref 27–33)
MCHC RBC AUTO-ENTMCNC: 32.5 G/DL (ref 33.6–35)
MCHC RBC AUTO-ENTMCNC: 34.1 G/DL (ref 33.6–35)
MCV RBC AUTO: 92 FL (ref 81.4–97.8)
MCV RBC AUTO: 92.7 FL (ref 81.4–97.8)
MONOCYTES # BLD AUTO: 0.61 K/UL (ref 0–0.85)
MONOCYTES # BLD AUTO: 0.63 K/UL (ref 0–0.85)
MONOCYTES NFR BLD AUTO: 10.9 % (ref 0–13.4)
MONOCYTES NFR BLD AUTO: 13 % (ref 0–13.4)
NEUTROPHILS # BLD AUTO: 3.26 K/UL (ref 2–7.15)
NEUTROPHILS # BLD AUTO: 3.92 K/UL (ref 2–7.15)
NEUTROPHILS NFR BLD: 67.4 % (ref 44–72)
NEUTROPHILS NFR BLD: 70.2 % (ref 44–72)
NRBC # BLD AUTO: 0 K/UL
NRBC # BLD AUTO: 0 K/UL
NRBC BLD-RTO: 0 /100 WBC
NRBC BLD-RTO: 0 /100 WBC
PLATELET # BLD AUTO: 192 K/UL (ref 164–446)
PLATELET # BLD AUTO: 207 K/UL (ref 164–446)
PMV BLD AUTO: 10.3 FL (ref 9–12.9)
PMV BLD AUTO: 10.5 FL (ref 9–12.9)
POTASSIUM SERPL-SCNC: 4.1 MMOL/L (ref 3.6–5.5)
POTASSIUM SERPL-SCNC: 4.2 MMOL/L (ref 3.6–5.5)
PROT SERPL-MCNC: 6.3 G/DL (ref 6–8.2)
PROT SERPL-MCNC: 6.5 G/DL (ref 6–8.2)
RBC # BLD AUTO: 4.02 M/UL (ref 4.2–5.4)
RBC # BLD AUTO: 4.11 M/UL (ref 4.2–5.4)
SODIUM SERPL-SCNC: 139 MMOL/L (ref 135–145)
SODIUM SERPL-SCNC: 140 MMOL/L (ref 135–145)
WBC # BLD AUTO: 4.8 K/UL (ref 4.8–10.8)
WBC # BLD AUTO: 5.6 K/UL (ref 4.8–10.8)

## 2022-02-24 PROCEDURE — G0475 HIV COMBINATION ASSAY: HCPCS

## 2022-02-24 PROCEDURE — 700102 HCHG RX REV CODE 250 W/ 637 OVERRIDE(OP): Performed by: HOSPITALIST

## 2022-02-24 PROCEDURE — 96375 TX/PRO/DX INJ NEW DRUG ADDON: CPT

## 2022-02-24 PROCEDURE — 700101 HCHG RX REV CODE 250: Performed by: INTERNAL MEDICINE

## 2022-02-24 PROCEDURE — 80053 COMPREHEN METABOLIC PANEL: CPT

## 2022-02-24 PROCEDURE — 96376 TX/PRO/DX INJ SAME DRUG ADON: CPT

## 2022-02-24 PROCEDURE — 700111 HCHG RX REV CODE 636 W/ 250 OVERRIDE (IP): Performed by: INTERNAL MEDICINE

## 2022-02-24 PROCEDURE — 700102 HCHG RX REV CODE 250 W/ 637 OVERRIDE(OP): Performed by: INTERNAL MEDICINE

## 2022-02-24 PROCEDURE — 83036 HEMOGLOBIN GLYCOSYLATED A1C: CPT

## 2022-02-24 PROCEDURE — A9270 NON-COVERED ITEM OR SERVICE: HCPCS | Performed by: INTERNAL MEDICINE

## 2022-02-24 PROCEDURE — 85025 COMPLETE CBC W/AUTO DIFF WBC: CPT

## 2022-02-24 PROCEDURE — A9270 NON-COVERED ITEM OR SERVICE: HCPCS | Performed by: HOSPITALIST

## 2022-02-24 PROCEDURE — 770001 HCHG ROOM/CARE - MED/SURG/GYN PRIV*

## 2022-02-24 PROCEDURE — 99233 SBSQ HOSP IP/OBS HIGH 50: CPT | Performed by: HOSPITALIST

## 2022-02-24 RX ORDER — ASENAPINE 10 MG/1
10 TABLET SUBLINGUAL 2 TIMES DAILY
Status: DISCONTINUED | OUTPATIENT
Start: 2022-02-24 | End: 2022-02-25 | Stop reason: HOSPADM

## 2022-02-24 RX ORDER — TRIAMCINOLONE ACETONIDE 1 MG/G
CREAM TOPICAL
Status: DISCONTINUED | OUTPATIENT
Start: 2022-02-24 | End: 2022-02-25 | Stop reason: HOSPADM

## 2022-02-24 RX ADMIN — CLOZAPINE 200 MG: 100 TABLET ORAL at 05:58

## 2022-02-24 RX ADMIN — WATER 2 G: 100 INJECTION, SOLUTION INTRAVENOUS at 15:09

## 2022-02-24 RX ADMIN — POLYETHYLENE GLYCOL 3350 1 PACKET: 17 POWDER, FOR SOLUTION ORAL at 05:56

## 2022-02-24 RX ADMIN — ASENAPINE MALEATE 10 MG: 10 TABLET SUBLINGUAL at 18:00

## 2022-02-24 RX ADMIN — SENNOSIDES AND DOCUSATE SODIUM 2 TABLET: 50; 8.6 TABLET ORAL at 17:17

## 2022-02-24 RX ADMIN — POLYETHYLENE GLYCOL 3350 1 PACKET: 17 POWDER, FOR SOLUTION ORAL at 17:17

## 2022-02-24 RX ADMIN — CLOZAPINE 400 MG: 100 TABLET ORAL at 17:18

## 2022-02-24 RX ADMIN — SENNOSIDES AND DOCUSATE SODIUM 2 TABLET: 50; 8.6 TABLET ORAL at 05:56

## 2022-02-24 RX ADMIN — WATER 2 G: 100 INJECTION, SOLUTION INTRAVENOUS at 05:56

## 2022-02-24 RX ADMIN — WATER 2 G: 100 INJECTION, SOLUTION INTRAVENOUS at 20:59

## 2022-02-24 RX ADMIN — Medication 1 TABLET: at 05:55

## 2022-02-24 ASSESSMENT — ENCOUNTER SYMPTOMS
FEVER: 1
CONSTIPATION: 1
HALLUCINATIONS: 1

## 2022-02-24 ASSESSMENT — PAIN DESCRIPTION - PAIN TYPE
TYPE: ACUTE PAIN

## 2022-02-24 NOTE — CARE PLAN
The patient is Stable - Low risk of patient condition declining or worsening    Shift Goals  Clinical Goals: monitor skin integrity of R sacrum/ hip  Patient Goals: rest  Family Goals: NA    Progress made toward(s) clinical / shift goals:  pt educated on poc. Pt denies anxiety or pain. Pt uses call light appropriately and communicated needs.       Problem: Knowledge Deficit - Standard  Goal: Patient and family/care givers will demonstrate understanding of plan of care, disease process/condition, diagnostic tests and medications  Outcome: Progressing     Problem: Psychosocial  Goal: Patient's level of anxiety will decrease  Outcome: Progressing     Problem: Communication  Goal: The ability to communicate needs accurately and effectively will improve  Outcome: Progressing       Patient is not progressing towards the following goals:

## 2022-02-24 NOTE — PROGRESS NOTES
Educated pt on need for LR fluids. Pt refuses LR fluids despite education. Pt states she rather drink water. Will try again in the morning.

## 2022-02-24 NOTE — PROGRESS NOTES
Report received from previous shift. Assumed care of patient at 1900, patient is A&O x4. Patient is a low Fall risk, bed locked and in lowest position, bed alarm on. Patient reports pain 0/10. Plan of care reviewed with patient, will implement and continue to monitor. Hourly rounding is in place and call light/belongings within reach.

## 2022-02-24 NOTE — PROGRESS NOTES
Attempted to call caregiver Radha to bring in patient's home med per pharmacy. No answer, left message.    1400: received call back from Radha, confirmed she would be able to drop off med for patient soon.     1448: Radha at bedside, gave RN patient's medication as requested. Box of 38 tabs of Asenapine Maleate was dropped of. Notified pharmacy to verify for dispensing.

## 2022-02-24 NOTE — PROGRESS NOTES
4 Eyes Skin Assessment Completed by RAMEZ Echevarria and RAMEZ Llanos.    Head WDL  Ears Redness  Nose WDL  Mouth WDL  Neck WDL  Breast/Chest WDL  Shoulder Blades WDL  Spine WDL  (R) Arm/Elbow/Hand WDL  (L) Arm/Elbow/Hand WDL  Abdomen WDL  Groin WDL  Scrotum/Coccyx/Buttocks Redness, Cellulitis  (R) Leg WDL  (L) Leg WDL  (R) Heel/Foot/Toe WDL  (L) Heel/Foot/Toe WDL          Devices In Places      Interventions In Place N/A    Possible Skin Injury Yes; Cellulitis    Pictures Uploaded Into Epic Yes  Wound Consult Placed No  RN Wound Prevention Protocol Ordered No

## 2022-02-24 NOTE — PROGRESS NOTES
Assumed care of patient at approx. 1615 from ED. Patient is A&Ox 4, states pain level is 0/10. Bed locked in lowest position with 2 rails up. Call light in place, belongings at bedside. Patient expresses zero needs at this time and hourly rounding is in place. Bed alarm in place. Agree with previous RN assessment.

## 2022-02-24 NOTE — WOUND TEAM
[facility]  Inpatient Services  Wound and Skin Care Brief Evaluation    Admission Date: 2/22/2022     Last order of IP CONSULT TO WOUND CARE was found on 2/23/2022 from Hospital Encounter on 2/22/2022     HPI, PMH, SH: Reviewed    Chief Complaint   Patient presents with   • Weakness     Pt [facility] from a group home for generalized weakness since noon today. Pt reports having aches and chills for the past week. Axox4, stomach slightly distended.      Diagnosis: Cellulitis [L03.90]    Unit where seen by Wound Team: S534/02     Wound consult placed regarding R hip. Chart and images reviewed. This discussed with bedside RN Wolf. This RN in to assess patient. Pt pleasant and agreeable. Warm erythema to R hip, decreased in size since picture was taken. PRN kenalog cream ordered for itching. Recommend continuing ABX & keeping area dry (no diapers). Updated Dr. Gann. No pressure injuries or advanced wound care needs identified. Wound consult completed. No further follow up unless indicated and consulted.     RSKIN:   CURRENTLY IN PLACE (X), APPLIED THIS VISIT (A), ORDERED (O):   Q shift Guillermo:  X  Q shift pressure point assessments:  X    Surface/Positioning   Pressure redistribution mattress          X  Low Airloss          Bariatric foam      Bariatric STEVEN     Waffle cushion        Waffle Overlay          Reposition q 2 hours      TAPs Turning system     Z Manolo Pillow     Offloading/Redistribution N/A  Sacral Mepilex (Silicone dressing)     Heel Mepilex (Silicone dressing)         Heel float boots (Prevalon boot)             Float Heels off Bed with Pillows           Respiratory N/A  Silicone O2 tubing         Gray Foam Ear protectors     Cannula fixation Device (Tender )          High flow offloading Clip    Elastic head band offloading device      Anchorfast                                                         Trach with Optifoam split foam             Containment/Moisture Prevention N/A     Rectal tube or BMS    Purwick/Condom Cath        Eaton Catheter    Barrier wipes           Barrier paste       Antifungal tx      Interdry        Mobilization N/A      Up to chair        Ambulate      PT/OT      Nutrition       Dietician        Diabetes Education      PO   X  TF     TPN     NPO   # days     Other

## 2022-02-24 NOTE — CARE PLAN
The patient is Stable - Low risk of patient condition declining or worsening         Progress made toward(s) clinical / shift goals:      Patient is not progressing towards the following goals: Oriented pt to room and new unit. Pt resting comfortably in bed, no complaints of pain. Pt interested in seeing a  on Sunday if still here.      Problem: Knowledge Deficit - Standard  Goal: Patient and family/care givers will demonstrate understanding of plan of care, disease process/condition, diagnostic tests and medications  Outcome: Not Progressing     Problem: Psychosocial  Goal: Spiritual and cultural needs incorporated into hospitalization  Outcome: Not Progressing

## 2022-02-24 NOTE — PROGRESS NOTES
Assumed care of pt at 0700. Report received by NOC RN. Pt is A&O x 4. Pain is 0. Pt is sitting up in bed. No needs at this time. Bed in lowest locked position, call light in reach, hourly rounding in place. Labs reviewed, orders reviewed, communication board updated. WCTM.

## 2022-02-25 ENCOUNTER — PHARMACY VISIT (OUTPATIENT)
Dept: PHARMACY | Facility: MEDICAL CENTER | Age: 67
End: 2022-02-25
Payer: MEDICARE

## 2022-02-25 VITALS
TEMPERATURE: 98.2 F | HEIGHT: 63 IN | BODY MASS INDEX: 27.46 KG/M2 | HEART RATE: 95 BPM | RESPIRATION RATE: 17 BRPM | WEIGHT: 155 LBS | OXYGEN SATURATION: 91 % | DIASTOLIC BLOOD PRESSURE: 72 MMHG | SYSTOLIC BLOOD PRESSURE: 135 MMHG

## 2022-02-25 PROBLEM — A41.9 SEPSIS (HCC): Status: RESOLVED | Noted: 2022-02-23 | Resolved: 2022-02-25

## 2022-02-25 PROCEDURE — A9270 NON-COVERED ITEM OR SERVICE: HCPCS | Performed by: HOSPITALIST

## 2022-02-25 PROCEDURE — 99239 HOSP IP/OBS DSCHRG MGMT >30: CPT | Performed by: HOSPITALIST

## 2022-02-25 PROCEDURE — 700111 HCHG RX REV CODE 636 W/ 250 OVERRIDE (IP): Performed by: INTERNAL MEDICINE

## 2022-02-25 PROCEDURE — 700102 HCHG RX REV CODE 250 W/ 637 OVERRIDE(OP): Performed by: INTERNAL MEDICINE

## 2022-02-25 PROCEDURE — 700102 HCHG RX REV CODE 250 W/ 637 OVERRIDE(OP): Performed by: HOSPITALIST

## 2022-02-25 PROCEDURE — RXMED WILLOW AMBULATORY MEDICATION CHARGE: Performed by: HOSPITALIST

## 2022-02-25 PROCEDURE — 700111 HCHG RX REV CODE 636 W/ 250 OVERRIDE (IP): Performed by: HOSPITALIST

## 2022-02-25 PROCEDURE — A9270 NON-COVERED ITEM OR SERVICE: HCPCS | Performed by: INTERNAL MEDICINE

## 2022-02-25 PROCEDURE — 700101 HCHG RX REV CODE 250: Performed by: INTERNAL MEDICINE

## 2022-02-25 RX ORDER — SULFAMETHOXAZOLE AND TRIMETHOPRIM 800; 160 MG/1; MG/1
1 TABLET ORAL 2 TIMES DAILY
Qty: 10 TABLET | Refills: 0 | Status: SHIPPED | OUTPATIENT
Start: 2022-02-25 | End: 2022-03-02

## 2022-02-25 RX ORDER — TRIAMCINOLONE ACETONIDE 1 MG/G
1 CREAM TOPICAL 3 TIMES DAILY PRN
Qty: 15 G | Refills: 0 | Status: SHIPPED | OUTPATIENT
Start: 2022-02-25 | End: 2022-03-04

## 2022-02-25 RX ADMIN — Medication 1 TABLET: at 05:42

## 2022-02-25 RX ADMIN — ENOXAPARIN SODIUM 40 MG: 40 INJECTION SUBCUTANEOUS at 05:49

## 2022-02-25 RX ADMIN — SENNOSIDES AND DOCUSATE SODIUM 2 TABLET: 50; 8.6 TABLET ORAL at 05:41

## 2022-02-25 RX ADMIN — CLOZAPINE 200 MG: 100 TABLET ORAL at 05:41

## 2022-02-25 RX ADMIN — ASENAPINE MALEATE 10 MG: 10 TABLET SUBLINGUAL at 05:43

## 2022-02-25 RX ADMIN — WATER 2 G: 100 INJECTION, SOLUTION INTRAVENOUS at 13:52

## 2022-02-25 RX ADMIN — WATER 2 G: 100 INJECTION, SOLUTION INTRAVENOUS at 05:44

## 2022-02-25 ASSESSMENT — PAIN DESCRIPTION - PAIN TYPE: TYPE: ACUTE PAIN

## 2022-02-25 NOTE — CARE PLAN
Problem: Knowledge Deficit - Standard  Goal: Patient and family/care givers will demonstrate understanding of plan of care, disease process/condition, diagnostic tests and medications  Outcome: Progressing     Problem: Psychosocial  Goal: Patient's level of anxiety will decrease  Outcome: Progressing     Problem: Communication  Goal: The ability to communicate needs accurately and effectively will improve  Outcome: Progressing     The patient is Stable - Low risk of patient condition declining or worsening    Shift Goals  Clinical Goals: monitor cellulitis, administer abx  Patient Goals: rest  Family Goals: NA    Progress made toward(s) clinical / shift goals: patient's leg seems to be improving, cream ordered PRN, abx administered per MAR, patient able to rest    Patient is not progressing towards the following goals:

## 2022-02-25 NOTE — PROGRESS NOTES
Assume care of pt at 1900. Report received from day RN. Pt is A/O x4. Pain is denied. Pt is resting in bed. Bed in lowest and locked position, call light in reach, hourly rounding in place. Bed alarm in place. Labs reviewed. Communication board updated. Will continue to monitor.

## 2022-02-25 NOTE — DISCHARGE SUMMARY
"Discharge Summary    CHIEF COMPLAINT ON ADMISSION  Chief Complaint   Patient presents with   • Weakness     Pt BIB [facility] from a group home for generalized weakness since noon today. Pt reports having aches and chills for the past week. Axox4, stomach slightly distended.        Reason for Admission  ems     Admission Date  2/22/2022    CODE STATUS  Full Code    HPI & HOSPITAL COURSE  For full details of admission please see the H&P of Dr. Jose Scott dated 2/23/2022, briefly, \"Gena Dykes is a 66 y.o. female who presented 2/22/2022 with past medical history of breast cancer, anal cancer, schizophrenia who comes into the emergency room with complaints of generalized weakness for 1 day.  Patient states that she tried to get up today and walk but was unable to.  It was associated with dizziness and lightheadedness.  Patient also states that she has fevers and chills.  She denies any back pain, sciatica, photophobia, or LE numbness. She denies any nausea, vomiting, diarrhea, abdominal pain, chest pain, sore throat, runny nose, facial pain, burning micturition, joint aches, and cough.  Patient does have cellulitis of the right buttocks region present for the past week.  She denies any drainage or open wound in the area.  Patient states that she puts cream and lotion on this but this is not something that she started recently.\"    Patient's wound and cellulitis improved dramatically with parenteral antibiotics and supportive measures.  Patient was seen by the wound treatment team on hospital day 1.  Antibiotics were adjusted, topical emollients added.  On hospital day 2 wound left nearly gone, I did feel she would continue to improve to the point of resolution with just oral antibiotics moving forward. Therefore, she is discharged in good and stable condition to home with close outpatient follow-up.    The patient met 2-midnight criteria for an inpatient stay at the time of discharge.    Discharge " Date  02/25/22      FOLLOW UP ITEMS POST DISCHARGE  None    DISCHARGE DIAGNOSES  Principal Problem:    Cellulitis POA: Yes  Active Problems:    Paranoid schizophrenia (HCC) POA: Yes  Resolved Problems:    Sepsis (HCC) POA: Unknown      FOLLOW UP  Future Appointments   Date Time Provider Department Center   6/7/2022  1:00 PM [facility]     No follow-up provider specified.    MEDICATIONS ON DISCHARGE     Medication List      START taking these medications      Instructions   sulfamethoxazole-trimethoprim 800-160 MG tablet  Commonly known as: BACTRIM DS   Take 1 Tablet by mouth 2 times a day for 5 days.  Dose: 1 Tablet     triamcinolone acetonide 0.1 % Crea  Commonly known as: KENALOG   Apply to affected area 3 times a day as needed (Itching) for up to 7 days.  Dose: 1 oz        CONTINUE taking these medications      Instructions   Asenapine Maleate 10 MG Subl   Place  under the tongue. Place 1 tab under tongue twice a day     Calcium Carbonate+Vitamin D 600-200 MG-UNIT Tabs  Commonly known as: Calcium 600 + D   Take 1 Tablet by mouth every day.  Dose: 1 Tablet     cloZAPine 100 MG Tabs  Commonly known as: CLOZARIL   Take 100 mg by mouth 2 times a day. 200MG in Am and 300MG HS  Indications: two tabs every morning and 4 tabs every bedtime  Dose: 100 mg     Fish Oil Concentrate 300 MG Caps   Take  by mouth.     polyethylene glycol/lytes 17 g Pack  Commonly known as: MIRALAX   Take 17 g by mouth every bedtime.  Dose: 17 g     Stool Softener 240 MG Caps  Generic drug: docusate calcium   Take 1 Capsule by mouth every day.  Dose: 240 mg     therapeutic multivitamin-minerals Tabs   Take 1 Tab by mouth every day.  Dose: 1 Tablet            Allergies  Allergies   Allergen Reactions   • Azithromycin    • Fluphenazine      hallucinations   • Haldol [Haloperidol]    • Erythromycin      Pt on Clozapine/no erythromycin   • Haldol [Haloperidol Lactate]      Does not tolerate       DIET  Orders Placed This  Encounter   Procedures   • Diet Order Diet: Regular     Standing Status:   Standing     Number of Occurrences:   1     Order Specific Question:   Diet:     Answer:   Regular [1]       ACTIVITY  As tolerated.  Weight bearing as tolerated    CONSULTATIONS  None    PROCEDURES  None    RADIOLOGY  DX-CHEST-PORTABLE (1 VIEW)   Final Result         1.  No acute cardiopulmonary disease.   2.  Atherosclerosis            LABORATORY  Lab Results   Component Value Date    SODIUM 139 02/24/2022    POTASSIUM 4.1 02/24/2022    CHLORIDE 104 02/24/2022    CO2 25 02/24/2022    GLUCOSE 104 (H) 02/24/2022    BUN 13 02/24/2022    CREATININE 0.83 02/24/2022        Lab Results   Component Value Date    WBC 4.8 02/24/2022    HEMOGLOBIN 12.4 02/24/2022    HEMATOCRIT 38.1 02/24/2022    PLATELETCT 207 02/24/2022        Total time of the discharge process exceeds 31 minutes.  Face-to-face encounter held on the day of discharge.

## 2022-02-25 NOTE — DISCHARGE INSTRUCTIONS
Discharge Instructions    Discharged to home by car with relative. Discharged via wheelchair, hospital escort: Yes.  Special equipment needed: Not Applicable    Be sure to schedule a follow-up appointment with your primary care doctor or any specialists as instructed.     Discharge Plan:   Diet Plan: Discussed  Activity Level: Discussed  Confirmed Follow up Appointment: Patient to Call and Schedule Appointment  Confirmed Symptoms Management: Discussed  Medication Reconciliation Updated: Yes  Influenza Vaccine Indication: Not indicated: Previously immunized this influenza season and > 8 years of age (Unknown of date.)    I understand that a diet low in cholesterol, fat, and sodium is recommended for good health. Unless I have been given specific instructions below for another diet, I accept this instruction as my diet prescription.   Other diet: Heart healthy    Special Instructions: None    · Is patient discharged on Warfarin / Coumadin?   No     Depression / Suicide Risk    As you are discharged from this [facility] facility, it is important to learn how to keep safe from harming yourself.    Recognize the warning signs:  · Abrupt changes in personality, positive or negative- including increase in energy   · Giving away possessions  · Change in eating patterns- significant weight changes-  positive or negative  · Change in sleeping patterns- unable to sleep or sleeping all the time   · Unwillingness or inability to communicate  · Depression  · Unusual sadness, discouragement and loneliness  · Talk of wanting to die  · Neglect of personal appearance   · Rebelliousness- reckless behavior  · Withdrawal from people/activities they love  · Confusion- inability to concentrate     If you or a loved one observes any of these behaviors or has concerns about self-harm, here's what you can do:  · Talk about it- your feelings and reasons for harming yourself  · Remove any means that you might use to hurt yourself (examples:  pills, rope, extension cords, firearm)  · Get professional help from the community (Mental Health, Substance Abuse, psychological counseling)  · Do not be alone:Call your Safe Contact- someone whom you trust who will be there for you.  · Call your local CRISIS HOTLINE 409-4395 or 981-133-5152  · Call your local Children's Mobile Crisis Response Team [place] (573) 789-4900 or www."[employer]"  · Call the toll free National Suicide Prevention Hotlines   · National Suicide Prevention Lifeline 373-268-VPBH (2787)  · National Hope Line Network 800-SUICIDE (144-0563)

## 2022-02-25 NOTE — CARE PLAN
The patient is Stable - Low risk of patient condition declining or worsening    Shift Goals  Clinical Goals: administer ABX for cellulitis      Progress made toward(s) clinical / shift goals:  Pt remains on IV ABX for cellulitis. Redness decreasing on right thigh, c/o itchiness but refused PRN cream medication.     Patient is not progressing towards the following goals:

## 2022-02-25 NOTE — PROGRESS NOTES
Hospital Medicine Daily Progress Note    Date of Service  2/24/2022    Chief Complaint  Gena Dykse is a 66 y.o. female admitted 2/22/2022 with weakness and rash    Hospital Course  No notes on file    Interval Problem Update  - admitted yesterday  - denies any pain around buttocks lesion/rash, endorses itching.      I have personally seen and examined the patient at bedside. I discussed the plan of care with patient and bedside RN.    Consultants/Specialty  None    Code Status  Full Code    Disposition  Patient is not medically cleared for discharge.   Anticipate discharge to to home with close outpatient follow-up.  I have placed the appropriate orders for post-discharge needs.    Review of Systems  Review of Systems   Constitutional: Positive for fever.   Gastrointestinal: Positive for constipation.   Skin: Positive for rash.   Psychiatric/Behavioral: Positive for hallucinations.        Physical Exam  Temp:  [36.8 °C (98.2 °F)-37.4 °C (99.4 °F)] 37.1 °C (98.8 °F)  Pulse:  [88-99] 88  Resp:  [17-18] 18  BP: (105-139)/(50-77) 114/62  SpO2:  [91 %-93 %] 93 %    General: No acute distress  HEENT atraumatic, normocephalic, pupils equal round reactive to light  Neck: No JVD  Chest: Respirations are unlabored  Cardiac: Physiologic S1 and S2  Abdomen: Soft, nontender, nondistended  Extremities: Without clubbing, cyanosis or edema  Skin: Area of erythema to the right buttock has receded slightly from inked margins, appears pinkish, improved from compared to admission photographs.  Will monitor in additional 24 hours, anticipate discharge back to group home on oral antibiotics in the morning if continued improvement.  Neuro: Cranial nerves II through XII are grossly intact.  Psych: No anxiety, judgement intact.            Fluids    Intake/Output Summary (Last 24 hours) at 2/24/2022 1601  Last data filed at 2/24/2022 1343  Gross per 24 hour   Intake 805 ml   Output 100 ml   Net 705 ml       Laboratory  Recent Labs      02/22/22 2208 02/24/22 0052 02/24/22  0746   WBC 14.4* 5.6 4.8   RBC 4.08* 4.02* 4.11*   HEMOGLOBIN 12.7 12.6 12.4   HEMATOCRIT 37.1 37.0 38.1   MCV 90.9 92.0 92.7   MCH 31.1 31.3 30.2   MCHC 34.2 34.1 32.5*   RDW 51.1* 52.2* 53.2*   PLATELETCT 179 192 207   MPV 10.3 10.5 10.3     Recent Labs     02/22/22 2208 02/24/22 0052 02/24/22  0746   SODIUM 130* 140 139   POTASSIUM 4.5 4.2 4.1   CHLORIDE 99 105 104   CO2 20 26 25   GLUCOSE 153* 112* 104*   BUN 14 16 13   CREATININE 0.88 0.84 0.83   CALCIUM 8.5 8.8 9.2                   Imaging  DX-CHEST-PORTABLE (1 VIEW)   Final Result         1.  No acute cardiopulmonary disease.   2.  Atherosclerosis           Assessment/Plan  * Cellulitis- (present on admission)  Assessment & Plan  R buttock rash  Fevers, elevated WBC, high CRP, however negative procal  Without any evidence of drainage or ope wound  Check hba1c and HIV  Start ceftriaxone      Sepsis (HCC)  Assessment & Plan  This is Sepsis Present on admission  SIRS criteria identified on my evaluation include: Tachycardia, with heart rate greater than 90 BPM and Leukocytosis, with WBC greater than 12,000  Source is right buttock cellulitis   Sepsis protocol initiated  Fluid resuscitation ordered per protocol  IV antibiotics as appropriate for source of sepsis  While organ dysfunction may be noted elsewhere in this problem list or in the chart, degree of organ dysfunction does not meet CMS criteria for severe sepsis          Paranoid schizophrenia (HCC)- (present on admission)  Assessment & Plan  Restart home medications       VTE prophylaxis: enoxaparin ppx    I have performed a physical exam and reviewed and updated ROS and Plan today (2/24/2022). In review of yesterday's note (2/23/2022), there are no changes except as documented above.

## 2022-02-25 NOTE — DISCHARGE PLANNING
Anticipated Discharge Disposition:     Action: LSW met with Pt and discussed d/c plan. Pt is agreeable t d/cing back to  and would like to be updated on the time. Per Pt, Radha on her facesheet is the contact fr the .     LSW called Radha with Pt's  and was told that they can come  Pt today at 3pm. The will need any new medications delivered by meds to beds and a copy of Pt's d/c summary. Radha would like to be called to confirm the meds can be delivered bedside.     Barriers to Discharge: Meds to Beds.     Plan: RN to send Pt to D/c lounge. Meds to Beds to deliver medications. Pt's Caregiver Radha to  at 3pm.

## 2022-02-25 NOTE — DISCHARGE PLANNING
Meds-to-Beds: Discharge prescription orders listed below delivered to patient's bedside. RN notified. Patient counseled. Patient elected to have co-payment billed to patient account.       Current Outpatient Medications   Medication Sig Dispense Refill   • triamcinolone acetonide (KENALOG) 0.1 % Cream Apply to affected area 3 times a day as needed (Itching) for up to 7 days. 15 g 0   • sulfamethoxazole-trimethoprim (BACTRIM DS) 800-160 MG tablet Take 1 Tablet by mouth 2 times a day for 5 days. 10 Tablet 0      Jelena Crum, PharmD

## 2022-02-28 LAB
BACTERIA BLD CULT: NORMAL
BACTERIA BLD CULT: NORMAL
SIGNIFICANT IND 70042: NORMAL
SIGNIFICANT IND 70042: NORMAL
SITE SITE: NORMAL
SITE SITE: NORMAL
SOURCE SOURCE: NORMAL
SOURCE SOURCE: NORMAL

## 2022-02-28 RX ORDER — DOCUSATE CALCIUM 240 MG
240 CAPSULE ORAL DAILY
Qty: 90 CAPSULE | Refills: 1 | Status: SHIPPED | OUTPATIENT
Start: 2022-02-28 | End: 2022-10-03

## 2022-05-17 ENCOUNTER — NON-PROVIDER VISIT (OUTPATIENT)
Dept: OCCUPATIONAL MEDICINE | Facility: CLINIC | Age: 67
End: 2022-05-17

## 2022-05-17 DIAGNOSIS — Z11.1 ENCOUNTER FOR PPD TEST: ICD-10-CM

## 2022-05-17 PROCEDURE — 86580 TB INTRADERMAL TEST: CPT | Performed by: NURSE PRACTITIONER

## 2022-05-19 ENCOUNTER — NON-PROVIDER VISIT (OUTPATIENT)
Dept: OCCUPATIONAL MEDICINE | Facility: CLINIC | Age: 67
End: 2022-05-19

## 2022-05-19 LAB — TB WHEAL 3D P 5 TU DIAM: NORMAL MM

## 2022-08-18 ENCOUNTER — HOSPITAL ENCOUNTER (INPATIENT)
Facility: MEDICAL CENTER | Age: 67
LOS: 2 days | DRG: 281 | End: 2022-08-20
Attending: EMERGENCY MEDICINE | Admitting: STUDENT IN AN ORGANIZED HEALTH CARE EDUCATION/TRAINING PROGRAM
Payer: MEDICARE

## 2022-08-18 ENCOUNTER — APPOINTMENT (OUTPATIENT)
Dept: RADIOLOGY | Facility: MEDICAL CENTER | Age: 67
DRG: 281 | End: 2022-08-18
Attending: EMERGENCY MEDICINE
Payer: MEDICARE

## 2022-08-18 ENCOUNTER — APPOINTMENT (OUTPATIENT)
Dept: CARDIOLOGY | Facility: MEDICAL CENTER | Age: 67
DRG: 281 | End: 2022-08-18
Attending: INTERNAL MEDICINE
Payer: MEDICARE

## 2022-08-18 DIAGNOSIS — K21.9 GASTROESOPHAGEAL REFLUX DISEASE, UNSPECIFIED WHETHER ESOPHAGITIS PRESENT: ICD-10-CM

## 2022-08-18 DIAGNOSIS — I21.3 ST ELEVATION MYOCARDIAL INFARCTION (STEMI), UNSPECIFIED ARTERY (HCC): ICD-10-CM

## 2022-08-18 LAB
ALBUMIN SERPL BCP-MCNC: 4 G/DL (ref 3.2–4.9)
ALBUMIN/GLOB SERPL: 1.3 G/DL
ALP SERPL-CCNC: 105 U/L (ref 30–99)
ALT SERPL-CCNC: 10 U/L (ref 2–50)
ANION GAP SERPL CALC-SCNC: 12 MMOL/L (ref 7–16)
AST SERPL-CCNC: 27 U/L (ref 12–45)
BASOPHILS # BLD AUTO: 0.5 % (ref 0–1.8)
BASOPHILS # BLD: 0.04 K/UL (ref 0–0.12)
BILIRUB SERPL-MCNC: <0.2 MG/DL (ref 0.1–1.5)
BUN SERPL-MCNC: 19 MG/DL (ref 8–22)
CALCIUM SERPL-MCNC: 9.2 MG/DL (ref 8.5–10.5)
CHLORIDE SERPL-SCNC: 99 MMOL/L (ref 96–112)
CO2 SERPL-SCNC: 25 MMOL/L (ref 20–33)
CREAT SERPL-MCNC: 1.31 MG/DL (ref 0.5–1.4)
EKG IMPRESSION: NORMAL
EOSINOPHIL # BLD AUTO: 0.1 K/UL (ref 0–0.51)
EOSINOPHIL NFR BLD: 1.2 % (ref 0–6.9)
ERYTHROCYTE [DISTWIDTH] IN BLOOD BY AUTOMATED COUNT: 53.7 FL (ref 35.9–50)
GFR SERPLBLD CREATININE-BSD FMLA CKD-EPI: 45 ML/MIN/1.73 M 2
GLOBULIN SER CALC-MCNC: 3.2 G/DL (ref 1.9–3.5)
GLUCOSE SERPL-MCNC: 176 MG/DL (ref 65–99)
HCT VFR BLD AUTO: 44.3 % (ref 37–47)
HGB BLD-MCNC: 14.8 G/DL (ref 12–16)
IMM GRANULOCYTES # BLD AUTO: 0.05 K/UL (ref 0–0.11)
IMM GRANULOCYTES NFR BLD AUTO: 0.6 % (ref 0–0.9)
LYMPHOCYTES # BLD AUTO: 0.92 K/UL (ref 1–4.8)
LYMPHOCYTES NFR BLD: 11.1 % (ref 22–41)
MCH RBC QN AUTO: 30.8 PG (ref 27–33)
MCHC RBC AUTO-ENTMCNC: 33.4 G/DL (ref 33.6–35)
MCV RBC AUTO: 92.1 FL (ref 81.4–97.8)
MONOCYTES # BLD AUTO: 0.44 K/UL (ref 0–0.85)
MONOCYTES NFR BLD AUTO: 5.3 % (ref 0–13.4)
NEUTROPHILS # BLD AUTO: 6.71 K/UL (ref 2–7.15)
NEUTROPHILS NFR BLD: 81.3 % (ref 44–72)
NRBC # BLD AUTO: 0 K/UL
NRBC BLD-RTO: 0 /100 WBC
NT-PROBNP SERPL IA-MCNC: 584 PG/ML (ref 0–125)
PLATELET # BLD AUTO: 262 K/UL (ref 164–446)
PMV BLD AUTO: 10.8 FL (ref 9–12.9)
POTASSIUM SERPL-SCNC: 4 MMOL/L (ref 3.6–5.5)
PROT SERPL-MCNC: 7.2 G/DL (ref 6–8.2)
RBC # BLD AUTO: 4.81 M/UL (ref 4.2–5.4)
SODIUM SERPL-SCNC: 136 MMOL/L (ref 135–145)
TROPONIN T SERPL-MCNC: 134 NG/L (ref 6–19)
WBC # BLD AUTO: 8.3 K/UL (ref 4.8–10.8)

## 2022-08-18 PROCEDURE — 99223 1ST HOSP IP/OBS HIGH 75: CPT | Mod: AI | Performed by: STUDENT IN AN ORGANIZED HEALTH CARE EDUCATION/TRAINING PROGRAM

## 2022-08-18 PROCEDURE — 700111 HCHG RX REV CODE 636 W/ 250 OVERRIDE (IP)

## 2022-08-18 PROCEDURE — 700117 HCHG RX CONTRAST REV CODE 255: Performed by: INTERNAL MEDICINE

## 2022-08-18 PROCEDURE — 93005 ELECTROCARDIOGRAM TRACING: CPT

## 2022-08-18 PROCEDURE — 85025 COMPLETE CBC W/AUTO DIFF WBC: CPT

## 2022-08-18 PROCEDURE — A9270 NON-COVERED ITEM OR SERVICE: HCPCS | Performed by: STUDENT IN AN ORGANIZED HEALTH CARE EDUCATION/TRAINING PROGRAM

## 2022-08-18 PROCEDURE — 700101 HCHG RX REV CODE 250

## 2022-08-18 PROCEDURE — C1894 INTRO/SHEATH, NON-LASER: HCPCS

## 2022-08-18 PROCEDURE — 84484 ASSAY OF TROPONIN QUANT: CPT | Mod: 91

## 2022-08-18 PROCEDURE — B2151ZZ FLUOROSCOPY OF LEFT HEART USING LOW OSMOLAR CONTRAST: ICD-10-PCS | Performed by: INTERNAL MEDICINE

## 2022-08-18 PROCEDURE — 80053 COMPREHEN METABOLIC PANEL: CPT

## 2022-08-18 PROCEDURE — 770000 HCHG ROOM/CARE - INTERMEDIATE ICU *

## 2022-08-18 PROCEDURE — 71045 X-RAY EXAM CHEST 1 VIEW: CPT

## 2022-08-18 PROCEDURE — 99223 1ST HOSP IP/OBS HIGH 75: CPT | Mod: 25 | Performed by: INTERNAL MEDICINE

## 2022-08-18 PROCEDURE — 4A023N7 MEASUREMENT OF CARDIAC SAMPLING AND PRESSURE, LEFT HEART, PERCUTANEOUS APPROACH: ICD-10-PCS | Performed by: INTERNAL MEDICINE

## 2022-08-18 PROCEDURE — B2111ZZ FLUOROSCOPY OF MULTIPLE CORONARY ARTERIES USING LOW OSMOLAR CONTRAST: ICD-10-PCS | Performed by: INTERNAL MEDICINE

## 2022-08-18 PROCEDURE — 700102 HCHG RX REV CODE 250 W/ 637 OVERRIDE(OP): Performed by: STUDENT IN AN ORGANIZED HEALTH CARE EDUCATION/TRAINING PROGRAM

## 2022-08-18 PROCEDURE — 93005 ELECTROCARDIOGRAM TRACING: CPT | Performed by: EMERGENCY MEDICINE

## 2022-08-18 PROCEDURE — 93458 L HRT ARTERY/VENTRICLE ANGIO: CPT | Mod: 26 | Performed by: INTERNAL MEDICINE

## 2022-08-18 PROCEDURE — 80061 LIPID PANEL: CPT

## 2022-08-18 PROCEDURE — 99152 MOD SED SAME PHYS/QHP 5/>YRS: CPT | Performed by: INTERNAL MEDICINE

## 2022-08-18 PROCEDURE — 99291 CRITICAL CARE FIRST HOUR: CPT

## 2022-08-18 PROCEDURE — 83880 ASSAY OF NATRIURETIC PEPTIDE: CPT

## 2022-08-18 RX ORDER — HEPARIN SODIUM 5000 [USP'U]/ML
4000 INJECTION, SOLUTION INTRAVENOUS; SUBCUTANEOUS ONCE
Status: ACTIVE | OUTPATIENT
Start: 2022-08-18 | End: 2022-08-19

## 2022-08-18 RX ORDER — NITROGLYCERIN 0.4 MG/1
0.4 TABLET SUBLINGUAL
Status: DISCONTINUED | OUTPATIENT
Start: 2022-08-18 | End: 2022-08-20 | Stop reason: HOSPADM

## 2022-08-18 RX ORDER — ENOXAPARIN SODIUM 100 MG/ML
40 INJECTION SUBCUTANEOUS DAILY
Status: DISCONTINUED | OUTPATIENT
Start: 2022-08-19 | End: 2022-08-20 | Stop reason: HOSPADM

## 2022-08-18 RX ORDER — MIDAZOLAM HYDROCHLORIDE 1 MG/ML
INJECTION INTRAMUSCULAR; INTRAVENOUS
Status: COMPLETED
Start: 2022-08-18 | End: 2022-08-18

## 2022-08-18 RX ORDER — ASPIRIN 81 MG/1
324 TABLET, CHEWABLE ORAL ONCE
Status: DISCONTINUED | OUTPATIENT
Start: 2022-08-18 | End: 2022-08-19

## 2022-08-18 RX ORDER — VERAPAMIL HYDROCHLORIDE 2.5 MG/ML
INJECTION, SOLUTION INTRAVENOUS
Status: COMPLETED
Start: 2022-08-18 | End: 2022-08-18

## 2022-08-18 RX ORDER — LIDOCAINE HYDROCHLORIDE 20 MG/ML
INJECTION, SOLUTION INFILTRATION; PERINEURAL
Status: COMPLETED
Start: 2022-08-18 | End: 2022-08-18

## 2022-08-18 RX ORDER — HEPARIN SODIUM 1000 [USP'U]/ML
INJECTION, SOLUTION INTRAVENOUS; SUBCUTANEOUS
Status: COMPLETED
Start: 2022-08-18 | End: 2022-08-18

## 2022-08-18 RX ORDER — HEPARIN SODIUM 200 [USP'U]/100ML
INJECTION, SOLUTION INTRAVENOUS
Status: COMPLETED
Start: 2022-08-18 | End: 2022-08-18

## 2022-08-18 RX ORDER — ATORVASTATIN CALCIUM 80 MG/1
80 TABLET, FILM COATED ORAL EVERY EVENING
Status: DISCONTINUED | OUTPATIENT
Start: 2022-08-18 | End: 2022-08-20 | Stop reason: HOSPADM

## 2022-08-18 RX ADMIN — IOHEXOL 57 ML: 350 INJECTION, SOLUTION INTRAVENOUS at 21:31

## 2022-08-18 RX ADMIN — FENTANYL CITRATE 50 MCG: 50 INJECTION, SOLUTION INTRAMUSCULAR; INTRAVENOUS at 21:32

## 2022-08-18 RX ADMIN — NITROGLYCERIN 10 ML: 20 INJECTION INTRAVENOUS at 20:55

## 2022-08-18 RX ADMIN — VERAPAMIL HYDROCHLORIDE 2.5 MG: 2.5 INJECTION, SOLUTION INTRAVENOUS at 20:54

## 2022-08-18 RX ADMIN — ATORVASTATIN CALCIUM 80 MG: 80 TABLET, FILM COATED ORAL at 22:58

## 2022-08-18 RX ADMIN — HEPARIN SODIUM 2000 UNITS: 200 INJECTION, SOLUTION INTRAVENOUS at 20:56

## 2022-08-18 RX ADMIN — MIDAZOLAM 1 MG: 1 INJECTION INTRAMUSCULAR; INTRAVENOUS at 21:32

## 2022-08-18 RX ADMIN — METOPROLOL TARTRATE 25 MG: 25 TABLET, FILM COATED ORAL at 23:41

## 2022-08-18 RX ADMIN — LIDOCAINE HYDROCHLORIDE: 20 INJECTION, SOLUTION INFILTRATION; PERINEURAL at 20:54

## 2022-08-18 RX ADMIN — HEPARIN SODIUM: 1000 INJECTION, SOLUTION INTRAVENOUS; SUBCUTANEOUS at 20:55

## 2022-08-18 ASSESSMENT — FIBROSIS 4 INDEX: FIB4 SCORE: 2.81

## 2022-08-19 ENCOUNTER — APPOINTMENT (OUTPATIENT)
Dept: CARDIOLOGY | Facility: MEDICAL CENTER | Age: 67
DRG: 281 | End: 2022-08-19
Attending: STUDENT IN AN ORGANIZED HEALTH CARE EDUCATION/TRAINING PROGRAM
Payer: MEDICARE

## 2022-08-19 PROBLEM — K21.9 GERD (GASTROESOPHAGEAL REFLUX DISEASE): Status: ACTIVE | Noted: 2022-08-19

## 2022-08-19 LAB
CHOLEST SERPL-MCNC: 159 MG/DL (ref 100–199)
EKG IMPRESSION: NORMAL
HDLC SERPL-MCNC: 49 MG/DL
LDLC SERPL CALC-MCNC: 98 MG/DL
LV EJECT FRACT  99904: 60
LV EJECT FRACT MOD 2C 99903: 70.77
LV EJECT FRACT MOD 4C 99902: 61.01
LV EJECT FRACT MOD BP 99901: 67.69
TRIGL SERPL-MCNC: 59 MG/DL (ref 0–149)
TROPONIN T SERPL-MCNC: 183 NG/L (ref 6–19)

## 2022-08-19 PROCEDURE — A9270 NON-COVERED ITEM OR SERVICE: HCPCS | Performed by: STUDENT IN AN ORGANIZED HEALTH CARE EDUCATION/TRAINING PROGRAM

## 2022-08-19 PROCEDURE — 99233 SBSQ HOSP IP/OBS HIGH 50: CPT | Performed by: STUDENT IN AN ORGANIZED HEALTH CARE EDUCATION/TRAINING PROGRAM

## 2022-08-19 PROCEDURE — 93306 TTE W/DOPPLER COMPLETE: CPT | Mod: 26 | Performed by: INTERNAL MEDICINE

## 2022-08-19 PROCEDURE — 93005 ELECTROCARDIOGRAM TRACING: CPT | Performed by: STUDENT IN AN ORGANIZED HEALTH CARE EDUCATION/TRAINING PROGRAM

## 2022-08-19 PROCEDURE — 700102 HCHG RX REV CODE 250 W/ 637 OVERRIDE(OP): Performed by: STUDENT IN AN ORGANIZED HEALTH CARE EDUCATION/TRAINING PROGRAM

## 2022-08-19 PROCEDURE — 770001 HCHG ROOM/CARE - MED/SURG/GYN PRIV*

## 2022-08-19 PROCEDURE — 93306 TTE W/DOPPLER COMPLETE: CPT

## 2022-08-19 PROCEDURE — 700111 HCHG RX REV CODE 636 W/ 250 OVERRIDE (IP): Performed by: STUDENT IN AN ORGANIZED HEALTH CARE EDUCATION/TRAINING PROGRAM

## 2022-08-19 PROCEDURE — 99232 SBSQ HOSP IP/OBS MODERATE 35: CPT | Performed by: INTERNAL MEDICINE

## 2022-08-19 PROCEDURE — 93010 ELECTROCARDIOGRAM REPORT: CPT | Performed by: INTERNAL MEDICINE

## 2022-08-19 RX ORDER — CARVEDILOL 3.12 MG/1
3.12 TABLET ORAL 2 TIMES DAILY WITH MEALS
Status: DISCONTINUED | OUTPATIENT
Start: 2022-08-19 | End: 2022-08-19

## 2022-08-19 RX ORDER — ISOSORBIDE MONONITRATE 30 MG/1
30 TABLET, EXTENDED RELEASE ORAL DAILY
Status: DISCONTINUED | OUTPATIENT
Start: 2022-08-20 | End: 2022-08-20 | Stop reason: HOSPADM

## 2022-08-19 RX ORDER — ASPIRIN 81 MG/1
81 TABLET, CHEWABLE ORAL DAILY
Status: DISCONTINUED | OUTPATIENT
Start: 2022-08-19 | End: 2022-08-20 | Stop reason: HOSPADM

## 2022-08-19 RX ORDER — VERAPAMIL HYDROCHLORIDE 240 MG/1
120 TABLET, FILM COATED, EXTENDED RELEASE ORAL
Status: DISCONTINUED | OUTPATIENT
Start: 2022-08-20 | End: 2022-08-20 | Stop reason: HOSPADM

## 2022-08-19 RX ORDER — OMEPRAZOLE 20 MG/1
20 CAPSULE, DELAYED RELEASE ORAL DAILY
Status: DISCONTINUED | OUTPATIENT
Start: 2022-08-19 | End: 2022-08-20 | Stop reason: HOSPADM

## 2022-08-19 RX ADMIN — ATORVASTATIN CALCIUM 80 MG: 80 TABLET, FILM COATED ORAL at 17:32

## 2022-08-19 RX ADMIN — CARVEDILOL 3.12 MG: 3.12 TABLET, FILM COATED ORAL at 17:32

## 2022-08-19 RX ADMIN — ASPIRIN 81 MG: 81 TABLET, CHEWABLE ORAL at 05:05

## 2022-08-19 RX ADMIN — OMEPRAZOLE 20 MG: 20 CAPSULE, DELAYED RELEASE ORAL at 17:32

## 2022-08-19 RX ADMIN — CARVEDILOL 3.12 MG: 3.12 TABLET, FILM COATED ORAL at 08:24

## 2022-08-19 RX ADMIN — ENOXAPARIN SODIUM 40 MG: 40 INJECTION SUBCUTANEOUS at 17:32

## 2022-08-19 ASSESSMENT — FIBROSIS 4 INDEX
FIB4 SCORE: 2.12
FIB4 SCORE: 2.12

## 2022-08-19 ASSESSMENT — ENCOUNTER SYMPTOMS
VOMITING: 0
COUGH: 0
MYALGIAS: 0
SHORTNESS OF BREATH: 0
HEMOPTYSIS: 0
EYE PAIN: 0
BRUISES/BLEEDS EASILY: 0
FOCAL WEAKNESS: 0
CHILLS: 0
BACK PAIN: 0
NECK PAIN: 0
DEPRESSION: 0
DOUBLE VISION: 0
INSOMNIA: 0
HEARTBURN: 0
HEADACHES: 0
SENSORY CHANGE: 1
DIZZINESS: 0
FEVER: 0
PALPITATIONS: 0
ABDOMINAL PAIN: 0
NAUSEA: 0
BLURRED VISION: 0

## 2022-08-19 ASSESSMENT — PATIENT HEALTH QUESTIONNAIRE - PHQ9
SUM OF ALL RESPONSES TO PHQ9 QUESTIONS 1 AND 2: 0
2. FEELING DOWN, DEPRESSED, IRRITABLE, OR HOPELESS: NOT AT ALL
1. LITTLE INTEREST OR PLEASURE IN DOING THINGS: NOT AT ALL

## 2022-08-19 ASSESSMENT — PAIN DESCRIPTION - PAIN TYPE: TYPE: ACUTE PAIN

## 2022-08-19 ASSESSMENT — LIFESTYLE VARIABLES: SUBSTANCE_ABUSE: 0

## 2022-08-19 NOTE — PROGRESS NOTES
Hospital Medicine Daily Progress Note    Date of Service  8/19/2022    Chief Complaint  Gena Dykes is a 66 y.o. female admitted 8/18/2022 with chest pain.    Hospital Course  Gena Dykes is a 66 y.o. female past medical history of paranoid schizophrenia who presented 8/18/2022 with chest pain that patient reports has been present over the last 3 days progressively worsening.  She reports the pain as pressure-like, 6 out of 10 intensity, nonradiating and not relieved with anything.  She denies any fever or chills.  No other relieving or exacerbating factors were noted.  In the ER, EKG showed ST elevations and inferior leads with reciprocal depressions.  Cardiology consulted patient taken emergently to cardiac cath.  Cardiac cath revealed nonobstructive CAD.  Patient admitted to [facility] for further care and monitoring.    Interval Problem Update  Admitted yesterday for chest pain and possible STEMI on EKG.  S/p LHC showing non obstructive CAD.  Cardiology following, appreciate their recommendations.  Patient feeling well, chest pain free.  Possible GERD, start PPI.    I have discussed this patient's plan of care and discharge plan at IDT rounds today with Case Management, Nursing, Nursing leadership, and other members of the IDT team.    Consultants/Specialty  cardiology    Code Status  Full Code    Disposition  Patient is not medically cleared for discharge.   Anticipate discharge to to home with close outpatient follow-up.  I have placed the appropriate orders for post-discharge needs.    Review of Systems  Review of Systems   Constitutional:  Negative for chills and fever.   Eyes:  Negative for blurred vision and pain.   Respiratory:  Negative for cough and shortness of breath.    Cardiovascular:  Negative for chest pain, palpitations and leg swelling.   Gastrointestinal:  Negative for abdominal pain, nausea and vomiting.   Genitourinary:  Negative for dysuria and urgency.   Musculoskeletal:  Negative  for back pain.   Skin:  Negative for itching and rash.   Neurological:  Positive for sensory change. Negative for dizziness, focal weakness and headaches.   Psychiatric/Behavioral:  Negative for substance abuse. The patient does not have insomnia.       Physical Exam  Temp:  [36 °C (96.8 °F)-36.8 °C (98.2 °F)] 36.8 °C (98.2 °F)  Pulse:  [60-87] 69  Resp:  [14-46] 21  BP: (116-163)/(56-93) 125/60  SpO2:  [90 %-97 %] 97 %    Physical Exam  Vitals reviewed.   Constitutional:       General: She is not in acute distress.     Appearance: She is not diaphoretic.   HENT:      Head: Normocephalic and atraumatic.      Right Ear: External ear normal.      Left Ear: External ear normal.      Nose: Nose normal. No congestion.      Mouth/Throat:      Pharynx: No oropharyngeal exudate or posterior oropharyngeal erythema.   Eyes:      Extraocular Movements: Extraocular movements intact.      Pupils: Pupils are equal, round, and reactive to light.   Cardiovascular:      Rate and Rhythm: Normal rate and regular rhythm.   Pulmonary:      Effort: Pulmonary effort is normal. No respiratory distress.      Breath sounds: Normal breath sounds. No wheezing.   Abdominal:      General: Bowel sounds are normal. There is no distension.      Palpations: Abdomen is soft.      Tenderness: There is no abdominal tenderness.   Musculoskeletal:         General: No swelling. Normal range of motion.      Cervical back: Normal range of motion and neck supple.   Skin:     General: Skin is warm and dry.   Neurological:      General: No focal deficit present.      Mental Status: She is alert and oriented to person, place, and time.      Sensory: No sensory deficit.      Motor: No weakness.   Psychiatric:         Mood and Affect: Mood normal.         Behavior: Behavior normal.       Fluids    Intake/Output Summary (Last 24 hours) at 8/19/2022 1458  Last data filed at 8/19/2022 0000  Gross per 24 hour   Intake 101 ml   Output --   Net 101 ml        Laboratory  Recent Labs     08/18/22 2016   WBC 8.3   RBC 4.81   HEMOGLOBIN 14.8   HEMATOCRIT 44.3   MCV 92.1   MCH 30.8   MCHC 33.4*   RDW 53.7*   PLATELETCT 262   MPV 10.8     Recent Labs     08/18/22 2016   SODIUM 136   POTASSIUM 4.0   CHLORIDE 99   CO2 25   GLUCOSE 176*   BUN 19   CREATININE 1.31   CALCIUM 9.2             Recent Labs     08/18/22  2345   TRIGLYCERIDE 59   HDL 49   LDL 98       Imaging  EC-ECHOCARDIOGRAM COMPLETE W/O CONT   Final Result      DX-CHEST-PORTABLE (1 VIEW)   Final Result         1.  No acute cardiopulmonary disease.   2.  Atherosclerosis      CL-LEFT HEART CATHETERIZATION WITH POSSIBLE INTERVENTION    (Results Pending)        Assessment/Plan  * STEMI (ST elevation myocardial infarction) (HCC)- (present on admission)  Assessment & Plan  Inferior wall STEMI  Admitted to [facility] for monitoring  Cardiac cath showing non-obstructive CAD.   ASA   Echo 2D  High intensity statin  Beta-blocker  Appreciate cardiology recommendations    GERD (gastroesophageal reflux disease)  Assessment & Plan  Start PPI    Hypercholesterolemia- (present on admission)  Assessment & Plan  High intensity statin  Check lipid panel    Paranoid schizophrenia (HCC)- (present on admission)  Assessment & Plan  I will hold her home clozapine dose given increased cardiotoxicity.  Can likely resume clozapine on discharge       VTE prophylaxis: enoxaparin ppx    I have performed a physical exam and reviewed and updated ROS and Plan today (8/19/2022). In review of yesterday's note (8/18/2022), there are no changes except as documented above.

## 2022-08-19 NOTE — H&P
Hospital Medicine History & Physical Note    Date of Service  8/18/2022    Primary Care Physician  Carito Zamora D.O.    Consultants  cardiology    Specialist Names: Dr. Sevilla     Code Status  Full Code    Chief Complaint  Chief Complaint   Patient presents with    Heartburn    Chest Pain       History of Presenting Illness  Gena Dykes is a 66 y.o. female past medical history of paranoid schizophrenia who presented 8/18/2022 with chest pain that patient reports has been present over the last 3 days progressively worsening.  She reports the pain as pressure-like, 6 out of 10 intensity, nonradiating and not relieved with anything.  She denies any fever or chills.  No other relieving or exacerbating factors were noted.  In the ER, EKG showed ST elevations and inferior leads with reciprocal depressions.  Cardiology consulted patient taken emergently to cardiac cath.  Cardiac cath revealed nonobstructive CAD.  Patient admitted to [facility] for further care and monitoring.    I discussed the plan of care with patient.    Review of Systems  Review of Systems   Constitutional:  Negative for chills and fever.   HENT:  Negative for hearing loss and tinnitus.    Eyes:  Negative for blurred vision and double vision.   Respiratory:  Negative for cough and hemoptysis.    Cardiovascular:  Positive for chest pain. Negative for palpitations.   Gastrointestinal:  Negative for heartburn and nausea.   Genitourinary:  Negative for dysuria and urgency.   Musculoskeletal:  Negative for myalgias and neck pain.   Skin:  Negative for itching and rash.   Neurological:  Negative for dizziness and headaches.   Endo/Heme/Allergies:  Does not bruise/bleed easily.   Psychiatric/Behavioral:  Negative for depression and suicidal ideas.      Past Medical History   has no past medical history on file.    Surgical History   has a past surgical history that includes lumpectomy (Left) and mass excision general (7/18/2017).     Family History  family  history includes Heart Disease in her mother.   Family history reviewed with patient. There is no family history that is pertinent to the chief complaint.     Social History   reports that she quit smoking about 10 years ago. Her smoking use included cigarettes. She has never used smokeless tobacco. She reports that she does not drink alcohol and does not use drugs.    Allergies  Allergies   Allergen Reactions    Azithromycin     Fluphenazine      hallucinations    Haldol [Haloperidol]     Erythromycin      Pt on Clozapine/no erythromycin    Haldol [Haloperidol Lactate]      Does not tolerate       Medications  Prior to Admission Medications   Prescriptions Last Dose Informant Patient Reported? Taking?   Asenapine Maleate 10 MG SL Tab  Patient Yes No   Sig: Place  under the tongue. Place 1 tab under tongue twice a day   CALCIUM + VITAMIN D3 600-5 MG-MCG Tab   No No   Sig: TAKE 1 TABLET BY MOUTH ONCE DAILY   Multiple Vitamin (MULTIVITAMIN) Tab   No No   Sig: TAKE 1 TABLET BY MOUTH ONCE DAILY   Omega-3 Fatty Acids (FISH OIL CONCENTRATE) 300 MG Cap  Patient Yes No   Sig: Take  by mouth.   STOOL SOFTENER 240 MG Cap   No No   Sig: Take 1 Capsule by mouth every day.   clozapine (CLOZARIL) 100 MG TABS  Patient Yes No   Sig: Take 100 mg by mouth 2 times a day. 200MG in Am and 300MG HS  Indications: two tabs every morning and 4 tabs every bedtime   polyethylene glycol/lytes (MIRALAX) Pack  Patient Yes No   Sig: Take 17 g by mouth every bedtime.   therapeutic multivitamin-minerals (THERAGRAN-M) TABS  Patient Yes No   Sig: Take 1 Tab by mouth every day.      Facility-Administered Medications: None       Physical Exam  Temp:  [36 °C (96.8 °F)-36.2 °C (97.2 °F)] 36.2 °C (97.2 °F)  Pulse:  [73-87] 73  Resp:  [16-46] 32  BP: (126-163)/(69-93) 160/76  SpO2:  [90 %-97 %] 97 %  Blood Pressure : (!) 160/76   Temperature: 36.2 °C (97.2 °F)   Pulse: 73   Respiration: (!) 32   Pulse Oximetry: 97 %       Physical Exam  Vitals and nursing  note reviewed.   Constitutional:       Appearance: Normal appearance.   HENT:      Head: Normocephalic and atraumatic.      Right Ear: Tympanic membrane normal.      Left Ear: Tympanic membrane normal.      Nose: Nose normal.      Mouth/Throat:      Mouth: Mucous membranes are moist.      Pharynx: Oropharynx is clear.   Eyes:      Extraocular Movements: Extraocular movements intact.      Pupils: Pupils are equal, round, and reactive to light.   Cardiovascular:      Rate and Rhythm: Normal rate and regular rhythm.      Pulses: Normal pulses.      Heart sounds: Normal heart sounds.   Pulmonary:      Effort: Pulmonary effort is normal. No respiratory distress.      Breath sounds: Normal breath sounds. No stridor.   Abdominal:      General: Bowel sounds are normal. There is no distension.      Palpations: Abdomen is soft. There is no mass.   Musculoskeletal:         General: No swelling or tenderness. Normal range of motion.      Cervical back: Neck supple.   Skin:     General: Skin is warm.      Capillary Refill: Capillary refill takes less than 2 seconds.      Coloration: Skin is not jaundiced or pale.   Neurological:      General: No focal deficit present.      Mental Status: She is alert. Mental status is at baseline.      Cranial Nerves: No cranial nerve deficit.      Sensory: No sensory deficit.   Psychiatric:      Comments: Flat affect        Laboratory:  Recent Labs     08/18/22 2016   WBC 8.3   RBC 4.81   HEMOGLOBIN 14.8   HEMATOCRIT 44.3   MCV 92.1   MCH 30.8   MCHC 33.4*   RDW 53.7*   PLATELETCT 262   MPV 10.8     Recent Labs     08/18/22 2016   SODIUM 136   POTASSIUM 4.0   CHLORIDE 99   CO2 25   GLUCOSE 176*   BUN 19   CREATININE 1.31   CALCIUM 9.2     Recent Labs     08/18/22  2016   ALTSGPT 10   ASTSGOT 27   ALKPHOSPHAT 105*   TBILIRUBIN <0.2   GLUCOSE 176*         Recent Labs     08/18/22  2016   NTPROBNP 584*     Recent Labs     08/18/22  2345   TRIGLYCERIDE 59   HDL 49   LDL 98     Recent Labs      08/18/22 2016 08/18/22  2345   TROPONINT 134* 183*       Imaging:  DX-CHEST-PORTABLE (1 VIEW)   Final Result         1.  No acute cardiopulmonary disease.   2.  Atherosclerosis      CL-LEFT HEART CATHETERIZATION WITH POSSIBLE INTERVENTION    (Results Pending)   EC-ECHOCARDIOGRAM COMPLETE W/O CONT    (Results Pending)       X-Ray:  I have personally reviewed the images and compared with prior images.    Assessment/Plan:  Justification for Admission Status  I anticipate this patient will require at least two midnights for appropriate medical management, necessitating inpatient admission because STEMI requiring cardiac catheterization.    Patient will need a Intermediate Care (Adult and Pediatrics) bed on CARDIOLOGY service .  The need is secondary to see above.    * STEMI (ST elevation myocardial infarction) (HCC)- (present on admission)  Assessment & Plan  Inferior wall STEMI  Admitted to IM for monitoring  Cardiac cath showing non-obstructive CAD.   ASA   Echo 2D  High intensity statin  Beta-blocker  Serial tropes      Hypercholesterolemia- (present on admission)  Assessment & Plan  High intensity statin  Check lipid panel    Paranoid schizophrenia (HCC)- (present on admission)  Assessment & Plan  I will hold her home clozapine dose given increased cardiotoxicity.  May consult psychiatry in a.m. for alternative treatment given her current MI.      VTE prophylaxis: SCDs/TEDs and enoxaparin ppx

## 2022-08-19 NOTE — CARE PLAN
The patient is Stable - Low risk of patient condition declining or worsening    Shift Goals  Clinical Goals: hemodynamic stability, no chest pain    Progress made toward(s) clinical / shift goals:  pt stable, denies pain      Problem: Knowledge Deficit - Standard  Goal: Patient and family/care givers will demonstrate understanding of plan of care, disease process/condition, diagnostic tests and medications  Outcome: Progressing     Problem: Discharge Barriers/Planning  Goal: Patient's continuum of care needs are met  Outcome: Progressing     Problem: Hemodynamics  Goal: Patient's hemodynamics, fluid balance and neurologic status will be stable or improve  Outcome: Progressing

## 2022-08-19 NOTE — DISCHARGE PLANNING
Care Transition Team Assessment          Pt admitted w/ STEMI, demo,'s verified, pt from [facility] ( group home), agreeable to return at discharge, transportation will be provided by [facility] [phone], pt has a PCP and Psych MD to follow at discharge. No needs identified at this time for discharge       Information Source  Orientation Level: Oriented X4  Information Given By: Patient  Who is responsible for making decisions for patient? : Patient    Readmission Evaluation  Is this a readmission?: No    Elopement Risk  Legal Hold: No  Ambulatory or Self Mobile in Wheelchair: Yes  Disoriented: No  Psychiatric Symptoms: None  History of Wandering: No  Elopement this Admit: No  Vocalizing Wanting to Leave: No  Displays Behaviors, Body Language Wanting to Leave: No-Not at Risk for Elopement  Elopement Risk: Not at Risk for Elopement      Discharge Preparedness  What is your plan after discharge?: Group home ([facility] [address])  What are your discharge supports?: Other (comment) (Group home staff)  Prior Functional Level: Ambulatory, Needs Assist with Medication Management  Difficulity with ADLs: None  Difficulity with IADLs: Cooking, Driving, Keeping track of finances, Laundry, Managing medication    Functional Assesment  Prior Functional Level: Ambulatory, Needs Assist with Medication Management    Finances  Financial Barriers to Discharge: No  Prescription Coverage: Yes    Domestic Abuse  Have you ever been the victim of abuse or violence?: No  Physical Abuse or Sexual Abuse: No  Verbal Abuse or Emotional Abuse: No  Possible Abuse/Neglect Reported to:: Not Applicable    Psychological Assessment  History of Substance Abuse: None    Discharge Risks or Barriers  Discharge risks or barriers?: Mental health (need to confirm follow up)    Anticipated Discharge Information  Discharge Disposition: Discharged to home/self care (01)  Discharge Address: [address],  [place]  Discharge Contact Phone Number: [phone]

## 2022-08-19 NOTE — CONSULTS
Reason for Consult:  Asked by Dr Deluca to see this patient with  STEMI  Patient's PCP: Carito Zamora D.O.    CC:   Chief Complaint   Patient presents with    Heartburn    Chest Pain       HPI: 66-year-old female patient with schizophrenia, hyperlipidemia, living in a group home presented with 1 day of chest pain/pressure moderate intensity, no radiation, associated with nausea vomiting.  EKG showed inferior ST elevations.  Patient appears comfortable.  Able to understand the conversation.    Medications / Drug list prior to admission:  No current facility-administered medications on file prior to encounter.     Current Outpatient Medications on File Prior to Encounter   Medication Sig Dispense Refill    CALCIUM + VITAMIN D3 600-5 MG-MCG Tab TAKE 1 TABLET BY MOUTH ONCE DAILY 90 Tablet 3    Multiple Vitamin (MULTIVITAMIN) Tab TAKE 1 TABLET BY MOUTH ONCE DAILY 30 Tablet 11    STOOL SOFTENER 240 MG Cap Take 1 Capsule by mouth every day. 90 Capsule 1    Asenapine Maleate 10 MG SL Tab Place  under the tongue. Place 1 tab under tongue twice a day      Omega-3 Fatty Acids (FISH OIL CONCENTRATE) 300 MG Cap Take  by mouth.      polyethylene glycol/lytes (MIRALAX) Pack Take 17 g by mouth every bedtime.      clozapine (CLOZARIL) 100 MG TABS Take 100 mg by mouth 2 times a day. 200MG in Am and 300MG HS  Indications: two tabs every morning and 4 tabs every bedtime      therapeutic multivitamin-minerals (THERAGRAN-M) TABS Take 1 Tab by mouth every day.         Current list of administered Medications:    Current Facility-Administered Medications:     VERAPAMIL HCL 2.5 MG/ML IV SOLN, , , ,     HEPARIN SODIUM (PORCINE) 1000 UNIT/ML INJ SOLN, , , ,     LIDOCAINE HCL 2 % INJ SOLN, , , ,     HEPARIN (PORCINE) IN NACL 2000-0.9 UNIT/L-% IV SOLN, , , ,     NITROGLYCERIN 2 MG IV SOLN, , , ,     heparin injection 4,000 Units, 4,000 Units, Intravenous, Once, Medardo Deluca M.D.    aspirin (ASA) chewable tab 324 mg, 324 mg, Oral, Once, Medardo  STELLA Deluca M.D.    FENTANYL CITRATE (PF) 0.05 MG/ML INJ SOLN (WRAPPED), , , ,     MIDAZOLAM HCL 2 MG/2ML INJ SOLN (WRAPPED), , , ,     Current Outpatient Medications:     CALCIUM + VITAMIN D3 600-5 MG-MCG Tab, TAKE 1 TABLET BY MOUTH ONCE DAILY, Disp: 90 Tablet, Rfl: 3    Multiple Vitamin (MULTIVITAMIN) Tab, TAKE 1 TABLET BY MOUTH ONCE DAILY, Disp: 30 Tablet, Rfl: 11    STOOL SOFTENER 240 MG Cap, Take 1 Capsule by mouth every day., Disp: 90 Capsule, Rfl: 1    Asenapine Maleate 10 MG SL Tab, Place  under the tongue. Place 1 tab under tongue twice a day, Disp: , Rfl:     Omega-3 Fatty Acids (FISH OIL CONCENTRATE) 300 MG Cap, Take  by mouth., Disp: , Rfl:     polyethylene glycol/lytes (MIRALAX) Pack, Take 17 g by mouth every bedtime., Disp: , Rfl:     clozapine (CLOZARIL) 100 MG TABS, Take 100 mg by mouth 2 times a day. 200MG in Am and 300MG HS  Indications: two tabs every morning and 4 tabs every bedtime, Disp: , Rfl:     therapeutic multivitamin-minerals (THERAGRAN-M) TABS, Take 1 Tab by mouth every day., Disp: , Rfl:     No past medical history on file.    Past Surgical History:   Procedure Laterality Date    MASS EXCISION GENERAL  7/18/2017    Procedure: ;  Surgeon: Brock Colin M.D.;  [facility] SURGERY [facility];  Service:     LUMPECTOMY Left        Family History   Problem Relation Age of Onset    Heart Disease Mother        Social History     Socioeconomic History    Marital status: Single     Spouse name: Not on file    Number of children: Not on file    Years of education: Not on file    Highest education level: Not on file   Occupational History    Not on file   Tobacco Use    Smoking status: Former     Types: Cigarettes     Quit date: 12/12/2011     Years since quitting: 10.6    Smokeless tobacco: Never    Tobacco comments:     smoked for 45 years   Vaping Use    Vaping Use: Never used   Substance and Sexual Activity    Alcohol use: No     Alcohol/week: 0.0 oz    Drug use: No    Sexual activity: Not  "on file   Other Topics Concern    Not on file   Social History Narrative    Not on file     Social Determinants of Health     Financial Resource Strain: Not on file   Food Insecurity: Not on file   Transportation Needs: Not on file   Physical Activity: Not on file   Stress: Not on file   Social Connections: Not on file   Intimate Partner Violence: Not on file   Housing Stability: Not on file       ALLERGIES:  Allergies   Allergen Reactions    Azithromycin     Fluphenazine      hallucinations    Haldol [Haloperidol]     Erythromycin      Pt on Clozapine/no erythromycin    Haldol [Haloperidol Lactate]      Does not tolerate       Review of systems:  A detailed review of symptoms was reviewed with patient. This is reviewed in H&P and PMH. ALL OTHERS reviewed and negative    Physical exam:  Patient Vitals for the past 24 hrs:   BP Temp Temp src Pulse Resp SpO2 Height Weight   22 -- -- -- -- -- -- 1.6 m (5' 3\") 69.9 kg (154 lb 1.6 oz)   22 (!) 126/93 36 °C (96.8 °F) Temporal 85 18 93 % -- --     General: No acute distress.   EYES: no jaundice  HEENT: OP clear   Neck: No bruits No JVD.   CVS: RRR. S1 + S2. No M/R/G  Resp: CTAB. No wheezing or crackles/rhonchi.  Abdomen: Soft, NT, ND,  Skin: Grossly nothing acute no obvious rashes  Neurological: Alert, Moves all extremities, no cranial nerve defects on limited exam  Extremities: Pulse 2+ in b/l LE.  no edema. No cyanosis.       Data:  Laboratory studies:  Recent Results (from the past 24 hour(s))   EKG (NOW)    Collection Time: 22  8:07 PM   Result Value Ref Range    Report       [facility] Emergency [facility]    Test Date:  2022  Pt Name:    ANDRES BANUELOS               Department: ER  MRN:        [id number]                      Room:  Gender:     Female                       Technician: [id number]  :        [id number]                   Requested By:ER TRIAGE PROTOCOL  Order #:    [id number]                    Reading " MD:    Measurements  Intervals                                Axis  Rate:       74                           P:          40  NH:         169                          QRS:        16  QRSD:       90                           T:          87  QT:         382  QTc:        424    Interpretive Statements  Sinus rhythm  Probable left atrial enlargement  Low voltage, precordial leads  Minimal ST depression, lateral leads  ST elevation, consider inferior injury  Compared to ECG 07/03/2017 08:10:07  Low QRS voltage now present  ST (T wave) deviation now present  Myocardial infarct finding now present         Imaging:  CL-LEFT HEART CATHETERIZATION WITH POSSIBLE INTERVENTION    (Results Pending)           EKG : personally reviewed by me sinus rhythm recent inferior MI    All pertinent features of laboratory and imaging reviewed including primary images where applicable    Assessment / Plan:  66-year-old female patient with schizophrenia, hyperlipidemia presented with subacute inferior ST elevation myocardial infarction.    Discussed risks benefits of coronary angiogram, PCI.  Patient was hesitant at the beginning but understands the conversation, okay with the procedure if it is lifesaving.    Verbal consent was obtained, patient will be taken to Cath Lab for emergent coronary angiogram with possible PCI      It is my pleasure to participate in the care of Ms. Dykes.  Please do not hesitate to contact me with questions or concerns.    Ben Sevilla M.D.    8/18/2022

## 2022-08-19 NOTE — PROGRESS NOTES
4 Eyes Skin Assessment Completed by RAMEZ Mcqueen and RAMEZ Yuo.    Head WDL  Ears WDL  Nose WDL  Mouth WDL  Neck WDL  Breast/Chest WDL  Shoulder Blades WDL  Spine WDL  (R) Arm/Elbow/Hand WDL  (L) Arm/Elbow/Hand WDL  Abdomen WDL  Groin WDL  Scrotum/Coccyx/Buttocks WDL  (R) Leg Abrasion  (L) Leg WDL  (R) Heel/Foot/Toe WDL  (L) Heel/Foot/Toe WDL          Devices In Places ECG, Blood Pressure Cuff, and Pulse Ox      Interventions In Place N/A    Possible Skin Injury No    Pictures Uploaded Into Epic Yes  Wound Consult Placed N/A  RN Wound Prevention Protocol Ordered No

## 2022-08-19 NOTE — DIETARY
Nutrition Services: Day 1 of admit.  Gena Dykes is a 66 y.o. female with admitting DX of STEMI (ST elevation myocardial infarction) (HCC).    Consult received for Cardiac Rehab Diet Education.  S/p cardiac cath, which revealed nonobstructive CAD.  Lipid panel WNL.  Of note, pt lives in  secondary to Paranoid schizophrenia.  No indication for specific cardiac diet education per RD judgment.

## 2022-08-19 NOTE — CONSULTS
Brief intensivist note.     65yo female admitted for STEMI. Underwent emergent LHC that showed non-obstructive CAD. LVEF 60%. LVEDP 29  Hemodynamically stable post STEMI.     Pt to admit to IMCU. ADT order in place  Hospital Medicine will admit  Please let us know if need critical care assistance.     Eliot Lozano D.O.

## 2022-08-19 NOTE — PROCEDURES
Cardiac Catheterization report    8/18/2022  9:24 PM    Referring MD:     Primary Care Provider: Carito Zamora D.O.    Indication for procedure: ST elevation myocardial infarction    Procedures performed:  Coronary arteriograms  Left heart catheterization and Left ventriculogram     Final impression:  Non-obstructive coronary artery disease    Recommendations:  Guideline directed medical therapy   Cardiovascular Risk factor modification    Findings:  1.  Left main coronary artery:  Normal.  2.  Left anterior descending artery:  Luminal irregularities, diagonals are small.  3.  Left circumflex coronary artery:  Luminal irregularities. Gives two marginal branches, which have no significant disease   4.  Right coronary artery:  30-40% stenosis in the proximal right coronary artery disease. This is a right dominant system.  5.  Left ventricular end diastolic pressure:  29 mmHg.  No signficant gradient across the aortic valve.  6.  Left ventriculogram:  Ejection fraction of 60%.      EBL: <10 CC    Specimens: None    Procedure details:  The risks and benefits of cardiac catheterization and possible intervention were explained to the patient including death, heart attack, stroke, and emergency surgery.  The patient verbalized understanding and wished to proceed.  The patient was brought to the cardiac catheterization laboratory in the fasting state and prepped and draped in the usual sterile fashion.  Timeout was performed.  The right wrist was locally anesthetized with lidocaine and the right radial artery was cannulated with 5/6-Green[place]ic equipment and standard radial cocktail was given.  Coronary angiography was performed using JR 4 and JL 3.5  diagnostic catheters in the usual fashion, results mentioned below.  JR 4 catheter was used to cross the aortic valve to perform  left heart catheterization and left ventriculogram.    Once all the views were obtained, all wires and catheters were removed from the patient  without difficulty.  A Vasc-Band was placed over the right radial artery and the radial artery sheath was removed without difficulty.      Complications: None    Contrast: 57 cc    Sedation time:  I supervised moderate sedation over a trained independent nursing staff,  Sedation Start time:  08:58   Sedation Stop time: 09:16      TERI Francis  [facility]

## 2022-08-19 NOTE — ASSESSMENT & PLAN NOTE
Inferior wall STEMI  Admitted to [facility] for monitoring  Cardiac cath showing non-obstructive CAD.   ASA   Echo 2D  High intensity statin  Beta-blocker  Appreciate cardiology recommendations

## 2022-08-19 NOTE — ED TRIAGE NOTES
"Chief Complaint   Patient presents with    Heartburn    Chest Pain       67 yo F to triage for above complaint. EKG completed and presented to ERP, STEMI called. Pt placed on gurney and transported to T3.     Pt had 2 episodes of emesis in triage room.     BP (!) 126/93   Pulse 85   Temp 36 °C (96.8 °F) (Temporal)   Resp 18   Ht 1.6 m (5' 3\")   Wt 69.9 kg (154 lb 1.6 oz)   LMP 07/03/2000 (Approximate)   SpO2 93%   BMI 27.30 kg/m²     "

## 2022-08-19 NOTE — ASSESSMENT & PLAN NOTE
I will hold her home clozapine dose given increased cardiotoxicity.  Can likely resume clozapine on discharge

## 2022-08-19 NOTE — PROGRESS NOTES
Transferred pt from T619 as IMCU overflow into T633 via monitored bed.  Pt tolerated well.  Denies pain

## 2022-08-19 NOTE — ED PROVIDER NOTES
"ED Provider Note    Scribed for Medardo Deluca M.D. by Kendell May. 8/18/2022  8:12 PM    Primary care provider: Carito Zamora D.O.  Means of arrival: Walk in  History obtained from: Patient   History limited by: None    CHIEF COMPLAINT  Chief Complaint   Patient presents with    Heartburn    Chest Pain       HPI  Gena Dykes is a 66 y.o. female who presents to the Emergency Department for evaluation of moderate chest pain onset 1 day ago. The patient states that she suddenly began experiencing chest pain yesterday without any specific trigger. She notes that the pain has become progressively worse which prompted her to visit the ED. Patient localizes the chest pain to the center of her chest and describes it as a severe case of \"heart burn.\". Associated symptoms include vomiting. She reports having multiple episodes of vomiting beginning yesterday and had 2 episodes in triage. The patient denies any shortness of breath. Patient has a history of schizophrenia and breast cancer, but denies any diabetes or hypertension. She currently resides in a group home. No alleviating or exacerbating factors noted.    REVIEW OF SYSTEMS  Pertinent positives include: chest pain and vomiting. Pertinent negatives include: shortness of breath. See history of present illness. All other systems are negative.     PAST MEDICAL HISTORY       SURGICAL HISTORY   has a past surgical history that includes lumpectomy (Left) and mass excision general (7/18/2017).    SOCIAL HISTORY  Social History     Tobacco Use    Smoking status: Former     Types: Cigarettes     Quit date: 12/12/2011     Years since quitting: 10.6    Smokeless tobacco: Never    Tobacco comments:     smoked for 45 years   Vaping Use    Vaping Use: Never used   Substance Use Topics    Alcohol use: No     Alcohol/week: 0.0 oz    Drug use: No      Social History     Substance and Sexual Activity   Drug Use No       FAMILY HISTORY  Family History   Problem Relation Age of " "Onset    Heart Disease Mother        CURRENT MEDICATIONS  Home Medications    **Home medications have not yet been reviewed for this encounter**         ALLERGIES  Allergies   Allergen Reactions    Azithromycin     Fluphenazine      hallucinations    Haldol [Haloperidol]     Erythromycin      Pt on Clozapine/no erythromycin    Haldol [Haloperidol Lactate]      Does not tolerate       PHYSICAL EXAM  VITAL SIGNS: BP (!) 126/93   Pulse 85   Temp 36 °C (96.8 °F) (Temporal)   Resp 18   Ht 1.6 m (5' 3\")   Wt 69.9 kg (154 lb 1.6 oz)   LMP 07/03/2000 (Approximate)   SpO2 93%   BMI 27.30 kg/m²     Constitutional: Alert in no apparent distress.  HENT: No signs of trauma, Bilateral external ears normal, Nose normal. Uvula midline.   Eyes: Pupils are equal and reactive, Conjunctiva normal, Non-icteric.   Neck: Normal range of motion, No tenderness, Supple, No stridor.   Lymphatic: No lymphadenopathy noted.   Cardiovascular: Regular rate and rhythm, no murmurs. 2+ peripheral pulses x4.   Thorax & Lungs: Normal breath sounds, No respiratory distress, No wheezing, No chest tenderness.   Abdomen:  Soft, No tenderness, No peritoneal signs, No masses, No pulsatile masses.   Skin: Warm, Dry, No erythema, No rash.   Back: No bony tenderness, No CVA tenderness.   Extremities: Intact distal pulses, No edema, No tenderness, No cyanosis.  Musculoskeletal: Good range of motion in all major joints. No tenderness to palpation or major deformities noted.   Neurologic: Alert , Normal motor function, Normal sensory function, No focal deficits noted.   Psychiatric: Affect normal, Judgment normal, Mood normal.     DIAGNOSTIC STUDIES / PROCEDURES    EKG  Results for orders placed or performed during the hospital encounter of 08/18/22   EKG (NOW)   Result Value Ref Range    Report       [facility] Emergency Dept.    Test Date:  2022-08-18  Pt Name:    ANDRES BANUELOS               Department: ER  MRN:        [id number]        "               Room:  Gender:     Female                       Technician: [id number]  :        1955                   Requested By:ER TRIAGE PROTOCOL  Order #:    [id number]                    Reading MD:    Measurements  Intervals                                Axis  Rate:       74                           P:          40  DE:         169                          QRS:        16  QRSD:       90                           T:          87  QT:         382  QTc:        424    Interpretive Statements  Sinus rhythm  Probable left atrial enlargement  Low voltage, precordial leads  Minimal ST depression, lateral leads  ST elevation, consider inferior injury  Compared to ECG 2017 08:10:07  Low QRS voltage now present  ST (T wave) deviation now present  Myocardial infarct finding now present         COURSE & MEDICAL DECISION MAKING  Nursing notes, VS, PMSFHx reviewed in chart.    66 y.o. female p/w chief complaint of chest pain.    #acute chest pain  Inferior STEMI on ekg  Given ASA and heparin bolus  CBC,CMP, trop pending at time of admit  HEART SCORE: 4    8:14 PM Patient seen and examined in the trauma bay. Ordered EKG. The patient will be treated with Aspirin 324 mg and Heparin 4000 units. The patient had an EKG completed and had a Code STEMI called.    8:18 PM - I discussed the patient's case and the above findings with Dr. Sevilla (Cardiology) who relayed to send the patient to the CATH lab and they will meet them there    8:22 PM - I attempted to call the patients group home caretaker twice without answer.    8:23 PM - Informed the patient that her group home caretaker did not answer, she provided a new number for us to call    8:25 PM - I contacted the patient's group home caretaker with a new number and informed them that the patient is likely having an MI. Her group home caretaker relayed that the patient is able to make decisions for herself.     8:29 PM - Relayed my conversation with her group home  caretaker, she understands the plan of care and will proceed to the CATH Lab.     8:39 PM -  is present in the trauma bay.     Critical care note:  The total critical care time on this patient is 40 minutes, resuscitating patient, speaking with admitting physician, and deciphering test results. This 40 minutes is exclusive of separately billable procedures.    I verified that the patient was wearing a mask and I was wearing appropriate PPE every time I entered the room. The patient's mask was on the patient at all times during my encounter except for a brief view of the oropharynx.         DISPOSITION:  Patient will be hospitalized by Dr. Lozano in critical condition.      FINAL IMPRESSION  1. ST elevation myocardial infarction (STEMI), unspecified artery (HCC)    2.    Critical care time performed by me, as detailed above     Kendell PATTERSON (Scribe), am scribing for, and in the presence of, Medardo Deluca M.D..    Electronically signed by: Kendell May (Scribe), 8/18/2022    IMedardo M.D. personally performed the services described in this documentation, as scribed by Kendell May in my presence, and it is both accurate and complete.    The note accurately reflects work and decisions made by me.  Medardo Deluca M.D.  8/18/2022  10:15 PM

## 2022-08-20 ENCOUNTER — PHARMACY VISIT (OUTPATIENT)
Dept: PHARMACY | Facility: MEDICAL CENTER | Age: 67
End: 2022-08-20
Payer: MEDICARE

## 2022-08-20 VITALS
SYSTOLIC BLOOD PRESSURE: 116 MMHG | WEIGHT: 151.46 LBS | OXYGEN SATURATION: 91 % | BODY MASS INDEX: 26.84 KG/M2 | HEIGHT: 63 IN | DIASTOLIC BLOOD PRESSURE: 55 MMHG | RESPIRATION RATE: 17 BRPM | TEMPERATURE: 98.7 F | HEART RATE: 87 BPM

## 2022-08-20 PROCEDURE — 99239 HOSP IP/OBS DSCHRG MGMT >30: CPT | Performed by: STUDENT IN AN ORGANIZED HEALTH CARE EDUCATION/TRAINING PROGRAM

## 2022-08-20 PROCEDURE — A9270 NON-COVERED ITEM OR SERVICE: HCPCS | Performed by: STUDENT IN AN ORGANIZED HEALTH CARE EDUCATION/TRAINING PROGRAM

## 2022-08-20 PROCEDURE — RXMED WILLOW AMBULATORY MEDICATION CHARGE: Performed by: STUDENT IN AN ORGANIZED HEALTH CARE EDUCATION/TRAINING PROGRAM

## 2022-08-20 PROCEDURE — A9270 NON-COVERED ITEM OR SERVICE: HCPCS | Performed by: INTERNAL MEDICINE

## 2022-08-20 PROCEDURE — 700102 HCHG RX REV CODE 250 W/ 637 OVERRIDE(OP): Performed by: STUDENT IN AN ORGANIZED HEALTH CARE EDUCATION/TRAINING PROGRAM

## 2022-08-20 PROCEDURE — 700102 HCHG RX REV CODE 250 W/ 637 OVERRIDE(OP): Performed by: INTERNAL MEDICINE

## 2022-08-20 RX ORDER — OMEPRAZOLE 20 MG/1
20 CAPSULE, DELAYED RELEASE ORAL DAILY
Qty: 30 CAPSULE | Refills: 0 | Status: SHIPPED | OUTPATIENT
Start: 2022-08-21 | End: 2022-09-15

## 2022-08-20 RX ORDER — ATORVASTATIN CALCIUM 80 MG/1
80 TABLET, FILM COATED ORAL EVERY EVENING
Qty: 30 TABLET | Refills: 0 | Status: SHIPPED | OUTPATIENT
Start: 2022-08-20 | End: 2022-09-13 | Stop reason: SDUPTHER

## 2022-08-20 RX ORDER — ASPIRIN 81 MG/1
81 TABLET, CHEWABLE ORAL DAILY
Qty: 100 TABLET | Refills: 0 | Status: SHIPPED | OUTPATIENT
Start: 2022-08-21 | End: 2022-09-13 | Stop reason: SDUPTHER

## 2022-08-20 RX ORDER — ISOSORBIDE MONONITRATE 30 MG/1
30 TABLET, EXTENDED RELEASE ORAL DAILY
Qty: 30 TABLET | Refills: 0 | Status: SHIPPED | OUTPATIENT
Start: 2022-08-21 | End: 2022-09-13 | Stop reason: SDUPTHER

## 2022-08-20 RX ADMIN — ISOSORBIDE MONONITRATE 30 MG: 30 TABLET, EXTENDED RELEASE ORAL at 05:29

## 2022-08-20 RX ADMIN — ASPIRIN 81 MG: 81 TABLET, CHEWABLE ORAL at 05:29

## 2022-08-20 RX ADMIN — VERAPAMIL HYDROCHLORIDE 120 MG: 240 TABLET, FILM COATED, EXTENDED RELEASE ORAL at 05:31

## 2022-08-20 RX ADMIN — OMEPRAZOLE 20 MG: 20 CAPSULE, DELAYED RELEASE ORAL at 05:29

## 2022-08-20 NOTE — PROGRESS NOTES
Assumed care of patient at bedside report from Ariane MYRICK. Pt Aox4, on room air, lying calmly in bed, no signs of distress, and all patient needs addressed during bedside report. Updated on POC. Call light within reach. Patient instructed to press the red call light button when needing assistance. Whiteboard updated. Fall precautions in place. Bed locked and in lowest position. Bed alarm pad under patient and activated. Two side rails up and locked. No complaints of discomfort at this time.

## 2022-08-20 NOTE — CARE PLAN
The patient is Stable - Low risk of patient condition declining or worsening    Shift Goals  Clinical Goals: discharge in AM  Patient Goals: go back to group home  Family Goals: heriberto    Progress made toward(s) clinical / shift goals:  yes    Patient is not progressing towards the following goals:      Problem: Knowledge Deficit - Standard  Goal: Patient and family/care givers will demonstrate understanding of plan of care, disease process/condition, diagnostic tests and medications  Outcome: Progressing     Problem: Discharge Barriers/Planning  Goal: Patient's continuum of care needs are met  Outcome: Progressing     Problem: Hemodynamics  Goal: Patient's hemodynamics, fluid balance and neurologic status will be stable or improve  Outcome: Progressing

## 2022-08-20 NOTE — PROGRESS NOTES
"Cardiology Follow-up Consult Note    Date of Service:    8/19/2022      Consulting Physician: Manuel Cates M.D.    Patient ID:    Name:   Gena Dykes   YOB: 1955  Age:   66 y.o.  female   MRN:   [id number]      Reason for Consultation: chest pain    HPI/Patient ID:     66-year-old female patient with schizophrenia, hyperlipidemia, living in a group home presented with 1 day of chest pain/pressure moderate intensity and ST elevations on EKG.     Interim Events:  Cath showed non-obstructive CAD    No chest pain.     Past medical, surgical, social, and family history reviewed and unchanged from admission except as noted in assessment and plan.    Medications: Reviewed in MAR      Allergies   Allergen Reactions    Azithromycin     Fluphenazine      hallucinations    Haldol [Haloperidol]     Erythromycin      Pt on Clozapine/no erythromycin    Haldol [Haloperidol Lactate]      Does not tolerate           Physical Exam  Body mass index is 26.83 kg/m².  /55   Pulse 77   Temp 36.9 °C (98.4 °F) (Temporal)   Resp 19   Ht 1.6 m (5' 3\")   Wt 68.7 kg (151 lb 7.3 oz)   SpO2 91%   Vitals:    08/19/22 0800 08/19/22 1200 08/19/22 1400 08/19/22 1600   BP: 125/60 103/55 106/51 105/55   Pulse: 69 72 79 77   Resp: (!) 21 19 19 19   Temp: 36.8 °C (98.2 °F) 36.8 °C (98.2 °F)  36.9 °C (98.4 °F)   TempSrc: Temporal Temporal  Temporal   SpO2: 97% 92% 90% 91%   Weight:       Height:         Oxygen Therapy:  Pulse Oximetry: 91 %, O2 (LPM): 1, O2 Delivery Device: None - Room Air    General: No apparent distress  Eyes: pale conjunctiva  ENT: OP clear  Neck: JVP 4 cm H2O  Lungs: normal respiratory effort, CTAB  Heart: RRR, no murmurs, no rubs or gallops, no edema bilateral lower extremities. No LV/RV heave on cardiac palpatation. 2+ bilateral radial pulses.  2+ bilateral dp pulses.   Abdomen: soft, non tender, non distended, no masses, normal bowel sounds.  No HSM.  Extremities/MSK: no clubbing, no " cyanosis  Neurological: No focal sensory deficits  Psychiatric: diminished affect, A/O x 3  Skin: Warm extremities      Labs (personally reviewed and notable for):   Lab Results   Component Value Date/Time    SODIUM 136 08/18/2022 08:16 PM    POTASSIUM 4.0 08/18/2022 08:16 PM    CHLORIDE 99 08/18/2022 08:16 PM    CO2 25 08/18/2022 08:16 PM    GLUCOSE 176 (H) 08/18/2022 08:16 PM    BUN 19 08/18/2022 08:16 PM    CREATININE 1.31 08/18/2022 08:16 PM    BUNCREATRAT 17 12/08/2021 05:55 AM      Lab Results   Component Value Date/Time    WBC 8.3 08/18/2022 08:16 PM    RBC 4.81 08/18/2022 08:16 PM    HEMOGLOBIN 14.8 08/18/2022 08:16 PM    HEMATOCRIT 44.3 08/18/2022 08:16 PM    MCV 92.1 08/18/2022 08:16 PM    MCH 30.8 08/18/2022 08:16 PM    MCHC 33.4 (L) 08/18/2022 08:16 PM    MPV 10.8 08/18/2022 08:16 PM    NEUTSPOLYS 81.30 (H) 08/18/2022 08:16 PM    LYMPHOCYTES 11.10 (L) 08/18/2022 08:16 PM    MONOCYTES 5.30 08/18/2022 08:16 PM    EOSINOPHILS 1.20 08/18/2022 08:16 PM    BASOPHILS 0.50 08/18/2022 08:16 PM    ANISOCYTOSIS 1+ 11/21/2017 04:15 PM              Cardiac Imaging and Procedures Review:    EKG dated 8/18/2022:   Personally reviewed and showed ST elevations which then resolved.     Echo dated 8/19/2022:   CONCLUSIONS  Normal left ventricular size, thickness, systolic function, and   diastolic function.  Normal regional wall motion.      Radiology test Review:  CXR:   IMPRESSION:        1.  No acute cardiopulmonary disease.  2.  Atherosclerosis      Impression and Medical Decision Making:  # MINOCA (Myocardial infarction with non-obstructive coronary artery disease)  # Schizophrenia  # CKD  # mild alk phos elevation        Recommendations:  # aspirin, lipitor  # switch to verapamil  # outpatient [facility] referral for continued management of MINOCA  # ntg prn  # also start imdur 30mg PO daily  # Anticipate DC first this tomorrow. Given MI, continue to monitor on tele overnight.       Thank you for  allowing me to participate in the care of this patient, I will continue to follow.  Please contact me with any questions.      Luis Angel Bliss MD  Cardiologist, [facility] [place]   [phone]

## 2022-08-20 NOTE — CARE PLAN
The patient is Stable - Low risk of patient condition declining or worsening    Shift Goals  Clinical Goals: hemodynamic stability, no chest pain    Progress made toward(s) clinical / shift goals:    Problem: Knowledge Deficit - Standard  Goal: Patient and family/care givers will demonstrate understanding of plan of care, disease process/condition, diagnostic tests and medications  Outcome: Progressing     Problem: Discharge Barriers/Planning  Goal: Patient's continuum of care needs are met  Outcome: Progressing     Problem: Hemodynamics  Goal: Patient's hemodynamics, fluid balance and neurologic status will be stable or improve  Outcome: Progressing       Patient is not progressing towards the following goals:

## 2022-08-20 NOTE — PROGRESS NOTES
Report received from [facility] shift RN, assumed patient care. Patient is calmly resting in bed, no signs of distress, even and unlabored breathing noted. Pt on 1L O2. Patient is medical. Plan of care discussed with patient, repeat back understanding obtained. Patient has call light within reach, fall precautions in place. Will continue to monitor.

## 2022-08-20 NOTE — DISCHARGE SUMMARY
Discharge Summary    CHIEF COMPLAINT ON ADMISSION  Chief Complaint   Patient presents with    Heartburn    Chest Pain       Reason for Admission  Chest Pain     Admission Date  8/18/2022    CODE STATUS  Full Code    HPI & HOSPITAL COURSE  This is a 66 y.o. female here with chest pain. Patient with history of paranoid schizophrenia presented with chest pain for 3 days. She was found to have EKG concerning for STEMI in inferior leads. She was taken for emergent left heart catheterization and angiogram. Cath revealed non obstructive coronary artery disease, no stents or angioplasty performed. She was monitored post cath with resolution of her chest pain. She was started on imdur and verapamil by cardiology team, and is to follow up with cardiology team as outpatient. Patient no longer with any chest pain.     Therefore, she is discharged in fair and stable condition to home with close outpatient follow-up.    The patient met 2-midnight criteria for an inpatient stay at the time of discharge.    Discharge Date  8/20/2022    FOLLOW UP ITEMS POST DISCHARGE  Take medications as prescribed.  Follow up with PCP and cardiology.    DISCHARGE DIAGNOSES  Principal Problem:    STEMI (ST elevation myocardial infarction) (HCC) POA: Yes  Active Problems:    Paranoid schizophrenia (HCC) POA: Yes    Hypercholesterolemia POA: Yes    GERD (gastroesophageal reflux disease) POA: Unknown  Resolved Problems:    * No resolved hospital problems. *      FOLLOW UP  Future Appointments   Date Time Provider Department Center   10/12/2022  3:30 PM Jerome Olivares M.D. [facility]  [address]  [address]  [facility] [id number] [phone]  Call  Your doctor has referred you for Cardiac Rehab, which is important for your recovery. Please call to make an appointment.      MEDICATIONS ON DISCHARGE     Medication List        START taking these medications        Instructions   Aspirin Low Dose 81  MG Chew chewable tablet  Start taking on: August 21, 2022  Generic drug: aspirin   Chew 1 Tablet every day.  Dose: 81 mg     atorvastatin 80 MG tablet  Commonly known as: LIPITOR   Take 1 Tablet by mouth every evening.  Dose: 80 mg     isosorbide mononitrate SR 30 MG Tb24  Start taking on: August 21, 2022  Commonly known as: IMDUR   Take 1 Tablet by mouth every day.  Dose: 30 mg     omeprazole 20 MG delayed-release capsule  Start taking on: August 21, 2022  Commonly known as: PRILOSEC   Take 1 Capsule by mouth every day.  Dose: 20 mg     verapamil  MG CR tablet  Start taking on: August 21, 2022  Commonly known as: CALAN-SR   Take 1 Tablet by mouth every morning with breakfast.  Dose: 120 mg            CONTINUE taking these medications        Instructions   Asenapine Maleate 10 MG Subl   Place  under the tongue. Place 1 tab under tongue twice a day     Calcium + Vitamin D3 600-200 MG-UNIT Tabs  Generic drug: Calcium Carbonate+Vitamin D   TAKE 1 TABLET BY MOUTH ONCE DAILY     cloZAPine 100 MG Tabs  Commonly known as: CLOZARIL   Take 100 mg by mouth 2 times a day. 200MG in Am and 300MG HS  Indications: two tabs every morning and 4 tabs every bedtime  Dose: 100 mg     Fish Oil Concentrate 300 MG Caps   Take  by mouth.     Multivitamin Tabs   TAKE 1 TABLET BY MOUTH ONCE DAILY     polyethylene glycol/lytes 17 g Pack  Commonly known as: MIRALAX   Take 17 g by mouth every bedtime.  Dose: 17 g     Stool Softener 240 MG Caps  Generic drug: docusate calcium   Take 1 Capsule by mouth every day.  Dose: 240 mg     therapeutic multivitamin-minerals Tabs   Take 1 Tab by mouth every day.  Dose: 1 Tablet              Allergies  Allergies   Allergen Reactions    Azithromycin     Fluphenazine      hallucinations    Haldol [Haloperidol]     Erythromycin      Pt on Clozapine/no erythromycin    Haldol [Haloperidol Lactate]      Does not tolerate       DIET  Orders Placed This Encounter   Procedures    Diet Order Diet: Cardiac      Standing Status:   Standing     Number of Occurrences:   1     Order Specific Question:   Diet:     Answer:   Cardiac [6]       ACTIVITY  As tolerated.  Weight bearing as tolerated    CONSULTATIONS  cardiology    PROCEDURES  Left heart catheterization and arteriograms 8/18    LABORATORY  Lab Results   Component Value Date    SODIUM 136 08/18/2022    POTASSIUM 4.0 08/18/2022    CHLORIDE 99 08/18/2022    CO2 25 08/18/2022    GLUCOSE 176 (H) 08/18/2022    BUN 19 08/18/2022    CREATININE 1.31 08/18/2022        Lab Results   Component Value Date    WBC 8.3 08/18/2022    HEMOGLOBIN 14.8 08/18/2022    HEMATOCRIT 44.3 08/18/2022    PLATELETCT 262 08/18/2022        Total time of the discharge process exceeds 36 minutes.

## 2022-09-13 ENCOUNTER — OFFICE VISIT (OUTPATIENT)
Dept: CARDIOLOGY | Facility: MEDICAL CENTER | Age: 67
End: 2022-09-13
Attending: INTERNAL MEDICINE
Payer: MEDICARE

## 2022-09-13 VITALS
DIASTOLIC BLOOD PRESSURE: 60 MMHG | BODY MASS INDEX: 27.64 KG/M2 | HEART RATE: 74 BPM | WEIGHT: 156 LBS | OXYGEN SATURATION: 95 % | RESPIRATION RATE: 16 BRPM | SYSTOLIC BLOOD PRESSURE: 100 MMHG | HEIGHT: 63 IN

## 2022-09-13 DIAGNOSIS — I25.2 OLD MYOCARDIAL INFARCTION: ICD-10-CM

## 2022-09-13 DIAGNOSIS — E78.00 HYPERCHOLESTEROLEMIA: ICD-10-CM

## 2022-09-13 DIAGNOSIS — I25.10 CORONARY ARTERY DISEASE INVOLVING NATIVE CORONARY ARTERY OF NATIVE HEART WITHOUT ANGINA PECTORIS: ICD-10-CM

## 2022-09-13 PROCEDURE — 99214 OFFICE O/P EST MOD 30 MIN: CPT | Performed by: NURSE PRACTITIONER

## 2022-09-13 RX ORDER — ISOSORBIDE MONONITRATE 30 MG/1
30 TABLET, EXTENDED RELEASE ORAL DAILY
Qty: 90 TABLET | Refills: 3 | Status: SHIPPED | OUTPATIENT
Start: 2022-09-13 | End: 2022-12-13

## 2022-09-13 RX ORDER — ATORVASTATIN CALCIUM 80 MG/1
80 TABLET, FILM COATED ORAL EVERY EVENING
Qty: 90 TABLET | Refills: 3 | Status: SHIPPED | OUTPATIENT
Start: 2022-09-13 | End: 2022-12-13 | Stop reason: SDUPTHER

## 2022-09-13 RX ORDER — ASPIRIN 81 MG/1
81 TABLET, CHEWABLE ORAL DAILY
Qty: 90 TABLET | Refills: 3 | Status: SHIPPED | OUTPATIENT
Start: 2022-09-13 | End: 2023-05-08

## 2022-09-13 ASSESSMENT — ENCOUNTER SYMPTOMS
SHORTNESS OF BREATH: 0
ORTHOPNEA: 0
MYALGIAS: 0
DIZZINESS: 0
FEVER: 0
CLAUDICATION: 0
PND: 0
ABDOMINAL PAIN: 0
PALPITATIONS: 0
COUGH: 0

## 2022-09-13 ASSESSMENT — FIBROSIS 4 INDEX: FIB4 SCORE: 2.12

## 2022-09-13 NOTE — PATIENT INSTRUCTIONS
All heart medications have been sent to your pharmacy for 1 year.    You will need labs in 3 months prior to next apt with Cardiologist Dr. Jacob.

## 2022-09-13 NOTE — PROGRESS NOTES
Chief Complaint   Patient presents with    MI (Non ST Segment Elevation MI)     NP Dx: ST elevation myocardial infarction (STEMI), unspecified artery (HCC)     Subjective     Gena Dykes is a 66 y.o. female who presents today for hospital follow up for MINOCA.    She is a new patient to our office, will be established with Dr. Jacob [facility].    Hx of obesity, HLD, schizophrenia, GERD, constipation, cellulitis, breast CA (remission), and now MINOCA.    She presents today with her caregiver. She lives at a group home. She is a poor historian. She has no symptoms to report but is frustrated with her bowel movements now.    She has been walking and doing exercises and is doing well with this with no symptoms to report.    She has no chest pain, shortness of breath, edema, dizziness/lightheadedness, or palpitations.    History reviewed. No pertinent past medical history.  Past Surgical History:   Procedure Laterality Date    MASS EXCISION GENERAL  7/18/2017    Procedure: ;  Surgeon: Brock Colin M.D.;  Location: SURGERY [facility];  Service:     LUMPECTOMY Left      Family History   Problem Relation Age of Onset    Heart Disease Mother      Social History     Socioeconomic History    Marital status: Single     Spouse name: Not on file    Number of children: Not on file    Years of education: Not on file    Highest education level: Not on file   Occupational History    Not on file   Tobacco Use    Smoking status: Former     Types: Cigarettes     Quit date: 12/12/2011     Years since quitting: 10.7    Smokeless tobacco: Never    Tobacco comments:     smoked for 45 years   Vaping Use    Vaping Use: Never used   Substance and Sexual Activity    Alcohol use: No     Alcohol/week: 0.0 oz    Drug use: No    Sexual activity: Not on file   Other Topics Concern    Not on file   Social History Narrative    Not on file     Social Determinants of Health     Financial Resource Strain: Not on file    Food Insecurity: Not on file   Transportation Needs: Not on file   Physical Activity: Not on file   Stress: Not on file   Social Connections: Not on file   Intimate Partner Violence: Not on file   Housing Stability: Not on file     Allergies   Allergen Reactions    Azithromycin     Fluphenazine      hallucinations    Haldol [Haloperidol]     Erythromycin      Pt on Clozapine/no erythromycin    Haldol [Haloperidol Lactate]      Does not tolerate     Outpatient Encounter Medications as of 9/13/2022   Medication Sig Dispense Refill    atorvastatin (LIPITOR) 80 MG tablet Take 1 Tablet by mouth every evening. 90 Tablet 3    aspirin (ASA) 81 MG Chew Tab chewable tablet Chew 1 Tablet every day. 90 Tablet 3    isosorbide mononitrate SR (IMDUR) 30 MG TABLET SR 24 HR Take 1 Tablet by mouth every day. 90 Tablet 3    verapamil SR (CALAN-SR) 120 MG CR tablet Take 1 Tablet by mouth every morning with breakfast. 90 Tablet 3    omeprazole (PRILOSEC) 20 MG delayed-release capsule Take 1 Capsule by mouth every day. 30 Capsule 0    CALCIUM + VITAMIN D3 600-5 MG-MCG Tab TAKE 1 TABLET BY MOUTH ONCE DAILY 90 Tablet 3    Multiple Vitamin (MULTIVITAMIN) Tab TAKE 1 TABLET BY MOUTH ONCE DAILY 30 Tablet 11    STOOL SOFTENER 240 MG Cap Take 1 Capsule by mouth every day. 90 Capsule 1    Asenapine Maleate 10 MG SL Tab Place  under the tongue. Place 1 tab under tongue twice a day      Omega-3 Fatty Acids (FISH OIL CONCENTRATE) 300 MG Cap Take  by mouth.      polyethylene glycol/lytes (MIRALAX) Pack Take 17 g by mouth every bedtime.      clozapine (CLOZARIL) 100 MG TABS Take 100 mg by mouth 2 times a day. 200MG in Am and 300MG HS  Indications: two tabs every morning and 4 tabs every bedtime      therapeutic multivitamin-minerals (THERAGRAN-M) TABS Take 1 Tab by mouth every day.      [DISCONTINUED] aspirin (ASA) 81 MG Chew Tab chewable tablet Chew 1 Tablet every day. 100 Tablet 0    [DISCONTINUED] atorvastatin (LIPITOR) 80 MG tablet Take 1 Tablet  "by mouth every evening. 30 Tablet 0    [DISCONTINUED] isosorbide mononitrate SR (IMDUR) 30 MG TABLET SR 24 HR Take 1 Tablet by mouth every day. 30 Tablet 0    [DISCONTINUED] verapamil SR (CALAN-SR) 120 MG CR tablet Take 1 Tablet by mouth every morning with breakfast. 30 Tablet 3     No facility-administered encounter medications on file as of 9/13/2022.     Review of Systems   Constitutional:  Negative for fever and malaise/fatigue.   Respiratory:  Negative for cough and shortness of breath.    Cardiovascular:  Negative for chest pain, palpitations, orthopnea, claudication, leg swelling and PND.   Gastrointestinal:  Negative for abdominal pain.   Musculoskeletal:  Negative for myalgias.   Neurological:  Negative for dizziness.            Objective     /60 (BP Location: Left arm, Patient Position: Sitting, BP Cuff Size: Adult)   Pulse 74   Resp 16   Ht 1.6 m (5' 3\")   Wt 70.8 kg (156 lb)   LMP 07/03/2000 (Approximate)   SpO2 95%   BMI 27.63 kg/m²     Physical Exam  Vitals and nursing note reviewed.   Constitutional:       Appearance: Normal appearance. She is well-developed. She is obese.   HENT:      Head: Normocephalic and atraumatic.   Neck:      Vascular: No JVD.   Cardiovascular:      Rate and Rhythm: Normal rate and regular rhythm.      Pulses: Normal pulses.      Heart sounds: Normal heart sounds.   Pulmonary:      Effort: Pulmonary effort is normal.      Breath sounds: Normal breath sounds.   Musculoskeletal:         General: Normal range of motion.   Skin:     General: Skin is warm and dry.      Capillary Refill: Capillary refill takes less than 2 seconds.   Neurological:      General: No focal deficit present.      Mental Status: She is alert and oriented to person, place, and time. Mental status is at baseline.   Psychiatric:         Mood and Affect: Mood normal.         Behavior: Behavior normal.         Thought Content: Thought content normal.         Judgment: Judgment normal.        "       Assessment & Plan     1. Coronary artery disease involving native coronary artery of native heart without angina pectoris  Lipid Profile    Basic Metabolic Panel      2. Old myocardial infarction  atorvastatin (LIPITOR) 80 MG tablet    aspirin (ASA) 81 MG Chew Tab chewable tablet    isosorbide mononitrate SR (IMDUR) 30 MG TABLET SR 24 HR    verapamil SR (CALAN-SR) 120 MG CR tablet      3. Hypercholesterolemia  Lipid Profile    Basic Metabolic Panel        Medical Decision Making: Today's Assessment/Status/Plan:      1. HLD with MINOCA  -no further angina or LOPEZ  -cont asa, statin lifelong  -cont imdur and verapamil until next apt, discuss further need  -repeat lipid/cmp before next apt  -educational handouts on diet/exercise/CAD given to caregiver    Patient is to follow up with Dr. Jacob in 3 months with labs.

## 2022-09-15 ENCOUNTER — OFFICE VISIT (OUTPATIENT)
Dept: MEDICAL GROUP | Facility: CLINIC | Age: 67
End: 2022-09-15
Payer: MEDICARE

## 2022-09-15 VITALS
WEIGHT: 154.7 LBS | BODY MASS INDEX: 27.41 KG/M2 | HEART RATE: 96 BPM | OXYGEN SATURATION: 97 % | HEIGHT: 63 IN | SYSTOLIC BLOOD PRESSURE: 96 MMHG | DIASTOLIC BLOOD PRESSURE: 68 MMHG

## 2022-09-15 DIAGNOSIS — I25.10 CORONARY ARTERY DISEASE INVOLVING NATIVE CORONARY ARTERY OF NATIVE HEART WITHOUT ANGINA PECTORIS: ICD-10-CM

## 2022-09-15 DIAGNOSIS — Z09 HOSPITAL DISCHARGE FOLLOW-UP: ICD-10-CM

## 2022-09-15 DIAGNOSIS — Z12.31 SCREENING MAMMOGRAM FOR BREAST CANCER: ICD-10-CM

## 2022-09-15 DIAGNOSIS — Z12.11 SCREENING FOR COLON CANCER: ICD-10-CM

## 2022-09-15 PROCEDURE — 99214 OFFICE O/P EST MOD 30 MIN: CPT | Mod: GC

## 2022-09-15 ASSESSMENT — FIBROSIS 4 INDEX: FIB4 SCORE: 2.12

## 2022-09-15 NOTE — PROGRESS NOTES
CC:Diagnoses of Hospital discharge follow-up, Coronary artery disease involving native coronary artery of native heart without angina pectoris, Screening mammogram for breast cancer, and Screening for colon cancer were pertinent to this visit.    HISTORY OF PRESENT ILLNESS: Patient is a 66 y.o. female established patient who presents today to hospital follow-up with her caregiver, Radha.    Hospital discharge follow-up  Patient presented to the hospital with a 3 day history of CP, EKG STEMI in inferior leads. Emergent Lf heart cath and angiogram which revealed non-obstructive CAD. No stents or angioplasty required. Started on Imdur and verapamil.  -Continue Imdur 30mg and verapamil 120mg  -f/u with cardiology     Coronary artery disease involving native coronary artery of native heart without angina pectoris  Patient f/u with cardiology on 9/13. BMP and lipid profile ordered for f/u appointment in 3 months. Patient told to continue taking: atorvastatin 80mg, aspirin 81mg chewable tablet, Imdur 30mg and verapamil 120mg daily.  -Managed by cardiology    Screening for colon cancer  Patient has never been screened for colon cancer.  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 Last mammogram in 1/2022, not having any symptoms.  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 90 Tablet 3    aspirin (ASA) 81 MG Chew Tab chewable tablet Chew 1 Tablet every day. 90 Tablet 3    isosorbide mononitrate SR (IMDUR) 30 MG TABLET SR 24 HR Take 1 Tablet by mouth every day. 90 Tablet 3    verapamil SR (CALAN-SR) 120 MG CR tablet Take 1 Tablet by mouth every morning with breakfast. 90 Tablet 3    omeprazole (PRILOSEC) 20 MG delayed-release capsule Take 1 Capsule by mouth every day. 30 Capsule 0    CALCIUM + VITAMIN D3 600-5 MG-MCG Tab TAKE 1 TABLET BY MOUTH ONCE DAILY 90 Tablet 3    Multiple Vitamin (MULTIVITAMIN) Tab TAKE 1 TABLET BY MOUTH ONCE DAILY 30 Tablet 11    STOOL SOFTENER 240 MG Cap Take 1 Capsule by mouth every day. 90 Capsule 1    Asenapine Maleate 10 MG SL Tab Place  under the tongue. Place 1 tab under tongue twice a day      Omega-3 Fatty Acids (FISH OIL CONCENTRATE) 300 MG Cap Take  by mouth.      polyethylene glycol/lytes (MIRALAX) Pack Take 17 g by mouth every bedtime.      clozapine (CLOZARIL) 100 MG TABS Take 100 mg by mouth 2 times a day. 200MG in Am and 300MG HS  Indications: two tabs every morning and 4 tabs every bedtime      therapeutic multivitamin-minerals (THERAGRAN-M) TABS Take 1 Tab by mouth every day.       No current facility-administered medications for this visit.     Social History     Tobacco Use    Smoking status: Former     Types: Cigarettes     Quit date: 12/12/2011     Years since quitting: 10.7    Smokeless tobacco: Never    Tobacco comments:     smoked for 45 years   Vaping Use    Vaping Use: Never used   Substance Use Topics    Alcohol use: No     Alcohol/week: 0.0  "oz    Drug use: No     Social History     Social History Narrative    Not on file       Family History   Problem Relation Age of Onset    Heart Disease Mother        Exam:    BP (!) 96/68 (BP Location: Right arm, Patient Position: Sitting, BP Cuff Size: Adult)   Pulse 96   Ht 1.6 m (5' 3\")   Wt 70.2 kg (154 lb 11.2 oz)   SpO2 97%  There is no height or weight on file to calculate BMI.    General:  Well nourished, well developed female in NAD  HENT: Atraumatic, normocephalic  EYES: Extraocular movements intact, pupils equal and reactive to light  NECK: Supple, FROM  CHEST: No deformities, Equal chest expansion  RESP: Unlabored, no stridor or audible wheeze  ABD: Non-Distended  Extremities: No Clubbing, Cyanosis, or Edema  Skin: Warm/dry, without rashes  Neuro: A/O x 4, CN 2-12 Grossly intact, Motor/sensory grossly intact  Psych: Normal behavior, normal affect    LABS:  Results reviewed and discussed with the patient, questions answered.    Return in about 3 months (around 12/15/2022).    My total time spent caring for the patient on the day of the encounter was 30 minutes.   This does not include time spent on separately billable procedures/tests.    "

## 2022-09-15 NOTE — ASSESSMENT & PLAN NOTE
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 Last mammogram in 1/2022, not having any symptoms.  -Ordered mammogram for 1/2023

## 2022-09-15 NOTE — ASSESSMENT & PLAN NOTE
Patient presented to the hospital with a 3 day history of CP, EKG STEMI in inferior leads. Emergent Lf heart cath and angiogram which revealed non-obstructive CAD. No stents or angioplasty required. Started on Imdur and verapamil.  -Continue Imdur 30mg and verapamil 120mg  -f/u with cardiology

## 2022-09-15 NOTE — ASSESSMENT & PLAN NOTE
Patient f/u with cardiology on 9/13. BMP and lipid profile ordered for f/u appointment in 3 months. Patient told to continue taking: atorvastatin 80mg, aspirin 81mg chewable tablet, Imdur 30mg and verapamil 120mg daily.  -Managed by cardiology

## 2022-09-30 NOTE — TELEPHONE ENCOUNTER
Last seen: 12.16.2021 by Dr. Zamora  Next appt: none    Was the patient seen in the last year in this department? Yes   Does patient have an active prescription for medications requested? No   Received Request Via: Pharmacy

## 2022-10-03 ENCOUNTER — HOSPITAL ENCOUNTER (OUTPATIENT)
Dept: RADIOLOGY | Facility: MEDICAL CENTER | Age: 67
End: 2022-10-03
Payer: MEDICARE

## 2022-10-03 RX ORDER — DOCUSATE CALCIUM 240 MG
CAPSULE ORAL
Qty: 90 CAPSULE | Refills: 2 | Status: SHIPPED | OUTPATIENT
Start: 2022-10-03 | End: 2023-10-05

## 2022-10-12 ENCOUNTER — APPOINTMENT (OUTPATIENT)
Dept: MEDICAL GROUP | Facility: CLINIC | Age: 67
End: 2022-10-12
Payer: MEDICARE

## 2022-10-18 ENCOUNTER — HOSPITAL ENCOUNTER (OUTPATIENT)
Dept: RADIOLOGY | Facility: MEDICAL CENTER | Age: 67
End: 2022-10-18
Payer: MEDICARE

## 2022-10-18 DIAGNOSIS — Z12.31 SCREENING MAMMOGRAM FOR BREAST CANCER: ICD-10-CM

## 2022-10-18 PROCEDURE — 77063 BREAST TOMOSYNTHESIS BI: CPT

## 2022-10-26 ENCOUNTER — HOSPITAL ENCOUNTER (OUTPATIENT)
Facility: MEDICAL CENTER | Age: 67
End: 2022-10-27
Attending: EMERGENCY MEDICINE | Admitting: STUDENT IN AN ORGANIZED HEALTH CARE EDUCATION/TRAINING PROGRAM
Payer: MEDICARE

## 2022-10-26 ENCOUNTER — APPOINTMENT (OUTPATIENT)
Dept: RADIOLOGY | Facility: MEDICAL CENTER | Age: 67
End: 2022-10-26
Attending: EMERGENCY MEDICINE
Payer: MEDICARE

## 2022-10-26 DIAGNOSIS — R40.4 ALTERED LEVEL OF CONSCIOUSNESS: ICD-10-CM

## 2022-10-26 DIAGNOSIS — R41.0 CONFUSION: ICD-10-CM

## 2022-10-26 DIAGNOSIS — R79.89 ELEVATED LACTIC ACID LEVEL: ICD-10-CM

## 2022-10-26 PROBLEM — R55 NEAR SYNCOPE: Status: ACTIVE | Noted: 2022-10-26

## 2022-10-26 PROBLEM — E86.0 DEHYDRATION: Status: ACTIVE | Noted: 2022-10-26

## 2022-10-26 PROBLEM — R56.9 SEIZURE-LIKE ACTIVITY (HCC): Status: ACTIVE | Noted: 2022-10-26
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
## 2022-10-26 LAB
ALBUMIN SERPL BCP-MCNC: 3.8 G/DL (ref 3.2–4.9)
ALBUMIN/GLOB SERPL: 1.6 G/DL
ALP SERPL-CCNC: 91 U/L (ref 30–99)
ALT SERPL-CCNC: 17 U/L (ref 2–50)
AMPHET UR QL SCN: NEGATIVE
ANION GAP SERPL CALC-SCNC: 11 MMOL/L (ref 7–16)
APPEARANCE UR: CLEAR
AST SERPL-CCNC: 30 U/L (ref 12–45)
BARBITURATES UR QL SCN: NEGATIVE
BASOPHILS # BLD AUTO: 0.5 % (ref 0–1.8)
BASOPHILS # BLD: 0.03 K/UL (ref 0–0.12)
BENZODIAZ UR QL SCN: NEGATIVE
BILIRUB SERPL-MCNC: 0.3 MG/DL (ref 0.1–1.5)
BILIRUB UR QL STRIP.AUTO: NEGATIVE
BLOOD CULTURE HOLD CXBCH: NORMAL
BLOOD CULTURE HOLD CXBCH: NORMAL
BUN SERPL-MCNC: 12 MG/DL (ref 8–22)
BZE UR QL SCN: NEGATIVE
CALCIUM SERPL-MCNC: 9.1 MG/DL (ref 8.5–10.5)
CANNABINOIDS UR QL SCN: NEGATIVE
CHLORIDE SERPL-SCNC: 98 MMOL/L (ref 96–112)
CO2 SERPL-SCNC: 25 MMOL/L (ref 20–33)
COLOR UR: YELLOW
CREAT SERPL-MCNC: 0.78 MG/DL (ref 0.5–1.4)
EKG IMPRESSION: NORMAL
EOSINOPHIL # BLD AUTO: 0.18 K/UL (ref 0–0.51)
EOSINOPHIL NFR BLD: 3.1 % (ref 0–6.9)
ERYTHROCYTE [DISTWIDTH] IN BLOOD BY AUTOMATED COUNT: 53.4 FL (ref 35.9–50)
GFR SERPLBLD CREATININE-BSD FMLA CKD-EPI: 84 ML/MIN/1.73 M 2
GLOBULIN SER CALC-MCNC: 2.4 G/DL (ref 1.9–3.5)
GLUCOSE SERPL-MCNC: 124 MG/DL (ref 65–99)
GLUCOSE UR STRIP.AUTO-MCNC: NEGATIVE MG/DL
HCT VFR BLD AUTO: 39.6 % (ref 37–47)
HGB BLD-MCNC: 13 G/DL (ref 12–16)
IMM GRANULOCYTES # BLD AUTO: 0.03 K/UL (ref 0–0.11)
IMM GRANULOCYTES NFR BLD AUTO: 0.5 % (ref 0–0.9)
KETONES UR STRIP.AUTO-MCNC: NEGATIVE MG/DL
LACTATE SERPL-SCNC: 1.2 MMOL/L (ref 0.5–2)
LACTATE SERPL-SCNC: 2.2 MMOL/L (ref 0.5–2)
LACTATE SERPL-SCNC: 2.3 MMOL/L (ref 0.5–2)
LEUKOCYTE ESTERASE UR QL STRIP.AUTO: NEGATIVE
LYMPHOCYTES # BLD AUTO: 0.9 K/UL (ref 1–4.8)
LYMPHOCYTES NFR BLD: 15.5 % (ref 22–41)
MCH RBC QN AUTO: 30.5 PG (ref 27–33)
MCHC RBC AUTO-ENTMCNC: 32.8 G/DL (ref 33.6–35)
MCV RBC AUTO: 93 FL (ref 81.4–97.8)
METHADONE UR QL SCN: NEGATIVE
MICRO URNS: NORMAL
MONOCYTES # BLD AUTO: 0.36 K/UL (ref 0–0.85)
MONOCYTES NFR BLD AUTO: 6.2 % (ref 0–13.4)
NEUTROPHILS # BLD AUTO: 4.3 K/UL (ref 2–7.15)
NEUTROPHILS NFR BLD: 74.2 % (ref 44–72)
NITRITE UR QL STRIP.AUTO: NEGATIVE
NRBC # BLD AUTO: 0 K/UL
NRBC BLD-RTO: 0 /100 WBC
OPIATES UR QL SCN: NEGATIVE
OXYCODONE UR QL SCN: NEGATIVE
PCP UR QL SCN: NEGATIVE
PH UR STRIP.AUTO: 6 [PH] (ref 5–8)
PLATELET # BLD AUTO: 214 K/UL (ref 164–446)
PMV BLD AUTO: 11 FL (ref 9–12.9)
POTASSIUM SERPL-SCNC: 3.9 MMOL/L (ref 3.6–5.5)
PROPOXYPH UR QL SCN: NEGATIVE
PROT SERPL-MCNC: 6.2 G/DL (ref 6–8.2)
PROT UR QL STRIP: NEGATIVE MG/DL
RBC # BLD AUTO: 4.26 M/UL (ref 4.2–5.4)
RBC UR QL AUTO: NEGATIVE
SODIUM SERPL-SCNC: 134 MMOL/L (ref 135–145)
SP GR UR STRIP.AUTO: 1.01
UROBILINOGEN UR STRIP.AUTO-MCNC: 0.2 MG/DL
WBC # BLD AUTO: 5.8 K/UL (ref 4.8–10.8)

## 2022-10-26 PROCEDURE — G0378 HOSPITAL OBSERVATION PER HR: HCPCS

## 2022-10-26 PROCEDURE — 81003 URINALYSIS AUTO W/O SCOPE: CPT | Mod: XU

## 2022-10-26 PROCEDURE — 99285 EMERGENCY DEPT VISIT HI MDM: CPT

## 2022-10-26 PROCEDURE — 99214 OFFICE O/P EST MOD 30 MIN: CPT | Performed by: STUDENT IN AN ORGANIZED HEALTH CARE EDUCATION/TRAINING PROGRAM

## 2022-10-26 PROCEDURE — 96372 THER/PROPH/DIAG INJ SC/IM: CPT

## 2022-10-26 PROCEDURE — A9270 NON-COVERED ITEM OR SERVICE: HCPCS | Performed by: STUDENT IN AN ORGANIZED HEALTH CARE EDUCATION/TRAINING PROGRAM

## 2022-10-26 PROCEDURE — 700105 HCHG RX REV CODE 258: Performed by: EMERGENCY MEDICINE

## 2022-10-26 PROCEDURE — 85025 COMPLETE CBC W/AUTO DIFF WBC: CPT

## 2022-10-26 PROCEDURE — 36415 COLL VENOUS BLD VENIPUNCTURE: CPT

## 2022-10-26 PROCEDURE — 70450 CT HEAD/BRAIN W/O DYE: CPT

## 2022-10-26 PROCEDURE — 93005 ELECTROCARDIOGRAM TRACING: CPT | Performed by: EMERGENCY MEDICINE

## 2022-10-26 PROCEDURE — 700102 HCHG RX REV CODE 250 W/ 637 OVERRIDE(OP): Performed by: STUDENT IN AN ORGANIZED HEALTH CARE EDUCATION/TRAINING PROGRAM

## 2022-10-26 PROCEDURE — 80053 COMPREHEN METABOLIC PANEL: CPT

## 2022-10-26 PROCEDURE — 83605 ASSAY OF LACTIC ACID: CPT | Mod: 91

## 2022-10-26 PROCEDURE — 80307 DRUG TEST PRSMV CHEM ANLYZR: CPT

## 2022-10-26 PROCEDURE — 99204 OFFICE O/P NEW MOD 45 MIN: CPT | Performed by: PSYCHIATRY & NEUROLOGY

## 2022-10-26 PROCEDURE — 700111 HCHG RX REV CODE 636 W/ 250 OVERRIDE (IP): Performed by: STUDENT IN AN ORGANIZED HEALTH CARE EDUCATION/TRAINING PROGRAM

## 2022-10-26 RX ORDER — ONDANSETRON 4 MG/1
4 TABLET, ORALLY DISINTEGRATING ORAL EVERY 4 HOURS PRN
Status: DISCONTINUED | OUTPATIENT
Start: 2022-10-26 | End: 2022-10-27 | Stop reason: HOSPADM

## 2022-10-26 RX ORDER — OXYCODONE HYDROCHLORIDE 5 MG/1
5 TABLET ORAL
Status: DISCONTINUED | OUTPATIENT
Start: 2022-10-26 | End: 2022-10-27 | Stop reason: HOSPADM

## 2022-10-26 RX ORDER — SODIUM CHLORIDE, SODIUM LACTATE, POTASSIUM CHLORIDE, AND CALCIUM CHLORIDE .6; .31; .03; .02 G/100ML; G/100ML; G/100ML; G/100ML
30 INJECTION, SOLUTION INTRAVENOUS ONCE
Status: COMPLETED | OUTPATIENT
Start: 2022-10-26 | End: 2022-10-26

## 2022-10-26 RX ORDER — CLOZAPINE 100 MG/1
300 TABLET ORAL EVERY EVENING
Status: DISCONTINUED | OUTPATIENT
Start: 2022-10-26 | End: 2022-10-27 | Stop reason: HOSPADM

## 2022-10-26 RX ORDER — CHLORAL HYDRATE 500 MG
1000 CAPSULE ORAL 2 TIMES DAILY
COMMUNITY

## 2022-10-26 RX ORDER — CLOZAPINE 100 MG/1
100 TABLET ORAL DAILY
Status: DISCONTINUED | OUTPATIENT
Start: 2022-10-27 | End: 2022-10-27 | Stop reason: HOSPADM

## 2022-10-26 RX ORDER — AMOXICILLIN 250 MG
2 CAPSULE ORAL 2 TIMES DAILY
Status: DISCONTINUED | OUTPATIENT
Start: 2022-10-26 | End: 2022-10-27 | Stop reason: HOSPADM

## 2022-10-26 RX ORDER — ASPIRIN 81 MG/1
81 TABLET, CHEWABLE ORAL DAILY
Status: DISCONTINUED | OUTPATIENT
Start: 2022-10-27 | End: 2022-10-27 | Stop reason: HOSPADM

## 2022-10-26 RX ORDER — HYDROMORPHONE HYDROCHLORIDE 1 MG/ML
0.25 INJECTION, SOLUTION INTRAMUSCULAR; INTRAVENOUS; SUBCUTANEOUS
Status: DISCONTINUED | OUTPATIENT
Start: 2022-10-26 | End: 2022-10-27 | Stop reason: HOSPADM

## 2022-10-26 RX ORDER — ACETAMINOPHEN 325 MG/1
650 TABLET ORAL EVERY 6 HOURS PRN
Status: DISCONTINUED | OUTPATIENT
Start: 2022-10-26 | End: 2022-10-27 | Stop reason: HOSPADM

## 2022-10-26 RX ORDER — ONDANSETRON 2 MG/ML
4 INJECTION INTRAMUSCULAR; INTRAVENOUS EVERY 4 HOURS PRN
Status: DISCONTINUED | OUTPATIENT
Start: 2022-10-26 | End: 2022-10-27 | Stop reason: HOSPADM

## 2022-10-26 RX ORDER — ATORVASTATIN CALCIUM 80 MG/1
80 TABLET, FILM COATED ORAL EVERY EVENING
Status: DISCONTINUED | OUTPATIENT
Start: 2022-10-26 | End: 2022-10-27 | Stop reason: HOSPADM

## 2022-10-26 RX ORDER — OXYCODONE HYDROCHLORIDE 5 MG/1
2.5 TABLET ORAL
Status: DISCONTINUED | OUTPATIENT
Start: 2022-10-26 | End: 2022-10-27 | Stop reason: HOSPADM

## 2022-10-26 RX ORDER — VERAPAMIL HYDROCHLORIDE 240 MG/1
120 TABLET, FILM COATED, EXTENDED RELEASE ORAL
Status: DISCONTINUED | OUTPATIENT
Start: 2022-10-27 | End: 2022-10-27 | Stop reason: HOSPADM

## 2022-10-26 RX ORDER — CLOZAPINE 100 MG/1
200 TABLET ORAL DAILY
Status: DISCONTINUED | OUTPATIENT
Start: 2022-10-27 | End: 2022-10-27 | Stop reason: HOSPADM

## 2022-10-26 RX ORDER — POLYETHYLENE GLYCOL 3350 17 G/17G
1 POWDER, FOR SOLUTION ORAL
Status: DISCONTINUED | OUTPATIENT
Start: 2022-10-26 | End: 2022-10-27 | Stop reason: HOSPADM

## 2022-10-26 RX ORDER — BISACODYL 10 MG
10 SUPPOSITORY, RECTAL RECTAL
Status: DISCONTINUED | OUTPATIENT
Start: 2022-10-26 | End: 2022-10-27 | Stop reason: HOSPADM

## 2022-10-26 RX ORDER — LABETALOL HYDROCHLORIDE 5 MG/ML
10 INJECTION, SOLUTION INTRAVENOUS EVERY 4 HOURS PRN
Status: DISCONTINUED | OUTPATIENT
Start: 2022-10-26 | End: 2022-10-27 | Stop reason: HOSPADM

## 2022-10-26 RX ORDER — ENOXAPARIN SODIUM 100 MG/ML
40 INJECTION SUBCUTANEOUS DAILY
Status: DISCONTINUED | OUTPATIENT
Start: 2022-10-26 | End: 2022-10-27 | Stop reason: HOSPADM

## 2022-10-26 RX ORDER — CLOZAPINE 100 MG/1
100-300 TABLET ORAL 2 TIMES DAILY
Status: DISCONTINUED | OUTPATIENT
Start: 2022-10-26 | End: 2022-10-26

## 2022-10-26 RX ORDER — ASENAPINE 10 MG/1
1 TABLET SUBLINGUAL 2 TIMES DAILY
Status: DISCONTINUED | OUTPATIENT
Start: 2022-10-26 | End: 2022-10-26

## 2022-10-26 RX ORDER — ISOSORBIDE MONONITRATE 30 MG/1
30 TABLET, EXTENDED RELEASE ORAL DAILY
Status: DISCONTINUED | OUTPATIENT
Start: 2022-10-27 | End: 2022-10-27 | Stop reason: HOSPADM

## 2022-10-26 RX ADMIN — ATORVASTATIN CALCIUM 80 MG: 80 TABLET, FILM COATED ORAL at 22:07

## 2022-10-26 RX ADMIN — ENOXAPARIN SODIUM 40 MG: 40 INJECTION SUBCUTANEOUS at 22:08

## 2022-10-26 RX ADMIN — CLOZAPINE 300 MG: 100 TABLET ORAL at 22:06

## 2022-10-26 RX ADMIN — SENNOSIDES AND DOCUSATE SODIUM 2 TABLET: 50; 8.6 TABLET ORAL at 22:06

## 2022-10-26 RX ADMIN — SODIUM CHLORIDE, POTASSIUM CHLORIDE, SODIUM LACTATE AND CALCIUM CHLORIDE 2097 ML: 600; 310; 30; 20 INJECTION, SOLUTION INTRAVENOUS at 14:53

## 2022-10-26 ASSESSMENT — ENCOUNTER SYMPTOMS
SHORTNESS OF BREATH: 0
MEMORY LOSS: 0
ABDOMINAL PAIN: 0
SEIZURES: 0
CHILLS: 0
FOCAL WEAKNESS: 0
DOUBLE VISION: 0
DEPRESSION: 0
HEARTBURN: 0
SEIZURES: 1
BACK PAIN: 0
SENSORY CHANGE: 0
MYALGIAS: 0
NAUSEA: 0
NECK PAIN: 0
FEVER: 0
DIZZINESS: 1
SPEECH CHANGE: 0
TINGLING: 0
VOMITING: 0
HEADACHES: 0
FALLS: 1
TREMORS: 0
WEAKNESS: 1
BLURRED VISION: 0
LOSS OF CONSCIOUSNESS: 0
DIZZINESS: 0
FLANK PAIN: 0
PHOTOPHOBIA: 0
COUGH: 0
PALPITATIONS: 0
WEAKNESS: 0
BRUISES/BLEEDS EASILY: 0

## 2022-10-26 ASSESSMENT — LIFESTYLE VARIABLES
HAVE PEOPLE ANNOYED YOU BY CRITICIZING YOUR DRINKING: NO
TOTAL SCORE: 0
ON A TYPICAL DAY WHEN YOU DRINK ALCOHOL HOW MANY DRINKS DO YOU HAVE: 0
HOW MANY TIMES IN THE PAST YEAR HAVE YOU HAD 5 OR MORE DRINKS IN A DAY: 0
SUBSTANCE_ABUSE: 0
DO YOU DRINK ALCOHOL: NO
AVERAGE NUMBER OF DAYS PER WEEK YOU HAVE A DRINK CONTAINING ALCOHOL: 0
EVER FELT BAD OR GUILTY ABOUT YOUR DRINKING: NO
CONSUMPTION TOTAL: NEGATIVE
TOTAL SCORE: 0
TOTAL SCORE: 0
EVER HAD A DRINK FIRST THING IN THE MORNING TO STEADY YOUR NERVES TO GET RID OF A HANGOVER: NO
HAVE YOU EVER FELT YOU SHOULD CUT DOWN ON YOUR DRINKING: NO

## 2022-10-26 ASSESSMENT — FIBROSIS 4 INDEX
FIB4 SCORE: 2.12
FIB4 SCORE: 2.21

## 2022-10-26 ASSESSMENT — PATIENT HEALTH QUESTIONNAIRE - PHQ9
1. LITTLE INTEREST OR PLEASURE IN DOING THINGS: NOT AT ALL
SUM OF ALL RESPONSES TO PHQ9 QUESTIONS 1 AND 2: 0
2. FEELING DOWN, DEPRESSED, IRRITABLE, OR HOPELESS: NOT AT ALL

## 2022-10-26 NOTE — ED TRIAGE NOTES
"Chief Complaint   Patient presents with    Altered Mental Status     From group home, another resident witnessed pt going down to her knees from a chair, pt was then altered afterwards GCS 11, pt arrives as GCS 13, no injury. , moves all extremities equally, sensation intact, no droop noted. Hx schizophrenia, HTN, MI. Pt denies any pain. Oriented only to first name.      Pt BIB EMS for above complaints, VSS on 2L NC, GCS 13, NAD.    /71   Pulse 100   Temp 36.2 °C (97.2 °F) (Temporal)   Resp (!) 22   Ht 1.6 m (5' 3\")   Wt 69.9 kg (154 lb)   LMP 07/03/2000 (Approximate)   SpO2 95%   BMI 27.28 kg/m²     "

## 2022-10-26 NOTE — ED PROVIDER NOTES
ED Provider Note    CHIEF COMPLAINT  Chief Complaint   Patient presents with    Altered Mental Status     From group home, another resident witnessed pt going down to her knees from a chair, pt was then altered afterwards GCS 11, pt arrives as GCS 13, no injury. , moves all extremities equally, sensation intact, no droop noted. Hx schizophrenia, HTN, MI. Pt denies any pain. Oriented only to first name.        HPI  Gena Dykes is a 66 y.o. female with history of schizophrenia, CAD, and hypertension who presents from a group home via EMS for evaluation of altered mental state.    Per report, patient had a witnessed fall from a chair, going down to her knees and then was altered afterwards with a GCS of 11.  Blood glucose 139.  Possible seizure-like activity.    Per RN, patient arrived and had a GCS of 13.  Patient is clearer now than when she arrived.    Patient states she fell.  She denies any preceding chest pain, palpitations, headache, recent vomiting or diarrhea and feels well.  She states that she does not believe she had a seizure.      ALLERGIES  Allergies   Allergen Reactions    Azithromycin     Fluphenazine      hallucinations    Haldol [Haloperidol]     Erythromycin      Pt on Clozapine/no erythromycin    Haldol [Haloperidol Lactate]      Does not tolerate       CURRENT MEDICATIONS  Home Medications       Reviewed by Brett Neil R.N. (Registered Nurse) on 10/26/22 at 1249  Med List Status: <None>     Medication Last Dose Status   Asenapine Maleate 10 MG SL Tab  Active   aspirin (ASA) 81 MG Chew Tab chewable tablet  Active   atorvastatin (LIPITOR) 80 MG tablet  Active   CALCIUM + VITAMIN D3 600-5 MG-MCG Tab  Active   clozapine (CLOZARIL) 100 MG TABS  Active   docusate calcium (SURFAK) 240 MG Cap  Active   isosorbide mononitrate SR (IMDUR) 30 MG TABLET SR 24 HR  Active   Multiple Vitamin (MULTIVITAMIN) Tab  Active   Omega-3 Fatty Acids (FISH OIL CONCENTRATE) 300 MG Cap  Active   polyethylene  "glycol/lytes (MIRALAX) Pack  Active   therapeutic multivitamin-minerals (THERAGRAN-M) TABS  Active   verapamil SR (CALAN-SR) 120 MG CR tablet  Active                    PAST MEDICAL HISTORY     Schizophrenia  Hypertension  Dyslipidemia    SURGICAL HISTORY   has a past surgical history that includes lumpectomy (Left) and mass excision general (7/18/2017).    SOCIAL HISTORY  Social History     Tobacco Use    Smoking status: Former     Types: Cigarettes     Quit date: 12/12/2011     Years since quitting: 10.8    Smokeless tobacco: Never    Tobacco comments:     smoked for 45 years   Vaping Use    Vaping Use: Never used   Substance and Sexual Activity    Alcohol use: No     Alcohol/week: 0.0 oz    Drug use: No    Sexual activity: Not on file       Family Hx:  Denies      REVIEW OF SYSTEMS  See HPI for further details.  All other systems are negative except as above in HPI.    PHYSICAL EXAM  VITAL SIGNS: /71   Pulse 100   Temp 36.2 °C (97.2 °F) (Temporal)   Resp (!) 22   Ht 1.6 m (5' 3\")   Wt 69.9 kg (154 lb)   LMP 07/03/2000 (Approximate)   SpO2 95%   BMI 27.28 kg/m²     General:  WDWN female, nontoxic appearing in NAD; A+Ox3; V/S as above; afebrile  Skin: warm and dry; good color; no rash  HEENT: NCAT; EOMs intact; PERRL; no scleral icterus; no tongue lacerations  Neck: FROM; no meningismus, no LAD  Cardiovascular: Regular heart rate and rhythm.  No murmurs, rubs, or gallops; pulses 2+ bilaterally radially and DP areas  Lungs: Clear to auscultation with good air movement bilaterally.  No wheezes, rhonchi, or rales.   Abdomen: BS present; soft; NTND; no rebound, guarding, or rigidity.  No organomegaly or pulsatile mass  Extremities: COOK x 4; no e/o trauma; no pedal edema; neg Rand's  Neurologic: CNs III-XII grossly intact; speech clear; distal sensation intact; strength 5/5 UE/LEs  Psychiatric: flat affect, normal mood    LABS  Results for orders placed or performed during the hospital encounter of " 10/26/22   CBC WITH DIFFERENTIAL   Result Value Ref Range    WBC 5.8 4.8 - 10.8 K/uL    RBC 4.26 4.20 - 5.40 M/uL    Hemoglobin 13.0 12.0 - 16.0 g/dL    Hematocrit 39.6 37.0 - 47.0 %    MCV 93.0 81.4 - 97.8 fL    MCH 30.5 27.0 - 33.0 pg    MCHC 32.8 (L) 33.6 - 35.0 g/dL    RDW 53.4 (H) 35.9 - 50.0 fL    Platelet Count 214 164 - 446 K/uL    MPV 11.0 9.0 - 12.9 fL    Neutrophils-Polys 74.20 (H) 44.00 - 72.00 %    Lymphocytes 15.50 (L) 22.00 - 41.00 %    Monocytes 6.20 0.00 - 13.40 %    Eosinophils 3.10 0.00 - 6.90 %    Basophils 0.50 0.00 - 1.80 %    Immature Granulocytes 0.50 0.00 - 0.90 %    Nucleated RBC 0.00 /100 WBC    Neutrophils (Absolute) 4.30 2.00 - 7.15 K/uL    Lymphs (Absolute) 0.90 (L) 1.00 - 4.80 K/uL    Monos (Absolute) 0.36 0.00 - 0.85 K/uL    Eos (Absolute) 0.18 0.00 - 0.51 K/uL    Baso (Absolute) 0.03 0.00 - 0.12 K/uL    Immature Granulocytes (abs) 0.03 0.00 - 0.11 K/uL    NRBC (Absolute) 0.00 K/uL   CMP   Result Value Ref Range    Sodium 134 (L) 135 - 145 mmol/L    Potassium 3.9 3.6 - 5.5 mmol/L    Chloride 98 96 - 112 mmol/L    Co2 25 20 - 33 mmol/L    Anion Gap 11.0 7.0 - 16.0    Glucose 124 (H) 65 - 99 mg/dL    Bun 12 8 - 22 mg/dL    Creatinine 0.78 0.50 - 1.40 mg/dL    Calcium 9.1 8.5 - 10.5 mg/dL    AST(SGOT) 30 12 - 45 U/L    ALT(SGPT) 17 2 - 50 U/L    Alkaline Phosphatase 91 30 - 99 U/L    Total Bilirubin 0.3 0.1 - 1.5 mg/dL    Albumin 3.8 3.2 - 4.9 g/dL    Total Protein 6.2 6.0 - 8.2 g/dL    Globulin 2.4 1.9 - 3.5 g/dL    A-G Ratio 1.6 g/dL   LACTIC ACID   Result Value Ref Range    Lactic Acid 2.3 (H) 0.5 - 2.0 mmol/L   ESTIMATED GFR   Result Value Ref Range    GFR (CKD-EPI) 84 >60 mL/min/1.73 m 2   Blood Culture,Hold   Result Value Ref Range    Blood Culture Hold Collected    EKG   Result Value Ref Range    Report       [facility] Emergency Dept.    Test Date:  2022-10-26  Pt Name:    ANDRES BANUELOS               Department: ER  MRN:        [id number]                       Room:       [facility]  Gender:     Female                       Technician: TCM  :        [id number]                   Requested By:NIKOLAY KENT  Order #:    [id number]                    Reading MD: NIKOLAY KENT MD    Measurements  Intervals                                Axis  Rate:       89                           P:          63  OK:         151                          QRS:        69  QRSD:       98                           T:          55  QT:         396  QTc:        482    Interpretive Statements  Sinus rhythm  Low voltage, precordial leads  Compared to ECG 2022 01:27:17  Low QRS voltage now present  Electronically Signed On 10- 14:05:55 PDT by NIKOLAY KENT MD             IMAGING  CT-HEAD W/O   Final Result      No acute intracranial abnormality.             MEDICAL RECORD  I have reviewed patient's medical record and pertinent results are listed below.      COURSE & MEDICAL DECISION MAKING  I have reviewed any medical record information, laboratory studies and radiographic results as noted.    Gena Dykes is a 66 y.o. female who presents for evaluation of altered mental state    Appropriate PPE was worn at all times while interacting with the patient.      Lactic acid noted to be 2.3.  Lactic acid panel and sepsis fluids ordered.  I will hold off antibiotics as I do not see an obvious source of infection.  The lactic acidosis may be due to dehydration.    Orthostatics positive further supporting possible dehydration.    Pt was re-evaluated at 5:08 PM  Patient is asleep.  O2 sat 90% while asleep on her side.    I discussed the case with Dr. Vidal.  He requests a neurology consult for possible seizure-like activity.  He feels that a syncope work-up was completed with an echo several weeks ago and that the patient would not be needing admission for this.  He inquired    I discussed the case with Dr. Henry from neurology who agrees to consult.    Dr. Vidal called me back  stating he had seen the patient and that the patient did not want to be admitted due to cost.  For me, the patient lives in a group home and has schizophrenia.  I am unsure if the patient has the capacity to decline admission.  He stated that the patient told him that she had not had a seizure.  I do not feel that the patient could admit or deny this if she was altered.  Dr. Vidal stated he would write a note saying that the patient may be discharged and that he is signing off as his shift is over.  If the neurology consult recommends admission for further work-up, I should call the triage officer.    I reviewed Dr. Vidal's Hospital medicine consult note which states that the patient was sent from the group home for evaluation after a fall.  I do not feel this accurately reflects why the patient came to the ER or addresses my concerns.    Neurology has written a consult note.  They recommend observation.  I will call the triage officer.    I discussed the case with the PA working with the hospitalist.  He will relay the information to the triage officer/hospitalist.    IMPRESSION  1. Altered level of consciousness        2. Confusion        3. Elevated lactic acid level            Electronically signed by: Dianne Flores M.D., 10/26/2022 12:59 PM

## 2022-10-26 NOTE — ED NOTES
Med rec partially complete with list of meds from [facility], unknown last doses. Waiting on MAR.

## 2022-10-26 NOTE — ED NOTES
Med Rec complete per patient's caregiver  No oral antibiotics in the last 30 days   Allergies reviewed

## 2022-10-27 ENCOUNTER — APPOINTMENT (OUTPATIENT)
Dept: RADIOLOGY | Facility: MEDICAL CENTER | Age: 67
End: 2022-10-27
Attending: STUDENT IN AN ORGANIZED HEALTH CARE EDUCATION/TRAINING PROGRAM
Payer: MEDICARE

## 2022-10-27 VITALS
BODY MASS INDEX: 27.38 KG/M2 | DIASTOLIC BLOOD PRESSURE: 70 MMHG | HEIGHT: 63 IN | OXYGEN SATURATION: 92 % | WEIGHT: 154.54 LBS | SYSTOLIC BLOOD PRESSURE: 130 MMHG | RESPIRATION RATE: 16 BRPM | HEART RATE: 84 BPM | TEMPERATURE: 98 F
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
## 2022-10-27 PROBLEM — E86.0 DEHYDRATION: Status: RESOLVED | Noted: 2022-10-26 | Resolved: 2022-10-27

## 2022-10-27 PROBLEM — R56.9 SEIZURE-LIKE ACTIVITY (HCC): Status: RESOLVED | Noted: 2022-10-26 | Resolved: 2022-10-27

## 2022-10-27 PROBLEM — R55 NEAR SYNCOPE: Status: RESOLVED | Noted: 2022-10-26 | Resolved: 2022-10-27

## 2022-10-27 LAB
ANION GAP SERPL CALC-SCNC: 8 MMOL/L (ref 7–16)
BASOPHILS # BLD AUTO: 0.4 % (ref 0–1.8)
BASOPHILS # BLD: 0.03 K/UL (ref 0–0.12)
BUN SERPL-MCNC: 10 MG/DL (ref 8–22)
CALCIUM SERPL-MCNC: 8.9 MG/DL (ref 8.5–10.5)
CHLORIDE SERPL-SCNC: 104 MMOL/L (ref 96–112)
CO2 SERPL-SCNC: 26 MMOL/L (ref 20–33)
CREAT SERPL-MCNC: 0.71 MG/DL (ref 0.5–1.4)
EOSINOPHIL # BLD AUTO: 0.17 K/UL (ref 0–0.51)
EOSINOPHIL NFR BLD: 2.3 % (ref 0–6.9)
ERYTHROCYTE [DISTWIDTH] IN BLOOD BY AUTOMATED COUNT: 51.9 FL (ref 35.9–50)
GFR SERPLBLD CREATININE-BSD FMLA CKD-EPI: 94 ML/MIN/1.73 M 2
GLUCOSE SERPL-MCNC: 101 MG/DL (ref 65–99)
HCT VFR BLD AUTO: 41.8 % (ref 37–47)
HGB BLD-MCNC: 13.9 G/DL (ref 12–16)
IMM GRANULOCYTES # BLD AUTO: 0.04 K/UL (ref 0–0.11)
IMM GRANULOCYTES NFR BLD AUTO: 0.5 % (ref 0–0.9)
LYMPHOCYTES # BLD AUTO: 0.99 K/UL (ref 1–4.8)
LYMPHOCYTES NFR BLD: 13.3 % (ref 22–41)
MCH RBC QN AUTO: 30.3 PG (ref 27–33)
MCHC RBC AUTO-ENTMCNC: 33.3 G/DL (ref 33.6–35)
MCV RBC AUTO: 91.3 FL (ref 81.4–97.8)
MONOCYTES # BLD AUTO: 0.55 K/UL (ref 0–0.85)
MONOCYTES NFR BLD AUTO: 7.4 % (ref 0–13.4)
NEUTROPHILS # BLD AUTO: 5.68 K/UL (ref 2–7.15)
NEUTROPHILS NFR BLD: 76.1 % (ref 44–72)
NRBC # BLD AUTO: 0 K/UL
NRBC BLD-RTO: 0 /100 WBC
PLATELET # BLD AUTO: 209 K/UL (ref 164–446)
PMV BLD AUTO: 10.4 FL (ref 9–12.9)
POTASSIUM SERPL-SCNC: 4 MMOL/L (ref 3.6–5.5)
RBC # BLD AUTO: 4.58 M/UL (ref 4.2–5.4)
SODIUM SERPL-SCNC: 138 MMOL/L (ref 135–145)
WBC # BLD AUTO: 7.5 K/UL (ref 4.8–10.8)

## 2022-10-27 PROCEDURE — 85025 COMPLETE CBC W/AUTO DIFF WBC: CPT

## 2022-10-27 PROCEDURE — 95816 EEG AWAKE AND DROWSY: CPT | Mod: 26 | Performed by: STUDENT IN AN ORGANIZED HEALTH CARE EDUCATION/TRAINING PROGRAM

## 2022-10-27 PROCEDURE — G0378 HOSPITAL OBSERVATION PER HR: HCPCS

## 2022-10-27 PROCEDURE — 700102 HCHG RX REV CODE 250 W/ 637 OVERRIDE(OP): Performed by: STUDENT IN AN ORGANIZED HEALTH CARE EDUCATION/TRAINING PROGRAM

## 2022-10-27 PROCEDURE — 99217 PR OBSERVATION CARE DISCHARGE: CPT | Performed by: NURSE PRACTITIONER

## 2022-10-27 PROCEDURE — 99285 EMERGENCY DEPT VISIT HI MDM: CPT

## 2022-10-27 PROCEDURE — 80048 BASIC METABOLIC PNL TOTAL CA: CPT

## 2022-10-27 PROCEDURE — 36415 COLL VENOUS BLD VENIPUNCTURE: CPT

## 2022-10-27 PROCEDURE — 95816 EEG AWAKE AND DROWSY: CPT | Performed by: STUDENT IN AN ORGANIZED HEALTH CARE EDUCATION/TRAINING PROGRAM

## 2022-10-27 PROCEDURE — A9576 INJ PROHANCE MULTIPACK: HCPCS | Performed by: STUDENT IN AN ORGANIZED HEALTH CARE EDUCATION/TRAINING PROGRAM

## 2022-10-27 PROCEDURE — 96372 THER/PROPH/DIAG INJ SC/IM: CPT | Mod: XU

## 2022-10-27 PROCEDURE — 70553 MRI BRAIN STEM W/O & W/DYE: CPT

## 2022-10-27 PROCEDURE — 700117 HCHG RX CONTRAST REV CODE 255: Performed by: STUDENT IN AN ORGANIZED HEALTH CARE EDUCATION/TRAINING PROGRAM

## 2022-10-27 PROCEDURE — A9270 NON-COVERED ITEM OR SERVICE: HCPCS | Performed by: STUDENT IN AN ORGANIZED HEALTH CARE EDUCATION/TRAINING PROGRAM

## 2022-10-27 RX ADMIN — VERAPAMIL HYDROCHLORIDE 120 MG: 240 TABLET, FILM COATED, EXTENDED RELEASE ORAL at 08:33

## 2022-10-27 RX ADMIN — SENNOSIDES AND DOCUSATE SODIUM 2 TABLET: 50; 8.6 TABLET ORAL at 05:23

## 2022-10-27 RX ADMIN — CLOZAPINE 200 MG: 100 TABLET ORAL at 05:22

## 2022-10-27 RX ADMIN — ISOSORBIDE MONONITRATE 30 MG: 30 TABLET, EXTENDED RELEASE ORAL at 05:22

## 2022-10-27 RX ADMIN — GADOTERIDOL 15 ML: 279.3 INJECTION, SOLUTION INTRAVENOUS at 12:45

## 2022-10-27 RX ADMIN — ASPIRIN 81 MG 81 MG: 81 TABLET ORAL at 05:22

## 2022-10-27 ASSESSMENT — PAIN DESCRIPTION - PAIN TYPE: TYPE: ACUTE PAIN

## 2022-10-27 NOTE — CARE PLAN
The patient is Stable - Low risk of patient condition declining or worsening    Shift Goals  Clinical Goals: Seizure monitoring, MRI  Patient Goals: Rest, comfort  Family Goals: NA    Progress made toward(s) clinical / shift goals:  Pt demonstrates understanding of current plan of care.     Patient is not progressing towards the following goals:

## 2022-10-27 NOTE — HOSPITAL COURSE
Ms. Gena Dykes is a 66 y.o. female with history of schizophrenia from retirement who presented 10/26/2022 with evaluation for ground-level fall.      Patient reported having unsteady gait, possibly had a mechanical fall.  She reported drinking only 1 L of folate daily for unclear reason, admits to some degree of dehydration.  Ultimately she was sent from retirement to ER for evaluation of fall.  Denies tongue bite, loss of bladder/bowel incontinence, complete loss of consciousness.  No acute etiology seen on CT head.  CBC unremarkable, CMP other than sodium of 134, fairly benign.  She was found to have mildly abnormal lactic acid of 2.3>2.2.  Medicine service was consulted for evaluation for admission for suspected seizure.    An EEG and MRI was obtained.  EEG results noted abnormal video EEG recording in the awake and drowsy state which is occasional to frequent in the left temporal/parietal spikes/polyspikes, suggestive of an increasing risk of seizure arising from this area.  No persistent focal asymmetry seen and no seizures.  Discussed results with neurology, Dr. Lynn of which he notes that interpretation is considered normal with no recommendations for medications at this time.  MRI of the brain also obtained which noted nonspecific T2 hyperintensities noted in the periventricular and deep white matter most likely related to chronic microvascular ischemia.    Patient seen and examined prior to being discharged.  Discussed with patient results from EEG and MRI.  Discussed with patient staying hydrated as her near syncope episode is likely due to dehydration.  Patient also encouraged to drink electrolyte water to keep electrolytes within normal limits.  Patient to resume all other home medications.  Patient has recommended to follow-up with PCP s/p hospitalization.  All questions and concerns answered prior to being discharged.  Patient discharged home.

## 2022-10-27 NOTE — PROCEDURES
INPATIENT ROUTINE VIDEO ELECTROENCEPHALOGRAM REPORT      REFERRING PROVIDER: Dr. Bloom    DOS: 10/27/2022     TOTAL RECORDING TIME: 0 hours and 26 minutes of total recording time    INDICATION:  Gena Dykes 66 y.o. female presenting with seizure(s)    CURRENT ANTI-SEIZURE MEDICATIONS:   No AEDs    TECHNIQUE: Routine VEEG was set up by a Neurodiagnostic technologist who performed education to the patient and staff. A minimum of 23 electrodes and 23 channel recording was setup and performed by Neurodiagnostic technologist, in accordance with the international 10-20 system. The study was reviewed in bipolar and referential montages. The recording examined the patient in the  awake and drowsy state(s).     DESCRIPTION OF THE RECORD:  During maximal wakefulness, the background was continuous, symmetrical, and showed a 9-10 Hz posterior dominant rhythm , with a background mixed of alpha, theta, and delta activity.  Reactivity  was seen. State changes were seen.  During drowsiness, a loss of myogenic artifact  and theta/delta frequencies were seen.   EEG Sleep: N2 sleep architecture was not seen.      ACTIVATION PROCEDURES:   Intermittent Photic stimulation was performed in a stepwise fashion from 1 to 30 Hz and did not elicited any abnormalities on EEG.     ICTAL AND INTERICTAL FINDINGS:   Occasional to frequent left temporal/parietal spikes/polyspikes.    No regional slowing was seen during this routine study.      No definite electrographic or electroclinical seizures.     EKG: Sampling of the EKG recording did not demonstrate any abnormalities      EVENTS:  None    INTERPRETATION:   Abnormal video EEG recording in the awake and drowsy state(s):  - Occasional to frequent left temporal/parietal spikes/polyspikes, suggestive of an increased risk for seizures arising from this area. Clinical and radiographic correlation recommended.  - No persistent focal asymmetries seen.  - No seizures. Clinical correlation is  recommended.      Note:  If the clinical suspicion remains high for seizures, a prolonged recording to capture clinical or subclinical events may be helpful.        Nacho Monsalve MD  Department of Neurology at [facility]  General Neurologist and Epileptologist  Director of [facility] Level III Comprehensive Epilepsy Program  Professor of Clinical Neurology, [facility].   Phone: [phone]  Fax: [phone]  E-mail: [email or web address]

## 2022-10-27 NOTE — PROGRESS NOTES
Hospitalist cross coverage interim summary:    ER physician had contacted cross coverage MIKE Molina regarding neurology's review of chart and recommendations to make patient observation overnight for high suspicion of seizures and proceed with brain MRI with and without contrast and EEG monitoring.  Observation admission order subsequently placed.  Admission order set subsequently placed.  Please refer to Dr. Vidal's consult note for further details.     Electronically signed:  Dung Bloom DO

## 2022-10-27 NOTE — DISCHARGE SUMMARY
Discharge Summary    CHIEF COMPLAINT ON ADMISSION  Chief Complaint   Patient presents with    Altered Mental Status     From group home, another resident witnessed pt going down to her knees from a chair, pt was then altered afterwards GCS 11, pt arrives as GCS 13, no injury. , moves all extremities equally, sensation intact, no droop noted. Hx schizophrenia, HTN, MI. Pt denies any pain. Oriented only to first name.        Reason for Admission  ems     Admission Date  10/26/2022    CODE STATUS  Full Code    HPI & HOSPITAL COURSE    Ms. Gena Dykes is a 66 y.o. female with history of schizophrenia from [facility] who presented 10/26/2022 with evaluation for ground-level fall.      Patient reported having unsteady gait, possibly had a mechanical fall.  She reported drinking only 1 L of folate daily for unclear reason, admits to some degree of dehydration.  Ultimately she was sent from [facility] to ER for evaluation of fall.  Denies tongue bite, loss of bladder/bowel incontinence, complete loss of consciousness.  No acute etiology seen on CT head.  CBC unremarkable, CMP other than sodium of 134, fairly benign.  She was found to have mildly abnormal lactic acid of 2.3>2.2.  Medicine service was consulted for evaluation for admission for suspected seizure.    An EEG and MRI was obtained.  EEG results noted abnormal video EEG recording in the awake and drowsy state which is occasional to frequent in the left temporal/parietal spikes/polyspikes, suggestive of an increasing risk of seizure arising from this area.  No persistent focal asymmetry seen and no seizures.  Discussed results with neurology, Dr. Lynn of which he notes that interpretation is considered normal with no recommendations for medications at this time.  MRI of the brain also obtained which noted nonspecific T2 hyperintensities noted in the periventricular and deep white matter most likely related to chronic microvascular ischemia.    Patient seen  and examined prior to being discharged.  Discussed with patient results from EEG and MRI.  Discussed with patient staying hydrated as her near syncope episode is likely due to dehydration.  Patient also encouraged to drink electrolyte water to keep electrolytes within normal limits.  Patient to resume all other home medications.  Patient has recommended to follow-up with PCP s/p hospitalization.  All questions and concerns answered prior to being discharged.  Patient discharged home.    Therefore, she is discharged in good and stable condition to home with close outpatient follow-up.    The patient recovered much more quickly than anticipated on admission.    Discharge Date  10/27/22      FOLLOW UP ITEMS POST DISCHARGE  Please call [phone] to schedule PCP appointment for patient.    Required specialty appointments include:       Discharge Instructions per JOANN RubinRWilliamNWilliam    -Follow-up with PCP s/p hospitalization  -Keep your self hydrated drinking approximately 1.0-1.5L of water; consider drinking electrolyte water to keep electrolyte levels within normal limits  -Resume all home medications    DIET: As tolerated    ACTIVITY: As tolerated    DIAGNOSIS: Near syncope    Return to ER if symptoms persist, chest pain, palpitations, shortness of breath, numbness, tingling, weakness, and high fevers.    DISCHARGE DIAGNOSES  Principal Problem (Resolved):    Seizure-like activity (HCC) POA: Unknown  Active Problems:    * No active hospital problems. *  Resolved Problems:    Dehydration POA: Unknown    Fall POA: Unknown    Near syncope POA: Unknown      FOLLOW UP  Future Appointments   Date Time Provider Department Center   12/13/2022 11:15 AM Ata Jacob M.D. [facility] None     Carito Zamora D.O.  [address] [facility] [address]  [phone]    Schedule an appointment as soon as possible for a visit in 1 week(s)        MEDICATIONS ON DISCHARGE     Medication List        CONTINUE taking these  medications        Instructions   Asenapine Maleate 10 MG Subl   Place 1 Tablet under the tongue 2 times a day.  Dose: 1 Tablet     aspirin 81 MG Chew chewable tablet  Commonly known as: ASA   Chew 1 Tablet every day.  Dose: 81 mg     atorvastatin 80 MG tablet  Commonly known as: LIPITOR   Take 1 Tablet by mouth every evening.  Dose: 80 mg     Calcium + Vitamin D3 600-5 MG-MCG Tabs  Generic drug: Calcium Carb-Cholecalciferol   TAKE 1 TABLET BY MOUTH ONCE DAILY     cloZAPine 100 MG Tabs  Commonly known as: CLOZARIL   Take 100-300 mg by mouth in the morning, at noon, and at bedtime. Take 200 mg in am, 100 mg in the afternoon, 300 mg at night  Dose: 100-300 mg     docusate calcium 240 MG Caps  Commonly known as: SURFAK   TAKE 1 CAPSULE BY MOUTH EVERY DAY.     fish oil 1000 MG Caps capsule   Take 1,000 mg by mouth 2 times a day.  Dose: 1,000 mg     isosorbide mononitrate SR 30 MG Tb24  Commonly known as: IMDUR   Take 1 Tablet by mouth every day.  Dose: 30 mg     Multivitamin Tabs   TAKE 1 TABLET BY MOUTH ONCE DAILY     polyethylene glycol/lytes 17 g Pack  Commonly known as: MIRALAX   Take 17 g by mouth every bedtime.  Dose: 17 g     therapeutic multivitamin-minerals Tabs   Take 1 Tab by mouth every day.  Dose: 1 Tablet     verapamil  MG CR tablet  Commonly known as: CALAN-SR   Take 1 Tablet by mouth every morning with breakfast.  Dose: 120 mg              Allergies  Allergies   Allergen Reactions    Azithromycin     Fluphenazine      hallucinations    Haldol [Haloperidol]     Erythromycin      Pt on Clozapine/no erythromycin    Haldol [Haloperidol Lactate]      Does not tolerate       DIET  Orders Placed This Encounter   Procedures    Diet Order Diet: Cardiac     Standing Status:   Standing     Number of Occurrences:   1     Order Specific Question:   Diet:     Answer:   Cardiac [6]       ACTIVITY  As tolerated.  Weight bearing as tolerated    CONSULTATIONS  Neurology      PROCEDURES  NONE    IMAGING    MR-BRAIN-WITH & W/O   Final Result         No acute intracranial process.      Nonspecific T2 hyperintensities are noted in the periventricular and deep white matter, most likely related to chronic microvascular ischemia.         CT-HEAD W/O   Final Result      No acute intracranial abnormality.               LABORATORY  Lab Results   Component Value Date    SODIUM 138 10/27/2022    POTASSIUM 4.0 10/27/2022    CHLORIDE 104 10/27/2022    CO2 26 10/27/2022    GLUCOSE 101 (H) 10/27/2022    BUN 10 10/27/2022    CREATININE 0.71 10/27/2022        Lab Results   Component Value Date    WBC 7.5 10/27/2022    HEMOGLOBIN 13.9 10/27/2022    HEMATOCRIT 41.8 10/27/2022    PLATELETCT 209 10/27/2022        Total time of the discharge process took 36 minutes.    ========================================================================================================================================================================  Please note that this dictation was created using voice recognition software. I have made every reasonable attempt to correct obvious errors, but there may be errors of grammar and possibly content that I did not discover before finalizing the note.    Electronically signed by:  Dr. MAKENZIE Galindo, DNP, APRN, FNP-C  Hospitalist Services  [facility]  [phone]  [email or web address]  10/27/22                  [phone]

## 2022-10-27 NOTE — CONSULTS
Referring Physician: Dr. Dianne Flores    Referral Reason:  Possible seizure    HPI:  Ms. Gena Dykes is a 66 y.o. left handed female with past medical history significant for schizophrenia, coronary artery disease, hypertension who is resident of [facility], was brought to emergency room for evaluation of fall with questionable seizure-like activity.  Apparently her GCS was 13, blood glucose was 139.  Patient tells me she has had black-colored spells before but never had seizure.  She tells me she is here because she has unsteady gait for years.  There was no report of bowel or bladder incontinence or loss of consciousness and there was no evidence of tongue biting.  Patient underwent a brain CT which not reveal acute abnormalities.    ROS:   Review of Systems   Constitutional:  Negative for chills, fever and malaise/fatigue.   HENT:  Negative for hearing loss and tinnitus.    Eyes:  Negative for blurred vision, double vision and photophobia.   Respiratory:  Negative for shortness of breath.    Cardiovascular:  Negative for chest pain.   Gastrointestinal:  Negative for nausea and vomiting.   Genitourinary:  Negative for hematuria.   Musculoskeletal:  Positive for falls. Negative for back pain, myalgias and neck pain.   Skin:  Negative for rash.   Neurological:  Positive for seizures. Negative for dizziness, tingling, tremors, sensory change, speech change, focal weakness, loss of consciousness, weakness and headaches.   Psychiatric/Behavioral:  Negative for memory loss.      Past Medical History: History reviewed. No pertinent past medical history.    Past Surgical History:   Past Surgical History:   Procedure Laterality Date    MASS EXCISION [facility]  7/18/2017    Procedure: ;  Surgeon: Brock Colin M.D.;  Location: SURGERY [facility];  Service:     LUMPECTOMY Left        Social History:   Social History     Socioeconomic History    Marital status: Single     Spouse name: Not on file    Number  of children: Not on file    Years of education: Not on file    Highest education level: Not on file   Occupational History    Not on file   Tobacco Use    Smoking status: Former     Types: Cigarettes     Quit date: 12/12/2011     Years since quitting: 10.8    Smokeless tobacco: Never    Tobacco comments:     smoked for 45 years   Vaping Use    Vaping Use: Never used   Substance and Sexual Activity    Alcohol use: No     Alcohol/week: 0.0 oz    Drug use: No    Sexual activity: Not on file   Other Topics Concern    Not on file   Social History Narrative    Not on file     Social Determinants of Health     Financial Resource Strain: Not on file   Food Insecurity: Not on file   Transportation Needs: Not on file   Physical Activity: Not on file   Stress: Not on file   Social Connections: Not on file   Intimate Partner Violence: Not on file   Housing Stability: Not on file       Family Hx:   Family History   Problem Relation Age of Onset    Heart Disease Mother        Current Medications:   No current facility-administered medications for this encounter.     Current Outpatient Medications   Medication Sig Dispense Refill    Omega-3 Fatty Acids (FISH OIL) 1000 MG Cap capsule Take 1,000 mg by mouth 2 times a day.      docusate calcium (SURFAK) 240 MG Cap TAKE 1 CAPSULE BY MOUTH EVERY DAY. 90 Capsule 2    atorvastatin (LIPITOR) 80 MG tablet Take 1 Tablet by mouth every evening. 90 Tablet 3    aspirin (ASA) 81 MG Chew Tab chewable tablet Chew 1 Tablet every day. 90 Tablet 3    isosorbide mononitrate SR (IMDUR) 30 MG TABLET SR 24 HR Take 1 Tablet by mouth every day. 90 Tablet 3    verapamil SR (CALAN-SR) 120 MG CR tablet Take 1 Tablet by mouth every morning with breakfast. 90 Tablet 3    CALCIUM + VITAMIN D3 600-5 MG-MCG Tab TAKE 1 TABLET BY MOUTH ONCE DAILY (Patient taking differently: Take 1 Tablet by mouth every day.) 90 Tablet 3    Multiple Vitamin (MULTIVITAMIN) Tab TAKE 1 TABLET BY MOUTH ONCE DAILY 30 Tablet 11     Asenapine Maleate 10 MG SL Tab Place 1 Tablet under the tongue 2 times a day.      polyethylene glycol/lytes (MIRALAX) Pack Take 17 g by mouth every bedtime.      clozapine (CLOZARIL) 100 MG TABS Take 100-300 mg by mouth 2 times a day. Take 200 mg in am, 100 mg in the afternoon, 300 mg at night  Indications: two tabs every morning and 4 tabs every bedtime      therapeutic multivitamin-minerals (THERAGRAN-M) TABS Take 1 Tab by mouth every day.         Allergies:   Allergies   Allergen Reactions    Azithromycin     Fluphenazine      hallucinations    Haldol [Haloperidol]     Erythromycin      Pt on Clozapine/no erythromycin    Haldol [Haloperidol Lactate]      Does not tolerate       Physical Exam:   Vitals:    10/26/22 1600 10/26/22 1630 10/26/22 1659 10/26/22 1729   BP: 139/65 (!) 155/83 (!) 142/70 (!) (P) 167/79   Pulse: 85 83 75 (P) 76   Resp:  15 13 (P) 19   Temp:       TempSrc:       SpO2: 94% 92% 90% (P) 97%   Weight:       Height:           Physical Exam   GENERAL:  Lying in the hospital bed in no apparent distress.  Head: Normocephalic and atraumatic.   Eyes: Pupils are equal, round, and reactive to light. EOM are normal.   Cardiovascular: Normal rate and regular rhythm.    Pulmonary/Chest: Breath sounds normal.   Abdominal: Soft. Bowel sounds are normal. He exhibits no distension. There is no tenderness.   Skin: Skin is warm and dry. No rash noted. No erythema.  Neuro Exam  MENTAL STATUS:  Awake, alert, oriented times 3.  Speech is fluent, comprehension is intact.  CRANIAL NERVES:  PERRL, EOMI with no nystagmus, face is symmetric, facial sensation is intact, tongue is in the midline, palate is symmetric. Hearing is intact to finger rub bilaterally. Shoulder shrugs are normal.  MOTOR:  Motor examination showed normal strength in direct testing of both upper and lower extremities, proximal and distal.    SENSATION:  Intact to light touch, temperature and proprioception throughout  REFLEXES:  2+ and symmetric,  toes are downgoing bilaterally  COORDINATION:  Normal finger to nose and heel to shin bilaterally  GAIT:  Deferred     Labs:  Recent Labs     10/26/22  1257   WBC 5.8   RBC 4.26   HEMOGLOBIN 13.0   HEMATOCRIT 39.6   MCV 93.0   MCH 30.5   MCHC 32.8*   RDW 53.4*   PLATELETCT 214   MPV 11.0     Recent Labs     10/26/22  1258   SODIUM 134*   POTASSIUM 3.9   CHLORIDE 98   CO2 25   GLUCOSE 124*   BUN 12   CREATININE 0.78   CALCIUM 9.1                     Recent Labs     10/26/22  1258   SODIUM 134*   POTASSIUM 3.9   CHLORIDE 98   CO2 25   GLUCOSE 124*   BUN 12     Recent Labs     10/26/22  1258   SODIUM 134*   POTASSIUM 3.9   CHLORIDE 98   CO2 25   BUN 12   CREATININE 0.78   CALCIUM 9.1         No results found for this or any previous visit.      Imaging reviewed:    CT-HEAD W/O   Final Result      No acute intracranial abnormality.                Assessment/Plan:  66 y.o. left handed female with past medical history significant for schizophrenia, coronary artery disease, hypertension who is resident of [facility], was brought to emergency room for evaluation of fall with questionable seizure-like activity, although there is no report of actual witnessed seizure and patient denies having any seizure as well.  According to triage note her GCS was 13 following her fall.  Her blood glucose was 139.  Patient tells me she has had black-colored spells before but never had seizure.  She tells me she is here because she has unsteady gait for years.  There was no report of bowel or bladder incontinence or loss of consciousness and there was no evidence of tongue biting.  Patient underwent a brain CT which not reveal acute abnormalities.  At this point would not recommend any antiepileptic medication.  Recommend observation and if high suspicion for seizure, proceed with brain MRI with and without contrast and EEG.  Neurology will follow as needed, please call us if there is any question.

## 2022-10-27 NOTE — CONSULTS
Hospital Medicine Consultation    Date of Service  10/26/2022    Referring Physician  Dianne Flores M.D.    Consulting Physician  Nacho Vidal M.D.    Reason for Consultation  Syncope, fall    History of Presenting Illness  66 y.o. female with history of schizophrenia from [facility] who presented 10/26/2022 with evaluation for ground-level fall.  Patient reported having unsteady gait, possibly had a mechanical fall.  She reported drinking only 1 L of folate daily for unclear reason, admits to some degree of dehydration.  Ultimately she was sent from [facility] to ER for evaluation of fall.  Denies tongue bite, loss of bladder/bowel incontinence, complete loss of consciousness.  No acute etiology seen on CT head.  CBC unremarkable, CMP other than sodium of 134, fairly benign.  She was found to have mildly abnormal lactic acid of 2.3>2.2.  Medicine service was consulted for evaluation for admission for suspected seizure.    Review of Systems  Review of Systems   Constitutional:  Negative for chills and fever.   HENT:  Negative for hearing loss and tinnitus.    Eyes:  Negative for blurred vision and double vision.   Respiratory:  Negative for cough and shortness of breath.    Cardiovascular:  Negative for chest pain and palpitations.   Gastrointestinal:  Negative for abdominal pain, heartburn, nausea and vomiting.   Genitourinary:  Negative for dysuria and flank pain.   Musculoskeletal:  Positive for falls. Negative for myalgias.   Skin:  Negative for itching and rash.   Neurological:  Positive for dizziness and weakness. Negative for tingling, tremors, sensory change, speech change, focal weakness, seizures, loss of consciousness and headaches.   Endo/Heme/Allergies:  Negative for environmental allergies. Does not bruise/bleed easily.   Psychiatric/Behavioral:  Negative for depression and substance abuse.    All other systems reviewed and are negative.    Past Medical History   has no past medical history on  file.    Surgical History   has a past surgical history that includes lumpectomy (Left) and mass excision general (7/18/2017).    Family History  family history includes Heart Disease in her mother.    Social History   reports that she quit smoking about 10 years ago. Her smoking use included cigarettes. She has never used smokeless tobacco. She reports that she does not drink alcohol and does not use drugs.    Medications  Prior to Admission Medications   Prescriptions Last Dose Informant Patient Reported? Taking?   Asenapine Maleate 10 MG SL Tab 10/26/2022 at AM Other Facility Yes No   Sig: Place 1 Tablet under the tongue 2 times a day.   CALCIUM + VITAMIN D3 600-5 MG-MCG Tab 10/26/2022 at AM Other Facility No No   Sig: TAKE 1 TABLET BY MOUTH ONCE DAILY   Patient taking differently: Take 1 Tablet by mouth every day.   Multiple Vitamin (MULTIVITAMIN) Tab 10/26/2022 at AM Other Facility No No   Sig: TAKE 1 TABLET BY MOUTH ONCE DAILY   Omega-3 Fatty Acids (FISH OIL) 1000 MG Cap capsule 10/26/2022 at AM Other Facility Yes Yes   Sig: Take 1,000 mg by mouth 2 times a day.   aspirin (ASA) 81 MG Chew Tab chewable tablet 10/26/2022 at AM Other Facility No No   Sig: Chew 1 Tablet every day.   atorvastatin (LIPITOR) 80 MG tablet 10/25/2022 at PM Other Facility No No   Sig: Take 1 Tablet by mouth every evening.   clozapine (CLOZARIL) 100 MG TABS 10/26/2022 at AM Other Facility Yes No   Sig: Take 100-300 mg by mouth 2 times a day. Take 200 mg in am, 100 mg in the afternoon, 300 mg at night  Indications: two tabs every morning and 4 tabs every bedtime   docusate calcium (SURFAK) 240 MG Cap 10/26/2022 at AM Other Facility No No   Sig: TAKE 1 CAPSULE BY MOUTH EVERY DAY.   isosorbide mononitrate SR (IMDUR) 30 MG TABLET SR 24 HR 10/26/2022 at AM Other Facility No No   Sig: Take 1 Tablet by mouth every day.   polyethylene glycol/lytes (MIRALAX) Pack 10/25/2022 at PM Other Facility Yes No   Sig: Take 17 g by mouth every bedtime.    therapeutic multivitamin-minerals (THERAGRAN-M) TABS 10/26/2022 at AM Other Facility Yes No   Sig: Take 1 Tab by mouth every day.   verapamil SR (CALAN-SR) 120 MG CR tablet 10/26/2022 at AM Other Facility No No   Sig: Take 1 Tablet by mouth every morning with breakfast.      Facility-Administered Medications: None       Allergies  Allergies   Allergen Reactions    Azithromycin     Fluphenazine      hallucinations    Haldol [Haloperidol]     Erythromycin      Pt on Clozapine/no erythromycin    Haldol [Haloperidol Lactate]      Does not tolerate       Physical Exam  Temp:  [36.2 °C (97.2 °F)] 36.2 °C (97.2 °F)  Pulse:  [] 75  Resp:  [13-22] 13  BP: (136-165)/(65-88) 142/70  SpO2:  [89 %-95 %] 90 %    Physical Exam  Vitals and nursing note reviewed.   Constitutional:       General: She is not in acute distress.  HENT:      Head: Normocephalic and atraumatic.      Nose: Nose normal.      Mouth/Throat:      Mouth: Mucous membranes are dry.      Pharynx: Oropharynx is clear.      Comments: No tongue laceration  Eyes:      General: No scleral icterus.     Extraocular Movements: Extraocular movements intact.   Cardiovascular:      Rate and Rhythm: Normal rate and regular rhythm.      Pulses: Normal pulses.      Heart sounds:     No friction rub.   Pulmonary:      Effort: No respiratory distress.      Breath sounds: No stridor. No wheezing or rales.   Abdominal:      General: Bowel sounds are normal. There is no distension.      Palpations: Abdomen is soft.      Tenderness: There is no abdominal tenderness. There is no guarding or rebound.   Musculoskeletal:      Cervical back: Neck supple. No tenderness.   Skin:     General: Skin is warm and dry.      Capillary Refill: Capillary refill takes less than 2 seconds.      Comments: Poor skin turgor   Neurological:      General: No focal deficit present.      Mental Status: She is alert and oriented to person, place, and time.      Comments: No facial droop  No pronator  drift  No dysarthria  No appreciable focal neurological deficit   Psychiatric:         Mood and Affect: Mood normal.       Fluids      Laboratory  Recent Labs     10/26/22  1257   WBC 5.8   RBC 4.26   HEMOGLOBIN 13.0   HEMATOCRIT 39.6   MCV 93.0   MCH 30.5   MCHC 32.8*   RDW 53.4*   PLATELETCT 214   MPV 11.0     Recent Labs     10/26/22  1258   SODIUM 134*   POTASSIUM 3.9   CHLORIDE 98   CO2 25   GLUCOSE 124*   BUN 12   CREATININE 0.78   CALCIUM 9.1                     Imaging  CT-HEAD W/O   Final Result      No acute intracranial abnormality.             Assessment/Plan  * Seizure-like activity (HCC)  Assessment & Plan  Patient denies having seizure, no tongue bite, no loss of bladder/bowel incontinence  No acute etiology seen on CT head    Per ER provider, there was concern of seizure-like activity --although no mention of witnessed seizure event or focal jerkiness of extremities or drooping of saliva    I did offer hospital admission for patient, however patient stated she has $36,000 unpaid medical bills and feels that she does not have seizure, and did not wish to be hospitalized    Patient is AAO x4 at time of my evaluation, denies hallucination, SI/HI    Agree with patient the etiology of her fall is likely due to dehydration and generalized weakness    However, please call back medicine service if clinical status change or patient is agreeable for admission    Near syncope  Assessment & Plan  No acute etiology seen on CT head  TTE in 08/2022 was unremarkable with EF of 60%  Clinically, no further work-up indicated at this time    Dehydration  Assessment & Plan  Agree with IV hydration      Thank you for consulting hospitalist medicine service in coordinating care for this patient.  Please call with questions or reconsult medicine service if any further assistance is needed.

## 2022-10-27 NOTE — ASSESSMENT & PLAN NOTE
No acute etiology seen on CT head  TTE in 08/2022 was unremarkable with EF of 60%  Clinically, no further work-up indicated at this time

## 2022-10-27 NOTE — ED NOTES
Pt called to use restroom, found trying to get out of bed by herself. Pt assisted to commode, pt assisted back to [facility]. froy placed on pt. Pt adjusted in [facility] to comfort.

## 2022-10-27 NOTE — CARE PLAN
The patient is Stable - Low risk of patient condition declining or worsening    Shift Goals  Clinical Goals: seizure monitoring, MRI  Patient Goals: rest, D/C  Family Goals: LOUIE    Progress made toward(s) clinical / shift goals:    Problem: Knowledge Deficit - Standard  Goal: Patient and family/care givers will demonstrate understanding of plan of care, disease process/condition, diagnostic tests and medications  Outcome: Progressing       Patient is not progressing towards the following goals:

## 2022-10-27 NOTE — ED NOTES
Patient resting on stretcher in no acute distress. Equal chest rise noted. VS stable on monitor. Bed locked and in low position. Call bell within reach

## 2022-10-27 NOTE — ED NOTES
Report from RAMEZ West and Birgit nurse apprentice. Pt resting in gurney, speaks in complete sentences. Repositions self. NAD noted.

## 2022-10-27 NOTE — ED NOTES
Pt transferring to T213/00 via  w/ transport, pt A&Ox3 - disoriented to situation, on 2L O2 nc at sleep, pt is SBA - x1 assist. Belongings w/i possession.

## 2022-10-27 NOTE — ASSESSMENT & PLAN NOTE
Patient denies having seizure, no tongue bite, no loss of bladder/bowel incontinence  No acute etiology seen on CT head    Per ER provider, there was concern of seizure-like activity --although no mention of witnessed seizure event or focal jerkiness of extremities or drooping of saliva    I did offer hospital admission for patient, however patient stated she has $36,000 unpaid medical bills and feels that she does not have seizure, and did not wish to be hospitalized    Patient is AAO x4 at time of my evaluation, denies hallucination, SI/HI    Agree with patient the etiology of her fall is likely due to dehydration and generalized weakness    However, please call back medicine service if clinical status change or patient is agreeable for admission

## 2022-10-27 NOTE — DISCHARGE INSTRUCTIONS
FOLLOW UP ITEMS POST DISCHARGE  Please call [phone] to schedule PCP appointment for patient.    Required specialty appointments include:       Discharge Instructions per Curtis Galindo, GABE.P.RWilliamN.    -Follow-up with PCP s/p hospitalization  -Keep your self hydrated drinking approximately 1.0-1.5L of water; consider drinking electrolyte water to keep electrolyte levels within normal limits  -Resume all home medications    DIET: As tolerated    ACTIVITY: As tolerated    DIAGNOSIS: Near syncope    Return to ER if symptoms persist, chest pain, palpitations, shortness of breath, numbness, tingling, weakness, and high fevers.

## 2022-12-10 LAB
BUN SERPL-MCNC: 16 MG/DL (ref 8–27)
BUN/CREAT SERPL: 19 (ref 12–28)
CALCIUM SERPL-MCNC: 9.1 MG/DL (ref 8.7–10.3)
CHLORIDE SERPL-SCNC: 102 MMOL/L (ref 96–106)
CHOLEST SERPL-MCNC: 138 MG/DL (ref 100–199)
CO2 SERPL-SCNC: 22 MMOL/L (ref 20–29)
CREAT SERPL-MCNC: 0.84 MG/DL (ref 0.57–1)
EGFRCR SERPLBLD CKD-EPI 2021: 77 ML/MIN/1.73
GLUCOSE SERPL-MCNC: 100 MG/DL (ref 70–99)
HDLC SERPL-MCNC: 65 MG/DL
LABORATORY COMMENT REPORT: NORMAL
LDLC SERPL CALC-MCNC: 59 MG/DL (ref 0–99)
POTASSIUM SERPL-SCNC: 3.9 MMOL/L (ref 3.5–5.2)
SODIUM SERPL-SCNC: 140 MMOL/L (ref 134–144)
TRIGL SERPL-MCNC: 66 MG/DL (ref 0–149)
VLDLC SERPL CALC-MCNC: 14 MG/DL (ref 5–40)

## 2022-12-13 ENCOUNTER — TELEPHONE (OUTPATIENT)
Dept: CARDIOLOGY | Facility: MEDICAL CENTER | Age: 67
End: 2022-12-13

## 2022-12-13 ENCOUNTER — OFFICE VISIT (OUTPATIENT)
Dept: CARDIOLOGY | Facility: MEDICAL CENTER | Age: 67
End: 2022-12-13
Payer: MEDICARE

## 2022-12-13 VITALS
HEIGHT: 63 IN | HEART RATE: 103 BPM | WEIGHT: 155 LBS | RESPIRATION RATE: 13 BRPM | DIASTOLIC BLOOD PRESSURE: 50 MMHG | SYSTOLIC BLOOD PRESSURE: 80 MMHG | OXYGEN SATURATION: 94 % | BODY MASS INDEX: 27.46 KG/M2

## 2022-12-13 DIAGNOSIS — I20.1 VASOSPASTIC ANGINA (HCC): ICD-10-CM

## 2022-12-13 DIAGNOSIS — E78.5 DYSLIPIDEMIA: ICD-10-CM

## 2022-12-13 DIAGNOSIS — I21.9 MYOCARDIAL INFARCTION WITH NONOBSTRUCTIVE CORONARY ARTERIES (HCC): ICD-10-CM

## 2022-12-13 PROBLEM — I25.2 OLD MYOCARDIAL INFARCTION: Status: RESOLVED | Noted: 2022-08-18 | Resolved: 2022-12-13

## 2022-12-13 PROBLEM — I25.10 CORONARY ARTERY DISEASE INVOLVING NATIVE CORONARY ARTERY OF NATIVE HEART WITHOUT ANGINA PECTORIS: Status: RESOLVED | Noted: 2022-09-13 | Resolved: 2022-12-13

## 2022-12-13 PROCEDURE — 99214 OFFICE O/P EST MOD 30 MIN: CPT | Performed by: INTERNAL MEDICINE

## 2022-12-13 RX ORDER — ATORVASTATIN CALCIUM 40 MG/1
40 TABLET, FILM COATED ORAL EVERY EVENING
Qty: 100 TABLET | Refills: 3 | Status: SHIPPED | OUTPATIENT
Start: 2022-12-13 | End: 2024-01-10

## 2022-12-13 ASSESSMENT — FIBROSIS 4 INDEX: FIB4 SCORE: 2.26

## 2022-12-13 NOTE — PROGRESS NOTES
Cardiology Follow-up Consultation Note    Date of note:    12/13/2022    Primary Care Provider: Carito Zamora D.O.    Name:             Gena Dykes   YOB: 1955  MRN:               [id number]    Chief Complaint   Patient presents with    Coronary Artery Disease       HISTORY OF PRESENT ILLNESS  Ms. Gena Dykes is a 66 y.o. female who returns to see us for follow-up of TY.  Is here with her caretaker, Radha.    Pertinent history:  TY: Was brought to [facility] on 8/18/2022 with 1 day of chest pain/pressure associated with nausea and vomiting.  EKG showed inferior ST elevation with depression in lateral leads.  Underwent urgent angiogram which revealed nonobstructive CAD  Dyslipidemia  Paranoid schizophrenia    Lives in a group home, [facility] since 1980s.  Radha has been her caretaker since then.    Last clinic visit: 9/13/2022 with MIKE Rios.  Patient is new to me.    Interim History:  Since her last visit, no recurrence of chest pain or pressure.  Per Radha, patient is always tired and fatigued.  After minimal activities she goes back to bed.  This is new since her admission in August 2022.  Was initiated on medical therapy with Imdur, verapamil, aspirin and high intensity statin which were all new for her.    Per patient, she does not feel well but is unable to describe further.      History reviewed. No pertinent past medical history.      Past Surgical History:   Procedure Laterality Date    MASS EXCISION GENERAL  7/18/2017    Procedure: ;  Surgeon: Brock Colin M.D.;  Location: SURGERY [facility];  Service:     LUMPECTOMY Left          Current Outpatient Medications   Medication Sig Dispense Refill    verapamil SR (CALAN-SR) 120 MG CR tablet Take 0.5 Tablets by mouth every morning with breakfast. 50 Tablet 3    atorvastatin (LIPITOR) 40 MG Tab Take 1 Tablet by mouth every evening. 100 Tablet 3    Omega-3 Fatty Acids (FISH OIL) 1000 MG Cap capsule  Take 1,000 mg by mouth 2 times a day.      docusate calcium (SURFAK) 240 MG Cap TAKE 1 CAPSULE BY MOUTH EVERY DAY. 90 Capsule 2    aspirin (ASA) 81 MG Chew Tab chewable tablet Chew 1 Tablet every day. 90 Tablet 3    CALCIUM + VITAMIN D3 600-5 MG-MCG Tab TAKE 1 TABLET BY MOUTH ONCE DAILY (Patient taking differently: Take 1 Tablet by mouth every day.) 90 Tablet 3    Multiple Vitamin (MULTIVITAMIN) Tab TAKE 1 TABLET BY MOUTH ONCE DAILY 30 Tablet 11    Asenapine Maleate 10 MG SL Tab Place 1 Tablet under the tongue 2 times a day.      polyethylene glycol/lytes (MIRALAX) Pack Take 17 g by mouth every bedtime.      clozapine (CLOZARIL) 100 MG TABS Take 100-300 mg by mouth in the morning, at noon, and at bedtime. Take 200 mg in am, 100 mg in the afternoon, 300 mg at night      therapeutic multivitamin-minerals (THERAGRAN-M) TABS Take 1 Tab by mouth every day.       No current facility-administered medications for this visit.         Allergies   Allergen Reactions    Azithromycin     Fluphenazine      hallucinations    Haldol [Haloperidol]     Erythromycin      Pt on Clozapine/no erythromycin    Haldol [Haloperidol Lactate]      Does not tolerate         Family History   Problem Relation Age of Onset    Heart Disease Mother          Social History     Socioeconomic History    Marital status: Single     Spouse name: Not on file    Number of children: Not on file    Years of education: Not on file    Highest education level: Not on file   Occupational History    Not on file   Tobacco Use    Smoking status: Former     Types: Cigarettes     Quit date: 2011     Years since quittin.0    Smokeless tobacco: Never    Tobacco comments:     smoked for 45 years   Vaping Use    Vaping Use: Never used   Substance and Sexual Activity    Alcohol use: No     Alcohol/week: 0.0 oz    Drug use: No    Sexual activity: Not on file   Other Topics Concern    Not on file   Social History Narrative    Not on file     Social Determinants of  "Health     Financial Resource Strain: Not on file   Food Insecurity: Not on file   Transportation Needs: Not on file   Physical Activity: Not on file   Stress: Not on file   Social Connections: Not on file   Intimate Partner Violence: Not on file   Housing Stability: Not on file         Physical Exam:  Ambulatory Vitals  BP (!) 80/50 (BP Location: Left arm, Patient Position: Sitting, BP Cuff Size: Adult)   Pulse (!) 103   Resp 13   Ht 1.6 m (5' 3\")   Wt 70.3 kg (155 lb)   SpO2 94%    Oxygen Therapy:  Pulse Oximetry: 94 %  BP Readings from Last 4 Encounters:   12/13/22 (!) 80/50   10/27/22 130/70   09/15/22 (!) 96/68   09/13/22 100/60       Weight/BMI: Body mass index is 27.46 kg/m².  Wt Readings from Last 4 Encounters:   12/13/22 70.3 kg (155 lb)   10/26/22 70.1 kg (154 lb 8.7 oz)   09/15/22 70.2 kg (154 lb 11.2 oz)   09/13/22 70.8 kg (156 lb)       GEN: Well developed, well nourished and in no acute distress.  HEART: no significant JVD, regular rate and rhythm, normal S1 and S2, no murmurs, no third heart sounds, normal cardiac palpation  LUNG: clear to auscultation bilaterally, no wheezing, no crackles, normal respiratory effort on room air  ABDOMEN: soft, non-tender, non-distended, normal bowel sounds throughout  EXTREMITIES: no peripheral edema noted  VASCULAR: no significantly elevated jugular venous pressure, no carotid bruits noted, radial pulses 2+ and equal      Lab Data Review:  Lab Results   Component Value Date/Time    CHOLSTRLTOT 138 12/09/2022 06:55 AM    CHOLSTRLTOT 159 08/18/2022 11:45 PM    LDL 98 08/18/2022 11:45 PM    HDL 65 12/09/2022 06:55 AM    HDL 49 08/18/2022 11:45 PM    TRIGLYCERIDE 66 12/09/2022 06:55 AM    TRIGLYCERIDE 59 08/18/2022 11:45 PM       Lab Results   Component Value Date/Time    SODIUM 140 12/09/2022 06:55 AM    SODIUM 138 10/27/2022 04:58 AM    POTASSIUM 3.9 12/09/2022 06:55 AM    POTASSIUM 4.0 10/27/2022 04:58 AM    CHLORIDE 102 12/09/2022 06:55 AM    CHLORIDE 104 " 10/27/2022 04:58 AM    CO2 22 12/09/2022 06:55 AM    CO2 26 10/27/2022 04:58 AM    GLUCOSE 100 (H) 12/09/2022 06:55 AM    GLUCOSE 101 (H) 10/27/2022 04:58 AM    BUN 16 12/09/2022 06:55 AM    BUN 10 10/27/2022 04:58 AM    CREATININE 0.84 12/09/2022 06:55 AM    CREATININE 0.71 10/27/2022 04:58 AM    BUNCREATRAT 19 12/09/2022 06:55 AM     Lab Results   Component Value Date/Time    ALKPHOSPHAT 91 10/26/2022 12:58 PM    ASTSGOT 30 10/26/2022 12:58 PM    ALTSGPT 17 10/26/2022 12:58 PM    TBILIRUBIN 0.3 10/26/2022 12:58 PM      Lab Results   Component Value Date/Time    WBC 7.5 10/27/2022 04:58 AM     Lab Results   Component Value Date/Time    HBA1C 5.8 (H) 02/24/2022 07:46 AM    HBA1C 5.9 (H) 02/24/2022 12:52 AM       Cardiac Imaging and Procedures Review:    EKG dated 8/18/2022: My personal interpretation is sinus rhythm with inferior ST elevation    EKG dated 10/26/2022: My personal interpretation is sinus rhythm without inferior ST elevation    Echo dated 8/19/2022:   Normal LV size and function  No valvular abnormality  No wall motion abnormality    [facility] (8/18/2022):   Findings:  1.  Left main coronary artery:  Normal.  2.  Left anterior descending artery:  Luminal irregularities, diagonals are small.  3.  Left circumflex coronary artery:  Luminal irregularities. Gives two marginal branches, which have no significant disease   4.  Right coronary artery:  30-40% stenosis in the proximal right coronary artery disease. This is a right dominant system.  5.  Left ventricular end diastolic pressure:  29 mmHg.  No signficant gradient across the aortic valve.  6.  Left ventriculogram:  Ejection fraction of 60%.      Radiology test Review:  CXR: No acute cardiopulmonary abnormality noted.      Assessment & Plan     1. Myocardial infarction with nonobstructive coronary arteries (HCC)  verapamil SR (CALAN-SR) 120 MG CR tablet    atorvastatin (LIPITOR) 40 MG Tab      2. Vasospastic angina (HCC)        3. Dyslipidemia  atorvastatin  (LIPITOR) 40 MG Tab          Patient's blood pressure is significantly low in the clinic today with 80/50.  She denies any lightheadedness or dizziness but has been feeling fatigued, low energy and constant sleepiness.  We discussed discontinuing Imdur which may help with blood pressure.    Her presentation with ST elevation with nonobstructive CAD and resolution of ST elevation on repeat EKG is mostly consistent with vasospastic angina.  She is currently on verapamil 120 mg daily with low BP.  After discussion, decrease verapamil to 60 mg daily.    Advised Radha to check regular blood pressure at group home.  She will let us know if her blood pressure is consistently below 100.    Lipid panel reviewed, decrease Lipitor to 40 mg daily.      All of patient and Radha's excellent questions were answered to the best of my knowledge and to their satisfaction.  It was a pleasure seeing Ms. Gena Dykes in my clinic today. Return in about 2 months (around 2/13/2023). Patient is aware to call the cardiology clinic with any questions or concerns.      Ata Jacob MD  [facility]  [facility], [facility]  [address] [address]  Phone: [phone]  Fax: [phone]    Please note that this dictation was created using voice recognition software. I have made every reasonable attempt to correct obvious errors, but it is possible there are errors of grammar and possibly content that I did not discover before finalizing the note.

## 2022-12-13 NOTE — TELEPHONE ENCOUNTER
SOLOMON    Caller:   Shae  [facility]    Topic/issue: Needs clarification ondirections for;   verapamil SR (CALAN-SR) 120 MG CR tablet [[id number]]    Callback Number:  - [phone]    Thank you,   Manju LOVETT

## 2022-12-14 ENCOUNTER — OFFICE VISIT (OUTPATIENT)
Dept: MEDICAL GROUP | Facility: CLINIC | Age: 67
End: 2022-12-14
Payer: MEDICARE

## 2022-12-14 VITALS
BODY MASS INDEX: 27.85 KG/M2 | RESPIRATION RATE: 14 BRPM | TEMPERATURE: 97.7 F | WEIGHT: 157.2 LBS | DIASTOLIC BLOOD PRESSURE: 70 MMHG | HEART RATE: 78 BPM | OXYGEN SATURATION: 96 % | SYSTOLIC BLOOD PRESSURE: 122 MMHG | HEIGHT: 63 IN

## 2022-12-14 DIAGNOSIS — Z23 NEED FOR VACCINATION: ICD-10-CM

## 2022-12-14 DIAGNOSIS — F20.0 PARANOID SCHIZOPHRENIA (HCC): ICD-10-CM

## 2022-12-14 DIAGNOSIS — Z12.11 SCREENING FOR COLON CANCER: ICD-10-CM

## 2022-12-14 DIAGNOSIS — E78.00 HYPERCHOLESTEROLEMIA: ICD-10-CM

## 2022-12-14 DIAGNOSIS — I21.9 MYOCARDIAL INFARCTION WITH NONOBSTRUCTIVE CORONARY ARTERIES (HCC): ICD-10-CM

## 2022-12-14 DIAGNOSIS — I25.2 OLD MYOCARDIAL INFARCTION: ICD-10-CM

## 2022-12-14 DIAGNOSIS — K59.00 CONSTIPATION, UNSPECIFIED CONSTIPATION TYPE: Chronic | ICD-10-CM

## 2022-12-14 PROCEDURE — G0008 ADMIN INFLUENZA VIRUS VAC: HCPCS

## 2022-12-14 PROCEDURE — 99214 OFFICE O/P EST MOD 30 MIN: CPT | Mod: 25,GC

## 2022-12-14 PROCEDURE — 90686 IIV4 VACC NO PRSV 0.5 ML IM: CPT

## 2022-12-14 ASSESSMENT — FIBROSIS 4 INDEX: FIB4 SCORE: 2.26

## 2022-12-14 NOTE — PROGRESS NOTES
Subjective:     CC: Follow-up    HPI:   Gena with pmhx paranoid schizophrenia, hypercholesterolemia, MINOCA, and GERD who presents today for follow-up.  Patient was hospitalized in August 2022 for a MI with nonobstructive CAD.  She was started on Imdur and verapamil and has been following up with the cardiologist.  She patient saw her cardiologist yesterday and her verapamil was decreased to 60 mg from 120 mg due to a blood pressure of 80/50.  Patient's Lipitor was also decreased to 40 mg daily given patient's improved lipid panel.  Her Imdur was also discontinued.  Patient states that she has been experiencing fatigue, low energy, and constant sleepiness since 1970.  She states that she lays in her bed all during the day and that her caregiver has been telling her she needs to get up and move around more.  Patient states that she feels better today after the changes were made to her medications.  Caretaker states that patient has an appointment with her psychiatrist at the end of this month.  She reports that her constipation has been improving with prune juice and docusate.  Patient denies any chest pain, shortness of breath, abdominal pain, nausea, or vomiting.    Problem   Myocardial Infarction With Nonobstructive Coronary Arteries (Hcc)   Constipation   Screening for Colon Cancer   Paranoid Schizophrenia (Hcc)   Hypercholesterolemia       Current Outpatient Medications Ordered in Epic   Medication Sig Dispense Refill    verapamil SR (CALAN-SR) 120 MG CR tablet Take 0.5 Tablets by mouth every morning with breakfast. 50 Tablet 3    atorvastatin (LIPITOR) 40 MG Tab Take 1 Tablet by mouth every evening. 100 Tablet 3    Omega-3 Fatty Acids (FISH OIL) 1000 MG Cap capsule Take 1,000 mg by mouth 2 times a day.      docusate calcium (SURFAK) 240 MG Cap TAKE 1 CAPSULE BY MOUTH EVERY DAY. 90 Capsule 2    aspirin (ASA) 81 MG Chew Tab chewable tablet Chew 1 Tablet every day. 90 Tablet 3    CALCIUM + VITAMIN D3 600-5 MG-MCG  "Tab TAKE 1 TABLET BY MOUTH ONCE DAILY (Patient taking differently: Take 1 Tablet by mouth every day.) 90 Tablet 3    Multiple Vitamin (MULTIVITAMIN) Tab TAKE 1 TABLET BY MOUTH ONCE DAILY 30 Tablet 11    Asenapine Maleate 10 MG SL Tab Place 1 Tablet under the tongue 2 times a day.      polyethylene glycol/lytes (MIRALAX) Pack Take 17 g by mouth every bedtime.      clozapine (CLOZARIL) 100 MG TABS Take 100-300 mg by mouth in the morning, at noon, and at bedtime. Take 200 mg in am, 100 mg in the afternoon, 300 mg at night      therapeutic multivitamin-minerals (THERAGRAN-M) TABS Take 1 Tab by mouth every day.       No current [facility]-ordered facility-administered medications on file.       ROS:  ROS as per HPI. Otherwise negative.    Preventive screenings:     Cancer screenings:   Colon Cancer (50-75 every 10 years or 45-59 years of age): Patient due for a Cologuard and has the kit in her room but has not completed it yet.  Breast Cancer: (50-74 biennial mammography) mammogram completed this year which was normal.  Cervical Cancer: (21-65 every 3 years) patient had a Pap smear done 3 years ago which was normal  (21-29 cytology q3, 30-65 cytology q3 or HPV testing alone q5 or HPV + Cytology cotesting q5)    Objective:     Exam:  /70 (BP Location: Right arm, Patient Position: Sitting, BP Cuff Size: Adult)   Pulse 78   Temp 36.5 °C (97.7 °F) (Temporal)   Resp 14   Ht 1.6 m (5' 3\")   Wt 71.3 kg (157 lb 3.2 oz)   LMP 07/03/2000 (Approximate)   SpO2 96%   BMI 27.85 kg/m²  Body mass index is 27.85 kg/m².    Gen: Alert and oriented, No apparent distress.  Neck: Neck is supple without lymphadenopathy.  Lungs: Normal effort, CTA bilaterally, no wheezes, rhonchi, or rales  CV: Regular rate and rhythm. No murmurs, rubs, or gallops.  Ext: No clubbing, cyanosis, edema.    Assessment & Plan:     66 y.o. female with the following -     Problem List Items Addressed This Visit       Constipation (Chronic)     Improving with " prune juice and docusate.  Recommend continuing medication and diet modifications.         Paranoid schizophrenia (HCC)     Patient currently on closet pain and has an appointment with her psychiatrist at the end of the month.         Hypercholesterolemia     Cholesterol 138 on 12/9/2022.  Lipitor decreased to 40 mg daily yesterday by cardiology.         Screening for colon cancer     Cologuard was delivered to patient however, she has not yet sent in a stool sample         Myocardial infarction with nonobstructive coronary arteries (HCC)     Patient seen by cardiologist yesterday and her Imdur was discontinued and verapamil decreased to 60 mg daily due to hypotension.  Lipitor was also decreased to 40 mg daily given improvement in cholesterol levels.  She is scheduled to follow-up with a cardiologist in February.          Other Visit Diagnoses       Need for vaccination        Old myocardial infarction                I spent a total of 30 minutes with record review, exam, communication with the patient, communication with other providers, and documentation of this encounter.      Return in about 6 months (around 6/14/2023).

## 2022-12-14 NOTE — TELEPHONE ENCOUNTER
Discussed with pharmacy,   No extended release available in 60mg unable to break the Verapmil in half d/t not being scored.   40 or 80mg of immediate release only available.     To radha to advise on rx

## 2022-12-15 NOTE — ASSESSMENT & PLAN NOTE
Patient seen by cardiologist yesterday and her Imdur was discontinued and verapamil decreased to 60 mg daily due to hypotension.  Lipitor was also decreased to 40 mg daily given improvement in cholesterol levels.  She is scheduled to follow-up with a cardiologist in February.

## 2022-12-15 NOTE — ASSESSMENT & PLAN NOTE
Patient currently on closet pain and has an appointment with her psychiatrist at the end of the month.

## 2022-12-16 RX ORDER — VERAPAMIL HYDROCHLORIDE 40 MG/1
40 TABLET ORAL 2 TIMES DAILY
Qty: 180 TABLET | Refills: 3 | Status: SHIPPED | OUTPATIENT
Start: 2022-12-16 | End: 2023-12-13

## 2022-12-16 NOTE — TELEPHONE ENCOUNTER
Radha stopped by stating pharmacy was not notified of splitting medication, verapamil, in two 40 mg. Pharmacy will need to be contacted to be informed of changes @ [phone].

## 2022-12-16 NOTE — TELEPHONE ENCOUNTER
Called Radha [phone]  Relayed change in medication to Verapamil 40mg BID per DA. Pharmacy verified. Radha verbalized understanding.         verapamil (ISOPTIN) 40 MG Tab 180 Tablet 3/3 12/16/2022     Sig - Route: Take 1 Tablet by mouth 2 times a day. - Oral    Sent to pharmacy as: Verapamil HCl 40 MG Oral Tablet (ISOPTIN)    Cosign for Ordering: Required by Ata Jacob M.D.    E-Prescribing Status: Receipt confirmed by pharmacy (12/16/2022 10:12 AM PST)      Pharmacy    [facility] - [facility] - [id number]. [address]

## 2023-04-20 RX ORDER — CALCIUM CARBONATE/VITAMIN D3 600MG-5MCG
TABLET ORAL
Qty: 30 TABLET | Refills: 10 | OUTPATIENT
Start: 2023-04-20

## 2023-04-21 NOTE — TELEPHONE ENCOUNTER
It appears patient is established with family medicine, I have not seen this patient since 12/2021. Prescription, if needed, will need to be refilled by her current providers.

## 2023-05-08 ENCOUNTER — OFFICE VISIT (OUTPATIENT)
Dept: CARDIOLOGY | Facility: MEDICAL CENTER | Age: 68
End: 2023-05-08
Payer: MEDICARE

## 2023-05-08 VITALS
WEIGHT: 146 LBS | DIASTOLIC BLOOD PRESSURE: 56 MMHG | HEART RATE: 94 BPM | BODY MASS INDEX: 25.87 KG/M2 | SYSTOLIC BLOOD PRESSURE: 116 MMHG | RESPIRATION RATE: 18 BRPM | HEIGHT: 63 IN | OXYGEN SATURATION: 94 %

## 2023-05-08 DIAGNOSIS — E78.5 DYSLIPIDEMIA: ICD-10-CM

## 2023-05-08 DIAGNOSIS — I21.9 MYOCARDIAL INFARCTION WITH NONOBSTRUCTIVE CORONARY ARTERIES (HCC): ICD-10-CM

## 2023-05-08 DIAGNOSIS — I10 ESSENTIAL HYPERTENSION: ICD-10-CM

## 2023-05-08 DIAGNOSIS — I20.1 VASOSPASTIC ANGINA (HCC): ICD-10-CM

## 2023-05-08 PROCEDURE — 99214 OFFICE O/P EST MOD 30 MIN: CPT | Performed by: INTERNAL MEDICINE

## 2023-05-08 ASSESSMENT — FIBROSIS 4 INDEX: FIB4 SCORE: 2.3

## 2023-05-08 NOTE — PROGRESS NOTES
Cardiology Follow-up Consultation Note    Date of note:    5/8/2023    Primary Care Provider: Carito Zamora D.O.    Name:             Gena Dykes   YOB: 1955  MRN:               [id number]    Chief Complaint   Patient presents with    Follow-Up     F/v Dx:Myocardial infarction with nonobstructive coronary arteries (HCC)       HISTORY OF PRESENT ILLNESS  Ms. Gena Dykes is a 66 y.o. female who returns to see us for follow-up of MINOCA.  Is here with her caretaker, Radha.    Pertinent history:  TY: Was brought to [facility] on 8/18/2022 with 1 day of chest pain/pressure associated with nausea and vomiting.  EKG showed inferior ST elevation with depression in lateral leads.  Underwent urgent angiogram which revealed nonobstructive CAD  Dyslipidemia  Paranoid schizophrenia    Lives in a group home, [facility] since 1980s.  Radha has been her caretaker since then.    Last clinic visit: 12/13/2022    Interim History:  Since her last visit, has been doing well from a cardiovascular perspective.  During our previous visit, Radha mentioned that she was always tired and fatigued.  This has certainly improved with discontinuation of antihypertensive medications.    She is not limited by chest pain or pressure.  Denies lightheadedness or dizziness.      History reviewed. No pertinent past medical history.      Past Surgical History:   Procedure Laterality Date    MASS EXCISION GENERAL  7/18/2017    Procedure: ;  Surgeon: Brock Colin M.D.;  Location: SURGERY [facility];  Service:     LUMPECTOMY Left          Current Outpatient Medications   Medication Sig Dispense Refill    verapamil (ISOPTIN) 40 MG Tab Take 1 Tablet by mouth 2 times a day. 180 Tablet 3    atorvastatin (LIPITOR) 40 MG Tab Take 1 Tablet by mouth every evening. 100 Tablet 3    Omega-3 Fatty Acids (FISH OIL) 1000 MG Cap capsule Take 1,000 mg by mouth 2 times a day.      docusate calcium (SURFAK) 240 MG Cap TAKE 1  CAPSULE BY MOUTH EVERY DAY. 90 Capsule 2    CALCIUM + VITAMIN D3 600-5 MG-MCG Tab TAKE 1 TABLET BY MOUTH ONCE DAILY (Patient taking differently: Take 1 Tablet by mouth every day.) 90 Tablet 3    Multiple Vitamin (MULTIVITAMIN) Tab TAKE 1 TABLET BY MOUTH ONCE DAILY 30 Tablet 11    Asenapine Maleate 10 MG SL Tab Place 1 Tablet under the tongue 2 times a day.      polyethylene glycol/lytes (MIRALAX) Pack Take 17 g by mouth every bedtime.      clozapine (CLOZARIL) 100 MG TABS Take 100-300 mg by mouth in the morning, at noon, and at bedtime. Take 200 mg in am, 100 mg in the afternoon, 300 mg at night       No current facility-administered medications for this visit.         Allergies   Allergen Reactions    Azithromycin     Fluphenazine      hallucinations    Haldol [Haloperidol]     Erythromycin      Pt on Clozapine/no erythromycin    Haldol [Haloperidol Lactate]      Does not tolerate         Family History   Problem Relation Age of Onset    Heart Disease Mother          Social History     Socioeconomic History    Marital status: Single     Spouse name: Not on file    Number of children: Not on file    Years of education: Not on file    Highest education level: Not on file   Occupational History    Not on file   Tobacco Use    Smoking status: Former     Types: Cigarettes     Quit date: 2011     Years since quittin.4    Smokeless tobacco: Never    Tobacco comments:     smoked for 45 years   Vaping Use    Vaping Use: Never used   Substance and Sexual Activity    Alcohol use: No     Alcohol/week: 0.0 oz    Drug use: No    Sexual activity: Not on file   Other Topics Concern    Not on file   Social History Narrative    Not on file     Social Determinants of Health     Financial Resource Strain: Not on file   Food Insecurity: Not on file   Transportation Needs: Not on file   Physical Activity: Not on file   Stress: Not on file   Social Connections: Not on file   Intimate Partner Violence: Not on file   Housing  "Stability: Not on file         Physical Exam:  Ambulatory Vitals  /56 (BP Location: Left arm, Patient Position: Sitting, BP Cuff Size: Adult)   Pulse 94   Resp 18   Ht 1.6 m (5' 3\")   Wt 66.2 kg (146 lb)   SpO2 94%    Oxygen Therapy:  Pulse Oximetry: 94 %  BP Readings from Last 4 Encounters:   05/08/23 116/56   12/14/22 122/70   12/13/22 (!) 80/50   10/27/22 130/70       Weight/BMI: Body mass index is 25.86 kg/m².  Wt Readings from Last 4 Encounters:   05/08/23 66.2 kg (146 lb)   12/14/22 71.3 kg (157 lb 3.2 oz)   12/13/22 70.3 kg (155 lb)   10/26/22 70.1 kg (154 lb 8.7 oz)       GEN: Well developed, well nourished and in no acute distress.  HEART: no significant JVD, regular rate and rhythm, normal S1 and S2, no murmurs, no third heart sounds, normal cardiac palpation  LUNG: clear to auscultation bilaterally, no wheezing, no crackles, normal respiratory effort on room air  ABDOMEN: soft, non-tender, non-distended, normal bowel sounds throughout  EXTREMITIES: no peripheral edema noted  VASCULAR: no significantly elevated jugular venous pressure, no carotid bruits noted, radial pulses 2+ and equal      Lab Data Review:  Lab Results   Component Value Date/Time    CHOLSTRLTOT 138 12/09/2022 06:55 AM    CHOLSTRLTOT 159 08/18/2022 11:45 PM    LDL 98 08/18/2022 11:45 PM    HDL 65 12/09/2022 06:55 AM    HDL 49 08/18/2022 11:45 PM    TRIGLYCERIDE 66 12/09/2022 06:55 AM    TRIGLYCERIDE 59 08/18/2022 11:45 PM       Lab Results   Component Value Date/Time    SODIUM 140 12/09/2022 06:55 AM    SODIUM 138 10/27/2022 04:58 AM    POTASSIUM 3.9 12/09/2022 06:55 AM    POTASSIUM 4.0 10/27/2022 04:58 AM    CHLORIDE 102 12/09/2022 06:55 AM    CHLORIDE 104 10/27/2022 04:58 AM    CO2 22 12/09/2022 06:55 AM    CO2 26 10/27/2022 04:58 AM    GLUCOSE 100 (H) 12/09/2022 06:55 AM    GLUCOSE 101 (H) 10/27/2022 04:58 AM    BUN 16 12/09/2022 06:55 AM    BUN 10 10/27/2022 04:58 AM    CREATININE 0.84 12/09/2022 06:55 AM    CREATININE 0.71 " 10/27/2022 04:58 AM    BUNCREATRAT 19 12/09/2022 06:55 AM     Lab Results   Component Value Date/Time    ALKPHOSPHAT 91 10/26/2022 12:58 PM    ASTSGOT 30 10/26/2022 12:58 PM    ALTSGPT 17 10/26/2022 12:58 PM    TBILIRUBIN 0.3 10/26/2022 12:58 PM      Lab Results   Component Value Date/Time    WBC 7.5 10/27/2022 04:58 AM     Lab Results   Component Value Date/Time    HBA1C 5.8 (H) 02/24/2022 07:46 AM    HBA1C 5.9 (H) 02/24/2022 12:52 AM       Cardiac Imaging and Procedures Review:    EKG dated 8/18/2022: My personal interpretation is sinus rhythm with inferior ST elevation    EKG dated 10/26/2022: My personal interpretation is sinus rhythm without inferior ST elevation    Echo dated 8/19/2022:   Normal LV size and function  No valvular abnormality  No wall motion abnormality    LHC (8/18/2022):   Findings:  1.  Left main coronary artery:  Normal.  2.  Left anterior descending artery:  Luminal irregularities, diagonals are small.  3.  Left circumflex coronary artery:  Luminal irregularities. Gives two marginal branches, which have no significant disease   4.  Right coronary artery:  30-40% stenosis in the proximal right coronary artery disease. This is a right dominant system.  5.  Left ventricular end diastolic pressure:  29 mmHg.  No signficant gradient across the aortic valve.  6.  Left ventriculogram:  Ejection fraction of 60%.      Radiology test Review:  CXR: No acute cardiopulmonary abnormality noted.      Assessment & Plan     1. Myocardial infarction with nonobstructive coronary arteries (HCC)  Lipid Profile      2. Vasospastic angina (HCC)        3. Dyslipidemia  Lipid Profile          Patient doing well from a cardiovascular perspective.  Continue antianginal therapy with verapamil 40 mg twice daily.    Lipid panel reviewed which shows LDL within goal.  Obtain updated lipid panel.  Continue Lipitor 40 mg daily for nonobstructive CAD.    Blood pressure within goal.      All of patient and Radha's excellent  questions were answered to the best of my knowledge and to their satisfaction.  It was a pleasure seeing Ms. Gena Dykes in my clinic today. Return in about 1 year (around 5/8/2024). Patient is aware to call the cardiology clinic with any questions or concerns.      Ata Jacob MD  [facility]  [facility], [address]  [address] [address]  Phone: [phone]  Fax: [phone]    Please note that this dictation was created using voice recognition software. I have made every reasonable attempt to correct obvious errors, but it is possible there are errors of grammar and possibly content that I did not discover before finalizing the note.

## 2023-05-18 ENCOUNTER — TELEPHONE (OUTPATIENT)
Dept: CARDIOLOGY | Facility: MEDICAL CENTER | Age: 68
End: 2023-05-18
Payer: MEDICARE

## 2023-05-18 NOTE — TELEPHONE ENCOUNTER
----- Message from Ata Jacob M.D. sent at 5/18/2023 12:25 PM PDT -----  Can you please call patient's caregiver, Radha, and let her know that lipid panel is within range.   Thanks.

## 2023-05-18 NOTE — TELEPHONE ENCOUNTER
Called Radha [phone]  Relayed DA recommendations. Advised of no changes to POC and to continue ALL medications as prescribed. Radha verbalized understanding and is appreciative of call.

## 2023-05-22 ENCOUNTER — NON-PROVIDER VISIT (OUTPATIENT)
Dept: OCCUPATIONAL MEDICINE | Facility: CLINIC | Age: 68
End: 2023-05-22

## 2023-05-22 DIAGNOSIS — Z11.1 ENCOUNTER FOR PPD TEST: ICD-10-CM

## 2023-05-22 PROCEDURE — 86580 TB INTRADERMAL TEST: CPT | Performed by: NURSE PRACTITIONER

## 2023-05-24 ENCOUNTER — NON-PROVIDER VISIT (OUTPATIENT)
Dept: OCCUPATIONAL MEDICINE | Facility: CLINIC | Age: 68
End: 2023-05-24

## 2023-05-24 LAB — TB WHEAL 3D P 5 TU DIAM: NORMAL MM

## 2023-05-30 ENCOUNTER — NON-PROVIDER VISIT (OUTPATIENT)
Dept: OCCUPATIONAL MEDICINE | Facility: CLINIC | Age: 68
End: 2023-05-30

## 2023-05-30 DIAGNOSIS — Z11.1 ENCOUNTER FOR PPD TEST: ICD-10-CM

## 2023-05-30 PROCEDURE — 86580 TB INTRADERMAL TEST: CPT | Performed by: NURSE PRACTITIONER

## 2023-06-01 ENCOUNTER — NON-PROVIDER VISIT (OUTPATIENT)
Dept: OCCUPATIONAL MEDICINE | Facility: CLINIC | Age: 68
End: 2023-06-01

## 2023-06-01 LAB — TB WHEAL 3D P 5 TU DIAM: NORMAL MM

## 2023-10-05 ENCOUNTER — HOSPITAL ENCOUNTER (EMERGENCY)
Facility: MEDICAL CENTER | Age: 68
End: 2023-10-05
Attending: EMERGENCY MEDICINE
Payer: MEDICARE

## 2023-10-05 VITALS
HEART RATE: 98 BPM | TEMPERATURE: 97.7 F | WEIGHT: 150 LBS | BODY MASS INDEX: 26.58 KG/M2 | DIASTOLIC BLOOD PRESSURE: 98 MMHG | HEIGHT: 63 IN | OXYGEN SATURATION: 98 % | RESPIRATION RATE: 16 BRPM | SYSTOLIC BLOOD PRESSURE: 146 MMHG

## 2023-10-05 DIAGNOSIS — Z00.00 WELL ADULT HEALTH CHECK: ICD-10-CM

## 2023-10-05 LAB
ANION GAP SERPL CALC-SCNC: 7 MMOL/L (ref 7–16)
APPEARANCE UR: CLEAR
BACTERIA #/AREA URNS HPF: NEGATIVE /HPF
BASOPHILS # BLD AUTO: 0.3 % (ref 0–1.8)
BASOPHILS # BLD: 0.03 K/UL (ref 0–0.12)
BILIRUB UR QL STRIP.AUTO: NEGATIVE
BUN SERPL-MCNC: 14 MG/DL (ref 8–22)
CALCIUM SERPL-MCNC: 9.4 MG/DL (ref 8.5–10.5)
CHLORIDE SERPL-SCNC: 98 MMOL/L (ref 96–112)
CO2 SERPL-SCNC: 27 MMOL/L (ref 20–33)
COLOR UR: YELLOW
CREAT SERPL-MCNC: 0.82 MG/DL (ref 0.5–1.4)
EOSINOPHIL # BLD AUTO: 0.01 K/UL (ref 0–0.51)
EOSINOPHIL NFR BLD: 0.1 % (ref 0–6.9)
EPI CELLS #/AREA URNS HPF: NEGATIVE /HPF
ERYTHROCYTE [DISTWIDTH] IN BLOOD BY AUTOMATED COUNT: 51.9 FL (ref 35.9–50)
GFR SERPLBLD CREATININE-BSD FMLA CKD-EPI: 78 ML/MIN/1.73 M 2
GLUCOSE SERPL-MCNC: 97 MG/DL (ref 65–99)
GLUCOSE UR STRIP.AUTO-MCNC: NEGATIVE MG/DL
HCT VFR BLD AUTO: 42.2 % (ref 37–47)
HGB BLD-MCNC: 14.2 G/DL (ref 12–16)
HYALINE CASTS #/AREA URNS LPF: NORMAL /LPF
IMM GRANULOCYTES # BLD AUTO: 0.04 K/UL (ref 0–0.11)
IMM GRANULOCYTES NFR BLD AUTO: 0.4 % (ref 0–0.9)
KETONES UR STRIP.AUTO-MCNC: NEGATIVE MG/DL
LEUKOCYTE ESTERASE UR QL STRIP.AUTO: NEGATIVE
LYMPHOCYTES # BLD AUTO: 0.92 K/UL (ref 1–4.8)
LYMPHOCYTES NFR BLD: 9.6 % (ref 22–41)
MCH RBC QN AUTO: 30.5 PG (ref 27–33)
MCHC RBC AUTO-ENTMCNC: 33.6 G/DL (ref 32.2–35.5)
MCV RBC AUTO: 90.8 FL (ref 81.4–97.8)
MICRO URNS: ABNORMAL
MONOCYTES # BLD AUTO: 0.55 K/UL (ref 0–0.85)
MONOCYTES NFR BLD AUTO: 5.7 % (ref 0–13.4)
NEUTROPHILS # BLD AUTO: 8.05 K/UL (ref 1.82–7.42)
NEUTROPHILS NFR BLD: 83.9 % (ref 44–72)
NITRITE UR QL STRIP.AUTO: NEGATIVE
NRBC # BLD AUTO: 0 K/UL
NRBC BLD-RTO: 0 /100 WBC (ref 0–0.2)
PH UR STRIP.AUTO: 6.5 [PH] (ref 5–8)
PLATELET # BLD AUTO: 276 K/UL (ref 164–446)
PMV BLD AUTO: 10 FL (ref 9–12.9)
POTASSIUM SERPL-SCNC: 4.2 MMOL/L (ref 3.6–5.5)
PROT UR QL STRIP: NEGATIVE MG/DL
RBC # BLD AUTO: 4.65 M/UL (ref 4.2–5.4)
RBC # URNS HPF: NORMAL /HPF
RBC UR QL AUTO: ABNORMAL
SODIUM SERPL-SCNC: 132 MMOL/L (ref 135–145)
SP GR UR STRIP.AUTO: 1
UROBILINOGEN UR STRIP.AUTO-MCNC: 0.2 MG/DL
WBC # BLD AUTO: 9.6 K/UL (ref 4.8–10.8)
WBC #/AREA URNS HPF: NORMAL /HPF

## 2023-10-05 PROCEDURE — 99284 EMERGENCY DEPT VISIT MOD MDM: CPT

## 2023-10-05 PROCEDURE — 85025 COMPLETE CBC W/AUTO DIFF WBC: CPT

## 2023-10-05 PROCEDURE — 80048 BASIC METABOLIC PNL TOTAL CA: CPT

## 2023-10-05 PROCEDURE — 36415 COLL VENOUS BLD VENIPUNCTURE: CPT

## 2023-10-05 PROCEDURE — 81001 URINALYSIS AUTO W/SCOPE: CPT

## 2023-10-05 ASSESSMENT — FIBROSIS 4 INDEX: FIB4 SCORE: 2.3

## 2023-10-05 NOTE — ED PROVIDER NOTES
ED Provider Note    CHIEF COMPLAINT  Chief Complaint   Patient presents with    Weakness     BIB EMS from [facility] for weakness. Patient's caregiver states she is more weak than normal in her lower extremities. Reports she was having difficulty walking earlier this morning but has now resolved.        EXTERNAL RECORDS REVIEWED  Outpatient Notes - Seen in cardiology clinic 2023. Has a history of paranoid schizophrenia.    HPI/ROS  LIMITATION TO HISTORY   Select: Behavior  OUTSIDE HISTORIAN(S):  None    Gena Dykes is a 67 y.o. female who presents for unclear reasons.  Per nursing staff, EMS was contacted by staff at her group home because she woke up late this morning and appeared more weak than normal.  The patient herself states that she feels like she is losing control and her soul is leaving her body.  She notes that staff at the [facility] are in charge of her medications and she takes them all as prescribed.  She denies any fevers or chills, chest pain or shortness of breath, abdominal pain, nausea or vomiting, dysuria.  She has no SI or HI.  She denies any alcohol or illicit drug use.    PAST MEDICAL HISTORY       SURGICAL HISTORY   has a past surgical history that includes lumpectomy (Left) and mass excision general (2017).    FAMILY HISTORY  Family History   Problem Relation Age of Onset    Heart Disease Mother        SOCIAL HISTORY  Social History     Tobacco Use    Smoking status: Former     Current packs/day: 0.00     Types: Cigarettes     Quit date: 2011     Years since quittin.8    Smokeless tobacco: Never    Tobacco comments:     smoked for 45 years   Vaping Use    Vaping Use: Never used   Substance and Sexual Activity    Alcohol use: No     Alcohol/week: 0.0 oz    Drug use: No    Sexual activity: Not on file       CURRENT MEDICATIONS  Home Medications       Reviewed by Pat Nieves R.N. (Registered Nurse) on 10/05/23 at 1254  Med List Status: Partial  "    Medication Last Dose Status   Asenapine Maleate 10 MG SL Tab  Active   atorvastatin (LIPITOR) 40 MG Tab  Active   Calcium Carb-Cholecalciferol (CALCIUM + VITAMIN D3) 600-5 MG-MCG Tab  Active   clozapine (CLOZARIL) 100 MG TABS  Active   docusate calcium (SURFAK) 240 MG Cap  Active   Multiple Vitamin (MULTIVITAMIN) Tab  Active   Omega-3 Fatty Acids (FISH OIL) 1000 MG Cap capsule  Active   polyethylene glycol/lytes (MIRALAX) Pack  Active   verapamil (ISOPTIN) 40 MG Tab  Active                    ALLERGIES  Allergies   Allergen Reactions    Azithromycin     Fluphenazine      hallucinations    Haldol [Haloperidol]     Erythromycin      Pt on Clozapine/no erythromycin    Haldol [Haloperidol Lactate]      Does not tolerate       PHYSICAL EXAM  VITAL SIGNS: BP (!) 151/102   Pulse 92   Temp 36.7 °C (98 °F) (Temporal)   Resp 18   Ht 1.6 m (5' 3\")   Wt 68 kg (150 lb)   LMP 07/03/2000 (Approximate)   SpO2 94%   BMI 26.57 kg/m²    Physical Exam  Vitals and nursing note reviewed.   Constitutional:       Appearance: Normal appearance.   HENT:      Head: Normocephalic and atraumatic.      Right Ear: External ear normal.      Left Ear: External ear normal.      Nose: Nose normal.      Mouth/Throat:      Mouth: Mucous membranes are moist.      Pharynx: Oropharynx is clear.   Eyes:      Extraocular Movements: Extraocular movements intact.      Conjunctiva/sclera: Conjunctivae normal.      Pupils: Pupils are equal, round, and reactive to light.   Cardiovascular:      Rate and Rhythm: Normal rate and regular rhythm.   Pulmonary:      Effort: Pulmonary effort is normal.      Breath sounds: Normal breath sounds.   Abdominal:      Palpations: Abdomen is soft.      Tenderness: There is no abdominal tenderness.   Musculoskeletal:         General: Normal range of motion.      Cervical back: Normal range of motion and neck supple.   Skin:     General: Skin is warm and dry.   Neurological:      General: No focal deficit present.     "  Mental Status: She is alert and oriented to person, place, and time.   Psychiatric:      Comments: Odd affect but pleasant and cooperative.       DIAGNOSTIC STUDIES / PROCEDURES  LABS  Results for orders placed or performed during the hospital encounter of 10/05/23   CBC WITH DIFFERENTIAL   Result Value Ref Range    WBC 9.6 4.8 - 10.8 K/uL    RBC 4.65 4.20 - 5.40 M/uL    Hemoglobin 14.2 12.0 - 16.0 g/dL    Hematocrit 42.2 37.0 - 47.0 %    MCV 90.8 81.4 - 97.8 fL    MCH 30.5 27.0 - 33.0 pg    MCHC 33.6 32.2 - 35.5 g/dL    RDW 51.9 (H) 35.9 - 50.0 fL    Platelet Count 276 164 - 446 K/uL    MPV 10.0 9.0 - 12.9 fL    Neutrophils-Polys 83.90 (H) 44.00 - 72.00 %    Lymphocytes 9.60 (L) 22.00 - 41.00 %    Monocytes 5.70 0.00 - 13.40 %    Eosinophils 0.10 0.00 - 6.90 %    Basophils 0.30 0.00 - 1.80 %    Immature Granulocytes 0.40 0.00 - 0.90 %    Nucleated RBC 0.00 0.00 - 0.20 /100 WBC    Neutrophils (Absolute) 8.05 (H) 1.82 - 7.42 K/uL    Lymphs (Absolute) 0.92 (L) 1.00 - 4.80 K/uL    Monos (Absolute) 0.55 0.00 - 0.85 K/uL    Eos (Absolute) 0.01 0.00 - 0.51 K/uL    Baso (Absolute) 0.03 0.00 - 0.12 K/uL    Immature Granulocytes (abs) 0.04 0.00 - 0.11 K/uL    NRBC (Absolute) 0.00 K/uL   Basic Metabolic Panel   Result Value Ref Range    Sodium 132 (L) 135 - 145 mmol/L    Potassium 4.2 3.6 - 5.5 mmol/L    Chloride 98 96 - 112 mmol/L    Co2 27 20 - 33 mmol/L    Glucose 97 65 - 99 mg/dL    Bun 14 8 - 22 mg/dL    Creatinine 0.82 0.50 - 1.40 mg/dL    Calcium 9.4 8.5 - 10.5 mg/dL    Anion Gap 7.0 7.0 - 16.0   URINALYSIS,CULTURE IF INDICATED    Specimen: Urine, Clean Catch   Result Value Ref Range    Color Yellow     Character Clear     Specific Gravity 1.003 <1.035    Ph 6.5 5.0 - 8.0    Glucose Negative Negative mg/dL    Ketones Negative Negative mg/dL    Protein Negative Negative mg/dL    Bilirubin Negative Negative    Urobilinogen, Urine 0.2 Negative    Nitrite Negative Negative    Leukocyte Esterase Negative Negative    Occult  Blood Small (A) Negative    Micro Urine Req Microscopic    URINE MICROSCOPIC (W/UA)   Result Value Ref Range    WBC 0-2 /hpf    RBC 0-2 /hpf    Bacteria Negative None /hpf    Epithelial Cells Negative /hpf    Hyaline Cast 0-2 /lpf   ESTIMATED GFR   Result Value Ref Range    GFR (CKD-EPI) 78 >60 mL/min/1.73 m 2     RADIOLOGY  Radiologist interpretation:   No orders to display     COURSE & MEDICAL DECISION MAKING    ED Observation Status? No; Patient does not meet criteria for ED Observation.     INITIAL ASSESSMENT, COURSE AND PLAN  Care Narrative: This is a 67 year old female who was brought here from her group home by EMS after she appeared weaker than her baseline. The patient herself denies any weakness or physical symptoms. She is AF with normal VS and appears well hydrated and non-toxic. Exam is reassuring without any focal abnormalities other than an odd affect which I suspect is due to her underlying psychiatric disease. No SI or HI. She does not appear to be responding to internal stimuli. She is ambulating without difficulty. Moves all extremities equally.     Labs and UA are reassuring. Nursing staff contacted staff at the group home who are comfortable with her returning if she has no complaints and her labs are normal. Discharged in good and stable condition with strict return precautions.    ADDITIONAL PROBLEM LIST  None  DISPOSITION AND DISCUSSIONS  I have discussed management of the patient with the following physicians and JOSE DANIEL's:  None    Discussion of management with other [facility] or appropriate source(s): None     Escalation of care considered, and ultimately not performed:acute inpatient care management, however at this time, the patient is most appropriate for outpatient management    Barriers to care at this time, including but not limited to:  None .     Decision tools and prescription drugs considered including, but not limited to:  None .    FINAL DIAGNOSIS  1. Well adult health check       Electronically signed by: Justo Miles M.D., 10/5/2023 1:43 PM

## 2023-10-05 NOTE — ED NOTES
Spoke with pt's caregiver at [facility] and they deny any other concerns. Caregiver to transport pt back home.

## 2023-10-05 NOTE — ED TRIAGE NOTES
Chief Complaint   Patient presents with    Weakness     BIB EMS from [facility] for weakness. Patient's caregiver states she is more weak than normal in her lower extremities. Reports she was having difficulty walking earlier this morning but has now resolved.      Negative stroke assessment   Hx schizophrenia and paranoia.     [facility] 105

## 2023-10-05 NOTE — ED NOTES
H & P


Time Seen by Provider: 01/27/19 08:09


HPI/ROS: 





CHIEF COMPLAINT:  Arm wound





History by patient





HISTORY OF PRESENT ILLNESS:  39-year-old man presents complaining of wound on 

his left forearm.  Patient is a regular methamphetamine user and states that he 

developed an abscess although he is not sure if he shot in that area or not.  

He says that he then used a needle to trying puncture at and had pus draining 

out on this continues to drain and be swollen and painful.  He has place iodine 

on the wound as well as tried to debrided himself.  Patient has a history of 

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  Because aneurysm he had left-

sided weakness and although he has predominantly left-handed he now uses his 

right hand.  He denies picking at the lesion but does admit to regular 

methamphetamine use which he says he usually smokes not injected.  He has no 

prior history of abscesses or MRSA that he knows of.





REVIEW OF SYSTEMS:


As in HPI, and all other systems reviewed and are negative


Smoking Status: Current every day smoker


Physical Exam: 





General Appearance:  Alert and no distress.  Afebrile.


Head:  Normocephalic, atraumatic


Eyes:  Pupils equal and round no injection.  Extraocular movements are intact.


Musculoskeletal:  Neck is supple and nontender.


Extremities:  Left arm positive 3 by 4 cm open wound with granulation tissue, 

serous drainage and no obvious purulence or odor.  There is scant surrounding 

erythema with minimal tenderness.  Patient has full range of motion of his left 

elbow, wrist and all fingers.  Radial, median and ulnar nerve function are 

intact both motor and sensory although he has some subjective sensory deficits 

versus his right which he says are chronic, radial pulses 2+ and equal to the 

right, distal cap refills less than 2 sec..


Skin:  No rashes or lesions except as described above.








Constitutional: 


 Initial Vital Signs











Temperature (C)  37.3 C   01/27/19 08:10


 


Heart Rate  104 H  01/27/19 08:10


 


Respiratory Rate  18   01/27/19 08:10


 


Blood Pressure  167/107 H  01/27/19 08:10


 


O2 Sat (%)  98   01/27/19 08:10








 











O2 Delivery Mode               Room Air














Allergies/Adverse Reactions: 


 





No Known Allergies Allergy (Unverified 01/27/19 08:09)


 








Home Medications: 














 Medication  Instructions  Recorded


 


Sulfamethox/Tmp 800/160 mg 1 tab PO BID #14 tab 01/27/19





[Bactrim Ds]  














MDM/Departure





- [facility]


ED Course/Re-evaluation: 





39-year-old man presents with draining wound on left arm likely related to 

methamphetamine injection.  There is no evidence of drainable abscess.  

Although patient denies skin picking, I am concerned that this is partially 

related to that given the superficial nature of the wound and the uneven edges.

  There is no evidence of fever or serious systemic toxicity or nerve or tendon 

involvement.  I will give the patient a trial of MRSA covering antibiotics and 

wound care.  Patient states he can follow up with her physician in [place] near 

where he lives.





- Depart


Disposition: Home, Routine, Self-Care


Clinical Impression: 


 Abscess, Elevated blood pressure reading





Condition: Good


Instructions:  Abscess (ED)


Additional Instructions: 


You were seen by Dr. Mary Anne Hernandez today.





Soak your arm 3 times a day in warm soapy water.  Do not put hydrogen peroxide 

directly on your wound.  Do not put iodine solution directly on your wound.  Do 

not touch or pick at your wound.  Keep it covered with a wet to dry wound 

dressing.





Take antibiotics as prescribed.  I recommend taking probiotics in between doses 

of antibiotics.





Please follow-up for wound recheck in 48 hr and sooner if worse including but 

not limited to fever, increased pain, spreading or streaking redness or 

difficulty using her left hand.





Your blood pressure was elevated today.  This is likely related to your 

methamphetamine use.  Stop using methamphetamines!  They will kill you!





 Return for any worsening or new concerns.


Prescriptions: 


Sulfamethox/Tmp 800/160 mg [Bactrim Ds] 1 tab PO BID #14 tab


Referrals: 


Patient,NotPresent [Primary Care Provider] - As per Instructions Patient and caregiver provided discharge instructions, verbalizes understanding and denies any further questions. Patient instructed to return if condition worsens. No distress noted. Patient ambulated to the front lobby and assisted into private vehicle.

## 2023-10-05 NOTE — DISCHARGE INSTRUCTIONS
You were seen in the ER for an evaluation at the request of your group home staff.  Your labs and urine were reassuring and did not suggest infection or acute abnormality.  You are safe for discharge.  Follow-up with your primary care physician and return with any new or worsening symptoms.

## 2023-10-23 ENCOUNTER — TELEPHONE (OUTPATIENT)
Dept: CARDIOLOGY | Facility: MEDICAL CENTER | Age: 68
End: 2023-10-23
Payer: MEDICARE

## 2023-10-23 NOTE — TELEPHONE ENCOUNTER
DA    Patients caregiver Radha stopped by the office with medication questions. Patient is scheduled to have a Colonoscopy on Wednesday at [facility]. Patient was prescribed Magnesium to clean out her system, Radha would like to know if this medication is okay to take since patient is on Verapamil 40MG & Atorvastatin 40MG. Radha would like a call back at [phone] before 6pm as patients is schedule to take Magnesium at 6pm.    Thank you!   Walk in form scanned in Media

## 2023-10-23 NOTE — TELEPHONE ENCOUNTER
Phone Number Called: [phone]    Call outcome:  Spoke w/ Radha    Message: Called to discuss patient concerns with Mag citrate and cardiac meds. Advise that we did not receive a clearance request and that any concerns with interactions can be addressed with prescribing office. Caregiver advised that med rec was done and they are aware of what she is on but she was just concerned. She was reading the medication bottle. Pt will follow up with prescribing office and advise them to reach out to us via phone or fax with any questions or concerns.

## 2023-12-12 DIAGNOSIS — I21.9 MYOCARDIAL INFARCTION WITH NONOBSTRUCTIVE CORONARY ARTERIES (HCC): ICD-10-CM

## 2023-12-13 RX ORDER — VERAPAMIL HYDROCHLORIDE 40 MG/1
40 TABLET ORAL 2 TIMES DAILY
Qty: 180 TABLET | Refills: 1 | Status: SHIPPED | OUTPATIENT
Start: 2023-12-13

## 2023-12-13 NOTE — TELEPHONE ENCOUNTER
Is the patient due for a refill? Yes    Was the patient seen the past year? Yes    Date of last office visit: 5/08/23    Does the patient have an upcoming appointment?  No    Provider to refill:AK, ADD    Does the patients insurance require a 100 day supply?  No

## 2023-12-22 RX ORDER — DOCUSATE CALCIUM 240 MG
240 CAPSULE ORAL DAILY
Qty: 30 CAPSULE | Refills: 11 | Status: SHIPPED | OUTPATIENT
Start: 2023-12-22

## 2023-12-26 ENCOUNTER — OFFICE VISIT (OUTPATIENT)
Dept: MEDICAL GROUP | Facility: CLINIC | Age: 68
End: 2023-12-26
Payer: MEDICARE

## 2023-12-26 VITALS
WEIGHT: 148.9 LBS | HEART RATE: 94 BPM | OXYGEN SATURATION: 98 % | TEMPERATURE: 97.5 F | BODY MASS INDEX: 26.38 KG/M2 | DIASTOLIC BLOOD PRESSURE: 74 MMHG | HEIGHT: 63 IN | SYSTOLIC BLOOD PRESSURE: 118 MMHG
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
## 2023-12-26 DIAGNOSIS — Z11.59 NEED FOR HEPATITIS C SCREENING TEST: ICD-10-CM

## 2023-12-26 DIAGNOSIS — E78.5 DYSLIPIDEMIA: ICD-10-CM

## 2023-12-26 DIAGNOSIS — Z12.31 SCREENING MAMMOGRAM FOR BREAST CANCER: ICD-10-CM

## 2023-12-26 DIAGNOSIS — Z78.0 POSTMENOPAUSAL: ICD-10-CM

## 2023-12-26 DIAGNOSIS — Z23 NEED FOR VACCINATION: ICD-10-CM

## 2023-12-26 PROBLEM — Z09 HOSPITAL DISCHARGE FOLLOW-UP: Status: RESOLVED | Noted: 2022-09-15 | Resolved: 2023-12-26

## 2023-12-26 PROCEDURE — 3074F SYST BP LT 130 MM HG: CPT | Performed by: HEALTH CARE PROVIDER

## 2023-12-26 PROCEDURE — 90662 IIV NO PRSV INCREASED AG IM: CPT | Performed by: HEALTH CARE PROVIDER

## 2023-12-26 PROCEDURE — 3078F DIAST BP <80 MM HG: CPT | Performed by: HEALTH CARE PROVIDER

## 2023-12-26 PROCEDURE — G0008 ADMIN INFLUENZA VIRUS VAC: HCPCS | Performed by: HEALTH CARE PROVIDER

## 2023-12-26 PROCEDURE — 99213 OFFICE O/P EST LOW 20 MIN: CPT | Mod: 25,GE | Performed by: HEALTH CARE PROVIDER

## 2023-12-26 ASSESSMENT — FIBROSIS 4 INDEX: FIB4 SCORE: 1.74

## 2023-12-26 NOTE — ASSESSMENT & PLAN NOTE
Multiple falls in past year, possibly due to medications. Will refer to PT as well for strengthening exercises.

## 2023-12-26 NOTE — PROGRESS NOTES
[facility]    Subjective:     CC: Routine Follow up    HPI:   Gena is a 67 y.o. female with past medical history of schizophrenia, MINOCA, HLD, falls presenting today for follow up.    She continues to follow up with Cardiology regularly for MINOCA. Remains on verapamil 40 mg BID and atorvastatin 40 mg daily. ASA discontinued per pt and caregiver.    She continues to see Psychiatry for paranoid schizophrenia and is stable on clozapine. She has experienced intermittent falls in past year but has not struck head. She is interested in PT for strength.    Problem   Postmenopausal   Dyslipidemia   Fall   Screening Mammogram for Breast Cancer   Hospital Discharge Follow-Up (Resolved)       Current Outpatient Medications Ordered in Epic   Medication Sig Dispense Refill    docusate calcium (SURFAK) 240 MG Cap TAKE 1 CAPSULE BY MOUTH ONCE DAILY 30 Capsule 11    Calcium Carb-Cholecalciferol (CALCIUM + VITAMIN D3) 600-5 MG-MCG Tab Take 1 Tablet by mouth every day. 90 Tablet 0    atorvastatin (LIPITOR) 40 MG Tab Take 1 Tablet by mouth every evening. 100 Tablet 3    Omega-3 Fatty Acids (FISH OIL) 1000 MG Cap capsule Take 1,000 mg by mouth 2 times a day.      Multiple Vitamin (MULTIVITAMIN) Tab TAKE 1 TABLET BY MOUTH ONCE DAILY 30 Tablet 11    polyethylene glycol/lytes (MIRALAX) Pack Take 17 g by mouth every bedtime.      clozapine (CLOZARIL) 100 MG TABS Take 100-300 mg by mouth in the morning, at noon, and at bedtime. Take 200 mg in am, 100 mg in the afternoon, 300 mg at night      verapamil (ISOPTIN) 40 MG Tab TAKE 1 TABLET BY MOUTH TWICE DAILY (Patient not taking: Reported on 12/26/2023) 180 Tablet 1    Asenapine Maleate 10 MG SL Tab Place 1 Tablet under the tongue 2 times a day. (Patient not taking: Reported on 12/26/2023)       No current [facility]-ordered facility-administered medications on file.         ROS:  Gen: no fevers/chills, no changes in weight  Eyes: no changes in vision  ENT: no sore throat, no hearing  "loss, no bloody nose  Pulm: no sob, no cough  CV: no chest pain, no palpitations  GI: no nausea/vomiting, no diarrhea  : no dysuria  MSk: no myalgias  Skin: no rash  Neuro: no headaches, no numbness/tingling  Heme/Lymph: no easy bruising    Preventative Care Recommendations:     Age 21+:  One time HIV Screening:  Negative  One time HCV Screening:  Ordered  Td booster in past 10 years: Up to date  Routine pap testing every 3 years until 30 then HPV co-testing every 5 years (End at age 65): All negative     Age 35+:  Overweight or obese - Hgb A1c to screen for prediabetes (End at age 70):  NA     Age 45+:  Colorectal cancer screening (End at age 75): Seen by GI in 2023, no further colonoscopies     Age 50+:  Shingrix RZV Zoster Vaccine (2 doses  by 2-6 months): Recommended  Biennial screening mammogram (End at age 75): Ordered  Tobacco use with 20 pack-year smoking history and currently smoke or have quit within the past 15 years - Annual low-dose chest CT (Ends at age 80) Declined     Age 65+:  One time Prevnar PCV20 Pneumonia Vaccine (May perform after age 18 yo for chronic heart, liver, lung, DM or tobacco/alcohol use): Declined  DEXA Scan for bone mineral density / osteoporosis: Ordered      Objective:     Exam:  /74 (BP Location: Left arm, Patient Position: Sitting, BP Cuff Size: Adult)   Pulse 94   Temp 36.4 °C (97.5 °F) (Temporal)   Ht 1.6 m (5' 3\")   Wt 67.5 kg (148 lb 14.4 oz)   LMP 07/03/2000 (Approximate)   SpO2 98%   BMI 26.38 kg/m²  Body mass index is 26.38 kg/m².    Gen:  Alert and oriented, No apparent distress.  HEENT: Neck is supple without lymphadenopathy, EOMI, no pharyngeal erythema or exudate  Lungs:  Normal effort, CTA bilaterally, no wheezes, rhonchi, or rales  CV:  Regular rate and rhythm. No murmurs, rubs, or gallops.  Abd:      Soft, non-tender, non-distended  Ext:  No clubbing, cyanosis, edema.      Labs:     Results for orders placed or performed during the hospital " encounter of 10/05/23   CBC WITH DIFFERENTIAL   Result Value Ref Range    WBC 9.6 4.8 - 10.8 K/uL    RBC 4.65 4.20 - 5.40 M/uL    Hemoglobin 14.2 12.0 - 16.0 g/dL    Hematocrit 42.2 37.0 - 47.0 %    MCV 90.8 81.4 - 97.8 fL    MCH 30.5 27.0 - 33.0 pg    MCHC 33.6 32.2 - 35.5 g/dL    RDW 51.9 (H) 35.9 - 50.0 fL    Platelet Count 276 164 - 446 K/uL    MPV 10.0 9.0 - 12.9 fL    Neutrophils-Polys 83.90 (H) 44.00 - 72.00 %    Lymphocytes 9.60 (L) 22.00 - 41.00 %    Monocytes 5.70 0.00 - 13.40 %    Eosinophils 0.10 0.00 - 6.90 %    Basophils 0.30 0.00 - 1.80 %    Immature Granulocytes 0.40 0.00 - 0.90 %    Nucleated RBC 0.00 0.00 - 0.20 /100 WBC    Neutrophils (Absolute) 8.05 (H) 1.82 - 7.42 K/uL    Lymphs (Absolute) 0.92 (L) 1.00 - 4.80 K/uL    Monos (Absolute) 0.55 0.00 - 0.85 K/uL    Eos (Absolute) 0.01 0.00 - 0.51 K/uL    Baso (Absolute) 0.03 0.00 - 0.12 K/uL    Immature Granulocytes (abs) 0.04 0.00 - 0.11 K/uL    NRBC (Absolute) 0.00 K/uL   Basic Metabolic Panel   Result Value Ref Range    Sodium 132 (L) 135 - 145 mmol/L    Potassium 4.2 3.6 - 5.5 mmol/L    Chloride 98 96 - 112 mmol/L    Co2 27 20 - 33 mmol/L    Glucose 97 65 - 99 mg/dL    Bun 14 8 - 22 mg/dL    Creatinine 0.82 0.50 - 1.40 mg/dL    Calcium 9.4 8.5 - 10.5 mg/dL    Anion Gap 7.0 7.0 - 16.0   URINALYSIS,CULTURE IF INDICATED    Specimen: Urine, Clean Catch   Result Value Ref Range    Color Yellow     Character Clear     Specific Gravity 1.003 <1.035    Ph 6.5 5.0 - 8.0    Glucose Negative Negative mg/dL    Ketones Negative Negative mg/dL    Protein Negative Negative mg/dL    Bilirubin Negative Negative    Urobilinogen, Urine 0.2 Negative    Nitrite Negative Negative    Leukocyte Esterase Negative Negative    Occult Blood Small (A) Negative    Micro Urine Req Microscopic    URINE MICROSCOPIC (W/UA)   Result Value Ref Range    WBC 0-2 /hpf    RBC 0-2 /hpf    Bacteria Negative None /hpf    Epithelial Cells Negative /hpf    Hyaline Cast 0-2 /lpf   ESTIMATED  GFR   Result Value Ref Range    GFR (CKD-EPI) 78 >60 mL/min/1.73 m 2         Assessment & Plan:     67 y.o. female with the following -     Problem List Items Addressed This Visit       Screening mammogram for breast cancer     History of breast cancer, last mammogram in 2022 benign. Mammogram ordered today.         Relevant Orders    MA-SCREENING MAMMO BILAT W/IMPLANTS W/JUAN CARLOS W/CAD    Fall     Multiple falls in past year, possibly due to medications. Will refer to PT as well for strengthening exercises.         Relevant Orders    Referral to Physical Therapy    CBC WITH DIFFERENTIAL    Comp Metabolic Panel    Dyslipidemia     Chronic, stable. Continue atorvastatin 40 mg daily.         Postmenopausal     One time DEXA screening ordered due to postmenopausal female > 66 yo.         Relevant Orders    DS-BONE DENSITY STUDY (DEXA)     Other Visit Diagnoses       Need for vaccination        Relevant Orders    Influenza Vaccine, High Dose (65+ Only)    Need for hepatitis C screening test        Relevant Orders    HEP C VIRUS ANTIBODY              Follow up in 3 months        No future appointments.      Adam Soto MD  [facility]  PGY-3

## 2023-12-28 ENCOUNTER — HOSPITAL ENCOUNTER (OUTPATIENT)
Dept: RADIOLOGY | Facility: MEDICAL CENTER | Age: 68
End: 2023-12-28
Attending: HEALTH CARE PROVIDER
Payer: MEDICARE

## 2023-12-28 DIAGNOSIS — Z12.31 SCREENING MAMMOGRAM FOR BREAST CANCER: ICD-10-CM

## 2023-12-28 PROCEDURE — 77063 BREAST TOMOSYNTHESIS BI: CPT

## 2024-01-09 ENCOUNTER — TELEPHONE (OUTPATIENT)
Dept: MEDICAL GROUP | Facility: CLINIC | Age: 69
End: 2024-01-09
Payer: MEDICARE

## 2024-01-09 NOTE — TELEPHONE ENCOUNTER
----- Message from Adam Soto M.D. sent at 1/8/2024 12:03 PM PST -----  Please call Gena and let her know that labs looked good. No other testing needed at this time. Thanks!

## 2024-01-10 DIAGNOSIS — E78.5 DYSLIPIDEMIA: ICD-10-CM

## 2024-01-10 DIAGNOSIS — I21.9 MYOCARDIAL INFARCTION WITH NONOBSTRUCTIVE CORONARY ARTERIES (HCC): ICD-10-CM

## 2024-01-10 RX ORDER — ATORVASTATIN CALCIUM 40 MG/1
40 TABLET, FILM COATED ORAL EVERY EVENING
Qty: 90 TABLET | Refills: 1 | Status: SHIPPED | OUTPATIENT
Start: 2024-01-10

## 2024-01-10 NOTE — TELEPHONE ENCOUNTER
Received request via: Pharmacy    Was the patient seen in the last year in this department? Yes    Does the patient have an active prescription (recently filled or refills available) for medication(s) requested? Yes.     Does the patient have [facility] and need 100 day supply (blood pressure, diabetes and cholesterol meds only)? no

## 2024-01-10 NOTE — TELEPHONE ENCOUNTER
Is the patient due for a refill? Yes    Was the patient seen the past year? Yes    Date of last office visit: 05/08/2023    Does the patient have an upcoming appointment?  No   If yes, When?     Provider to refill:AL    Does the patients insurance require a 100 day supply?  No

## 2024-01-11 RX ORDER — CALCIUM CARBONATE/VITAMIN D3 600MG-5MCG
1 TABLET ORAL DAILY
Qty: 30 TABLET | Refills: 11 | Status: SHIPPED | OUTPATIENT
Start: 2024-01-11

## 2024-02-20 ENCOUNTER — HOSPITAL ENCOUNTER (OUTPATIENT)
Dept: RADIOLOGY | Facility: MEDICAL CENTER | Age: 69
End: 2024-02-20
Attending: HEALTH CARE PROVIDER
Payer: MEDICARE

## 2024-02-20 DIAGNOSIS — Z78.0 POSTMENOPAUSAL: ICD-10-CM

## 2024-02-20 PROCEDURE — 77080 DXA BONE DENSITY AXIAL: CPT

## 2024-02-21 NOTE — RESULT ENCOUNTER NOTE
Please call patient and let her know that her recent DEXA scan shows mildly decreased bone mineral density called osteopenia, but no osteoporosis. No need to treat with medicine unless she has had recent fractures. Would recommend repeat DEXA in about 5 years.

## 2024-02-23 ENCOUNTER — TELEPHONE (OUTPATIENT)
Dept: MEDICAL GROUP | Facility: CLINIC | Age: 69
End: 2024-02-23
Payer: MEDICARE

## 2024-02-23 NOTE — TELEPHONE ENCOUNTER
----- Message from Adam Soto M.D. sent at 2/21/2024  7:33 AM PST -----  Please call patient and let her know that her recent DEXA scan shows mildly decreased bone mineral density called osteopenia, but no osteoporosis. No need to treat with medicine unless she has had recent fractures. Would recommend repeat DEXA in about 5 years.

## 2024-02-23 NOTE — TELEPHONE ENCOUNTER
Phone Number Called: [phone] (home)       Call outcome: Spoke to patient regarding message below.    Message: patient informed of the results of her DEXA scan results.

## 2024-03-18 ASSESSMENT — ENCOUNTER SYMPTOMS
WHEEZING: 0
FOCAL WEAKNESS: 0
PALPITATIONS: 0
SYNCOPE: 0
VOMITING: 0
DYSPNEA ON EXERTION: 0
ORTHOPNEA: 0
DIARRHEA: 0
NIGHT SWEATS: 0
NEAR-SYNCOPE: 0
FEVER: 0
WEAKNESS: 0
NAUSEA: 0
SHORTNESS OF BREATH: 0
IRREGULAR HEARTBEAT: 0
DIZZINESS: 0
PND: 0
COUGH: 0
ABDOMINAL PAIN: 0

## 2024-03-18 NOTE — PROGRESS NOTES
Cardiology Follow-up Consultation Note    Date of note:    3/19/2024    Primary Care Provider: Adam Soto M.D.    Patient Name: Gena Dykes   YOB: 1955  MRN:              [id number]    Chief Complaint: Follow-up microvascular disease    History of Present Illness: Ms. Gena Dykes is a 68 y.o. female whose current medical problems include microvascular disease, dyslipidemia, history of breast cancer who is here for follow-up microvascular disease.    The patient was previously a patient of Dr. Jacob, last seen 5/8/2023.  Vasospasm was diagnosed from left heart cath per her prior cardiologist note.  She was doing well during the last visit, and no changes were made to her medications.    The patient presents today for follow-up. The patient presents with her caregiver, Radha. The patient reports feeling well today. She denies any chest pain or shortness of breath. She walks daily without any problem. She denies any orthponea, PND, or leg swelling. No palpitations. No syncope or presyncopal episodes.     Cardiovascular Risk Factors:  1. Smoking status: Former smoker  2. Type II Diabetes Mellitus: Prediabetes, diet controlled  Lab Results   Component Value Date/Time    HBA1C 5.8 (H) 02/24/2022 07:46 AM    HBA1C 5.9 (H) 02/24/2022 12:52 AM     3. Hypertension: None  4. Dyslipidemia: On statin  Cholesterol,Tot   Date Value Ref Range Status   05/12/2023 145 100 - 199 mg/dL Final   08/18/2022 159 100 - 199 mg/dL Final     LDL   Date Value Ref Range Status   08/18/2022 98 <100 mg/dL Final     HDL   Date Value Ref Range Status   05/12/2023 64 >39 mg/dL Final   08/18/2022 49 >=40 mg/dL Final     Triglycerides   Date Value Ref Range Status   05/12/2023 72 0 - 149 mg/dL Final   08/18/2022 59 0 - 149 mg/dL Final     5. Family history of early Coronary Artery Disease in a first degree relative (Male less than 55 years of age; Female less than 65 years of age): Mother with heart disease  6.   Obesity and/or Metabolic Syndrome: Body mass index is 25.69 kg/m².  7. Sedentary lifestyle: Active, walks daily    Review of Systems   Constitutional: Negative for fever, malaise/fatigue and night sweats.   Cardiovascular:  Negative for chest pain, dyspnea on exertion, irregular heartbeat, leg swelling, near-syncope, orthopnea, palpitations, paroxysmal nocturnal dyspnea and syncope.   Respiratory:  Negative for cough, shortness of breath and wheezing.    Gastrointestinal:  Negative for abdominal pain, diarrhea, nausea and vomiting.   Neurological:  Negative for dizziness, focal weakness and weakness.         All other systems reviewed and are negative.       Current Outpatient Medications   Medication Sig Dispense Refill    CALCIUM + VITAMIN D3 600-5 MG-MCG Tab TAKE 1 TABLET BY MOUTH ONCE DAILY 30 Tablet 11    atorvastatin (LIPITOR) 40 MG Tab TAKE 1 TABLET BY MOUTH EVERY EVENING. 90 Tablet 1    docusate calcium (SURFAK) 240 MG Cap TAKE 1 CAPSULE BY MOUTH ONCE DAILY 30 Capsule 11    verapamil (ISOPTIN) 40 MG Tab TAKE 1 TABLET BY MOUTH TWICE DAILY 180 Tablet 1    Omega-3 Fatty Acids (FISH OIL) 1000 MG Cap capsule Take 1,000 mg by mouth 2 times a day.      Multiple Vitamin (MULTIVITAMIN) Tab TAKE 1 TABLET BY MOUTH ONCE DAILY 30 Tablet 11    Asenapine Maleate 10 MG SL Tab Place 1 Tablet under the tongue 2 times a day.      polyethylene glycol/lytes (MIRALAX) Pack Take 17 g by mouth every bedtime.      clozapine (CLOZARIL) 100 MG TABS Take 100-300 mg by mouth in the morning, at noon, and at bedtime. Take 200 mg in am, 100 mg in the afternoon, 300 mg at night       No current facility-administered medications for this visit.         Allergies   Allergen Reactions    Azithromycin     Fluphenazine      hallucinations    Haldol [Haloperidol]     Erythromycin      Pt on Clozapine/no erythromycin    Haldol [Haloperidol Lactate]      Does not tolerate       Physical Exam:  Ambulatory Vitals  /60 (BP Location: Left arm,  "Patient Position: Sitting, BP Cuff Size: Adult)   Pulse 95   Resp 16   Ht 1.6 m (5' 3\")   Wt 65.8 kg (145 lb)   SpO2 96%    Oxygen Therapy:  Pulse Oximetry: 96 %  BP Readings from Last 4 Encounters:   03/19/24 100/60   12/26/23 118/74   10/05/23 (!) 146/98   05/08/23 116/56       Weight/BMI: Body mass index is 25.69 kg/m².  Wt Readings from Last 4 Encounters:   03/19/24 65.8 kg (145 lb)   12/26/23 67.5 kg (148 lb 14.4 oz)   10/05/23 68 kg (150 lb)   05/08/23 66.2 kg (146 lb)         General: Well appearing and in no apparent distress  Eyes: nl conjunctiva, no icteric sclera  ENT: normal external appearance of ears, nose, and throat  Neck: no visible JVP,  no carotid bruits  Lungs: normal respiratory effort, CTAB  Heart: RRR, no murmurs, no rubs or gallops,  no edema bilateral lower extremities. No LV/RV heave on cardiac palpatation. + bilateral radial pulses.  + bilateral dp pulses.   Abdomen: soft, non tender, non distended, no masses, normal bowel sounds.  No HSM.  Extremities/MSK: no clubbing, no cyanosis  Neurological: No focal sensory deficits  Psychiatric: Appropriate affect, A/O x 3, intact judgement and insight  Skin: Warm extremities      Lab Data Review:  Lab Results   Component Value Date/Time    CHOLSTRLTOT 145 05/12/2023 06:46 AM    CHOLSTRLTOT 159 08/18/2022 11:45 PM    LDL 98 08/18/2022 11:45 PM    HDL 64 05/12/2023 06:46 AM    HDL 49 08/18/2022 11:45 PM    TRIGLYCERIDE 72 05/12/2023 06:46 AM    TRIGLYCERIDE 59 08/18/2022 11:45 PM       Lab Results   Component Value Date/Time    SODIUM 137 01/05/2024 05:39 AM    SODIUM 132 (L) 10/05/2023 01:58 PM    POTASSIUM 4.5 01/05/2024 05:39 AM    POTASSIUM 4.2 10/05/2023 01:58 PM    CHLORIDE 100 01/05/2024 05:39 AM    CHLORIDE 98 10/05/2023 01:58 PM    CO2 23 01/05/2024 05:39 AM    CO2 27 10/05/2023 01:58 PM    GLUCOSE 93 01/05/2024 05:39 AM    GLUCOSE 97 10/05/2023 01:58 PM    BUN 13 01/05/2024 05:39 AM    BUN 14 10/05/2023 01:58 PM    CREATININE 0.91 " 01/05/2024 05:39 AM    CREATININE 0.82 10/05/2023 01:58 PM    BUNCREATRAT 14 01/05/2024 05:39 AM     Lab Results   Component Value Date/Time    ALKPHOSPHAT 127 (H) 01/05/2024 05:39 AM    ALKPHOSPHAT 91 10/26/2022 12:58 PM    ASTSGOT 25 01/05/2024 05:39 AM    ASTSGOT 30 10/26/2022 12:58 PM    ALTSGPT 14 01/05/2024 05:39 AM    ALTSGPT 17 10/26/2022 12:58 PM    TBILIRUBIN 0.3 01/05/2024 05:39 AM    TBILIRUBIN 0.3 10/26/2022 12:58 PM      Lab Results   Component Value Date/Time    WBC 11.7 (H) 01/05/2024 05:39 AM    WBC 9.6 10/05/2023 01:58 PM    HEMOGLOBIN 14.2 01/05/2024 05:39 AM    HEMOGLOBIN 14.2 10/05/2023 01:58 PM     Lab Results   Component Value Date/Time    HBA1C 5.8 (H) 02/24/2022 07:46 AM    HBA1C 5.9 (H) 02/24/2022 12:52 AM         Cardiac Imaging and Procedures Review:    EKG dated 8/18/2022: My personal interpretation is sinus rhythm with inferior ST elevation     EKG dated 10/26/2022: My personal interpretation is sinus rhythm without inferior ST elevation     Echo dated 8/19/2022:   CONCLUSIONS  Normal left ventricular size, thickness, systolic function, and   diastolic function.  Normal regional wall motion.    [facility] (8/18/2022):   Findings:  1.  Left main coronary artery:  Normal.  2.  Left anterior descending artery:  Luminal irregularities, diagonals are small.  3.  Left circumflex coronary artery:  Luminal irregularities. Gives two marginal branches, which have no significant disease   4.  Right coronary artery:  30-40% stenosis in the proximal right coronary artery disease. This is a right dominant system.  5.  Left ventricular end diastolic pressure:  29 mmHg.  No signficant gradient across the aortic valve.  6.  Left ventriculogram:  Ejection fraction of 60%.      Assessment & Plan     1. Nonobstructive atherosclerosis of coronary artery  Lipid Profile      2. Microvascular angina  Lipid Profile      3. Vasospastic angina (HCC)        4. Dyslipidemia  Lipid Profile            Shared Medical  Decision Making:    Microvascular angina  Nonobstructive CAD  Vasospasm  Dyslipidemia  Walks daily without any symptoms.  Symptoms appear to be well-controlled on current regimen.  -Continue verapamil 40mg twice daily  -Continue atorvastatin 40mg daily  -Repeat lipid panel prior to next visit    All of the patient's excellent questions were answered to the best of my knowledge and to her satisfaction.  It was a pleasure seeing Ms. Gena Dykes in my clinic today. Return in about 6 months (around 9/19/2024). Patient is aware to call the cardiology clinic with any questions or concerns.      Magdi Rodriguez MD  [facility]  [place] [facility], [address]  [address] [address]  Phone: [phone]  Fax: [phone]

## 2024-03-19 ENCOUNTER — OFFICE VISIT (OUTPATIENT)
Dept: CARDIOLOGY | Facility: MEDICAL CENTER | Age: 69
End: 2024-03-19
Attending: STUDENT IN AN ORGANIZED HEALTH CARE EDUCATION/TRAINING PROGRAM
Payer: MEDICARE

## 2024-03-19 VITALS
HEART RATE: 95 BPM | SYSTOLIC BLOOD PRESSURE: 100 MMHG | BODY MASS INDEX: 25.69 KG/M2 | RESPIRATION RATE: 16 BRPM | WEIGHT: 145 LBS | OXYGEN SATURATION: 96 % | DIASTOLIC BLOOD PRESSURE: 60 MMHG | HEIGHT: 63 IN

## 2024-03-19 DIAGNOSIS — I20.1 VASOSPASTIC ANGINA (HCC): ICD-10-CM

## 2024-03-19 DIAGNOSIS — E78.5 DYSLIPIDEMIA: ICD-10-CM

## 2024-03-19 DIAGNOSIS — I25.10 NONOBSTRUCTIVE ATHEROSCLEROSIS OF CORONARY ARTERY: ICD-10-CM

## 2024-03-19 DIAGNOSIS — I20.89 MICROVASCULAR ANGINA (HCC): ICD-10-CM

## 2024-03-19 PROCEDURE — 3078F DIAST BP <80 MM HG: CPT | Performed by: STUDENT IN AN ORGANIZED HEALTH CARE EDUCATION/TRAINING PROGRAM

## 2024-03-19 PROCEDURE — 99212 OFFICE O/P EST SF 10 MIN: CPT | Performed by: STUDENT IN AN ORGANIZED HEALTH CARE EDUCATION/TRAINING PROGRAM

## 2024-03-19 PROCEDURE — 3074F SYST BP LT 130 MM HG: CPT | Performed by: STUDENT IN AN ORGANIZED HEALTH CARE EDUCATION/TRAINING PROGRAM

## 2024-03-19 PROCEDURE — 99214 OFFICE O/P EST MOD 30 MIN: CPT | Performed by: STUDENT IN AN ORGANIZED HEALTH CARE EDUCATION/TRAINING PROGRAM

## 2024-03-19 ASSESSMENT — FIBROSIS 4 INDEX: FIB4 SCORE: 1.23

## 2024-05-08 NOTE — PROGRESS NOTES
Established Patient    Gena presents today with the following:    CC: Pap smear and lab review    HPI:  Ms. Dykes is a 60 y/o F with a h/o paranoid schizophrenia (lives in boarding home), DCIS of the left breast, fungal dermatitis is here for pap smear and review of labs.  She is accompanied by her caregiver.  During pap smear, she was found to have visible beefy red rectal mass.  She states that she noticed it about 1 month ago.  She states that it sometimes causes mild pain.  She has a history of constipation which she takes Miralax.  She now has regular bowel movements.  She denies fatigue.  She has no significant weight loss since 1 yr.       Fungal dermatitis- Mild pruritis.  Mild improvement on Ketoconazole.  She had same rash in the past which resolved with long course of Ketoconazole.          Patient Active Problem List    Diagnosis Date Noted   • Rash, skin 06/17/2016   • Paranoid schizophrenia (CMS-HCC) 04/29/2016   • Carcinoma in situ of breast 04/29/2016   • Hypercholesterolemia 04/29/2016   • Loss of weight 04/29/2016       Current Outpatient Prescriptions   Medication Sig Dispense Refill   • Calcium Carb-Cholecalciferol (CALCIUM 600 + D PO) Take 1 Tab by mouth 2 Times a Day.     • ketoconazole (NIZORAL) 2 % Cream Apply  to affected area(s) every day. 1 Tube 2   • Asenapine Maleate (SAPHRIS) 10 MG SL Tab Place 10 mg under tongue 2 times a day.     • Polyethylene Glycol 3350 (MIRALAX PO) Take  by mouth.     • clozapine (CLOZARIL) 100 MG TABS Take 100 mg by mouth 2 times a day. Indications: two tabs every morning and 4 tabs every bedtime     • docusate sodium (COLACE) 100 MG CAPS Take 240 mg by mouth 2 times a day.     • docosahexanoic acid (OMEGA 3 FA) 1000 MG CAPS Take 1,560 mg by mouth every day.     • ascorbic acid (ASCORBIC ACID) 500 MG TABS Take 500 mg by mouth every day.     • therapeutic multivitamin-minerals (THERAGRAN-M) TABS Take 1 Tab by mouth every day.     • vitamin D  Pt called to get her appt for 5/8 at 10am switched to a virtual visit due to patient .  Writer switched appt.   "(CHOLECALCIFEROL) 1000 UNIT TABS Take 1,000 Units by mouth every day.       No current facility-administered medications for this visit.       ROS: As per HPI. Additional pertinent symptoms as noted below.      Review of Systems   Constitutional: Negative for fever, chills, weight loss and malaise/fatigue.   HENT: Negative for congestion.    Eyes: Negative.    Respiratory: Negative for cough and shortness of breath.    Cardiovascular: Negative.    Gastrointestinal: Negative for abdominal pain, diarrhea, constipation and blood in stool.        Mild rectal pain.     Genitourinary: Negative.    Musculoskeletal: Negative.    Skin: Positive for itching and rash.   Neurological: Negative.  Negative for headaches.   Endo/Heme/Allergies: Negative.    Psychiatric/Behavioral: Negative.        /70 mmHg  Pulse 97  Temp(Src) 36.5 °C (97.7 °F)  Ht 1.549 m (5' 0.98\")  Wt 53.638 kg (118 lb 4 oz)  BMI 22.35 kg/m2  SpO2 95%    Physical Exam   Constitutional:  oriented to person, place, and time. No distress.   Eyes: Pupils are equal, round, and reactive to light. No scleral icterus.  Neck: Neck supple. No thyromegaly present.   Cardiovascular: Normal rate, regular rhythm and normal heart sounds.  Exam reveals no gallop and no friction rub.  No murmur heard.  Pulmonary/Chest: Breath sounds normal. Chest wall is not dull to percussion.   Musculoskeletal:   no edema.   Abdomen:  Soft, mild distension. BS normactive. No guarding.    Rectal:  Large beefy red rectal mass on anal canal. Can't visualize anal canal.  No discharge.     :  External vagina: no rash or vesicles.   No vaginal discharge on examination.   Pap smear is done and sent for lab.   Neurological: alert and oriented to person, place, and time.   Skin: No cyanosis. Nails show no clubbing.  Left thigh: erythematous, scaly rash       Assessment and Plan    1. Rectal mass  - Possible rectal tear vs. Rectal prolapse vs. Malignancy  - Will refer to GI stat to " evaluate for further evaluation and biopsy to r/o Malignancy  - REFERRAL TO GASTROENTEROLOGY    2. Dermatitis fungal  - Mild improvement  - Continue Ketoconazole  - Will re-evaluate in 5 wks. If no improvement, will refer to Dermatologist    3. Healthcare maintenance  - Pap smear: done   - THINPREP PAP WITH HPV; Future  - CTM      Followup: No Follow-up on file.      Signed by: Mitch Honeycutt M.D.

## 2024-05-29 ENCOUNTER — NON-PROVIDER VISIT (OUTPATIENT)
Dept: OCCUPATIONAL MEDICINE | Facility: CLINIC | Age: 69
End: 2024-05-29

## 2024-05-29 DIAGNOSIS — Z11.1 ENCOUNTER FOR PPD TEST: Primary | ICD-10-CM

## 2024-05-31 ENCOUNTER — NON-PROVIDER VISIT (OUTPATIENT)
Dept: OCCUPATIONAL MEDICINE | Facility: CLINIC | Age: 69
End: 2024-05-31

## 2024-05-31 LAB — TB WHEAL 3D P 5 TU DIAM: NORMAL MM

## 2024-06-03 DIAGNOSIS — I21.9 MYOCARDIAL INFARCTION WITH NONOBSTRUCTIVE CORONARY ARTERIES (HCC): ICD-10-CM

## 2024-06-04 RX ORDER — VERAPAMIL HYDROCHLORIDE 40 MG/1
40 TABLET ORAL 2 TIMES DAILY
Qty: 180 TABLET | Refills: 3 | Status: SHIPPED | OUTPATIENT
Start: 2024-06-04

## 2024-06-04 NOTE — TELEPHONE ENCOUNTER
Is the patient due for a refill? Yes    Was the patient seen the past year? Yes    Date of last office visit: 03/19/2024    Does the patient have an upcoming appointment?  Yes   If yes, When? 09/19/2024    Provider to refill:[facility]    Does the patients insurance require a 100 day supply?  No

## 2024-09-18 ASSESSMENT — ENCOUNTER SYMPTOMS
DYSPNEA ON EXERTION: 0
SHORTNESS OF BREATH: 0
DIZZINESS: 0
SYNCOPE: 0
DIARRHEA: 0
NAUSEA: 0
NEAR-SYNCOPE: 0
VOMITING: 0
FOCAL WEAKNESS: 0
WHEEZING: 0
ABDOMINAL PAIN: 0
WEAKNESS: 0
FEVER: 0
PALPITATIONS: 0
ORTHOPNEA: 0
COUGH: 0
IRREGULAR HEARTBEAT: 0
NIGHT SWEATS: 0
PND: 0

## 2024-09-19 ENCOUNTER — OFFICE VISIT (OUTPATIENT)
Dept: CARDIOLOGY | Facility: MEDICAL CENTER | Age: 69
End: 2024-09-19
Attending: STUDENT IN AN ORGANIZED HEALTH CARE EDUCATION/TRAINING PROGRAM
Payer: MEDICARE

## 2024-09-19 VITALS
WEIGHT: 149.6 LBS | OXYGEN SATURATION: 95 % | RESPIRATION RATE: 16 BRPM | SYSTOLIC BLOOD PRESSURE: 106 MMHG | BODY MASS INDEX: 26.51 KG/M2 | DIASTOLIC BLOOD PRESSURE: 60 MMHG | HEART RATE: 84 BPM | HEIGHT: 63 IN

## 2024-09-19 DIAGNOSIS — E78.5 DYSLIPIDEMIA: ICD-10-CM

## 2024-09-19 DIAGNOSIS — I20.1 VASOSPASTIC ANGINA (HCC): ICD-10-CM

## 2024-09-19 DIAGNOSIS — I20.89 MICROVASCULAR ANGINA (HCC): ICD-10-CM

## 2024-09-19 DIAGNOSIS — I25.10 NONOBSTRUCTIVE ATHEROSCLEROSIS OF CORONARY ARTERY: ICD-10-CM

## 2024-09-19 PROCEDURE — 3078F DIAST BP <80 MM HG: CPT | Performed by: STUDENT IN AN ORGANIZED HEALTH CARE EDUCATION/TRAINING PROGRAM

## 2024-09-19 PROCEDURE — 99214 OFFICE O/P EST MOD 30 MIN: CPT | Performed by: STUDENT IN AN ORGANIZED HEALTH CARE EDUCATION/TRAINING PROGRAM

## 2024-09-19 PROCEDURE — 99212 OFFICE O/P EST SF 10 MIN: CPT | Performed by: STUDENT IN AN ORGANIZED HEALTH CARE EDUCATION/TRAINING PROGRAM

## 2024-09-19 PROCEDURE — 3074F SYST BP LT 130 MM HG: CPT | Performed by: STUDENT IN AN ORGANIZED HEALTH CARE EDUCATION/TRAINING PROGRAM

## 2024-09-19 ASSESSMENT — FIBROSIS 4 INDEX: FIB4 SCORE: 1.23

## 2024-09-19 NOTE — PROGRESS NOTES
Cardiology Follow-up Consultation Note    Date of note:    09/19/24  Primary Care Provider: Adam Soto M.D.    Patient Name: Gena Dykes   YOB: 1955  MRN:              [id number]    Chief Complaint: Follow-up microvascular disease    History of Present Illness: Ms. Gena Dykes is a 68 y.o. female whose current medical problems include microvascular disease, dyslipidemia, history of breast cancer who is here for follow-up microvascular disease.    The patient was last seen in my clinic on 3/19/2024   (her initial visit with me, previously a patient of Dr. Jacob).  Vasospasm was diagnosed from left heart cath per her prior cardiologist note.  She was doing well during the last visit, and no changes were made to her medications.    The patient presents today for follow-up. The patient presents with her brother Greg (she typically comes with her caregiver, Radha, who is busy today). The patient reports feeling well today. She denies any chest pain or shortness of breath. She walks daily without any problem. She denies any orthponea, PND, or leg swelling. No palpitations. No syncope or presyncopal episodes.     Cardiovascular Risk Factors:  1. Smoking status: Former smoker  2. Type II Diabetes Mellitus: Prediabetes, diet controlled  Lab Results   Component Value Date/Time    HBA1C 5.8 (H) 02/24/2022 07:46 AM    HBA1C 5.9 (H) 02/24/2022 12:52 AM     3. Hypertension: None  4. Dyslipidemia: On statin  Lab Results   Component Value Date/Time    CHOLSTRLTOT 134 04/04/2024 0639    TRIGLYCERIDE 56 04/04/2024 0639    HDL 69 04/04/2024 0639    LDL 98 08/18/2022 2345    LDLCALC 53 04/04/2024 0639   5. Family history of early Coronary Artery Disease in a first degree relative (Male less than 55 years of age; Female less than 65 years of age): Mother with heart disease  6.  Obesity and/or Metabolic Syndrome: Body mass index is 26.5 kg/m².  7. Sedentary lifestyle: Active, walks daily    Review of  "Systems   Constitutional: Negative for fever, malaise/fatigue and night sweats.   Cardiovascular:  Negative for chest pain, dyspnea on exertion, irregular heartbeat, leg swelling, near-syncope, orthopnea, palpitations, paroxysmal nocturnal dyspnea and syncope.   Respiratory:  Negative for cough, shortness of breath and wheezing.    Gastrointestinal:  Negative for abdominal pain, diarrhea, nausea and vomiting.   Neurological:  Negative for dizziness, focal weakness and weakness.         All other systems reviewed and are negative.       Current Outpatient Medications   Medication Sig Dispense Refill    verapamil (ISOPTIN) 40 MG Tab TAKE 1 TABLET BY MOUTH TWICE DAILY 180 Tablet 3    CALCIUM + VITAMIN D3 600-5 MG-MCG Tab TAKE 1 TABLET BY MOUTH ONCE DAILY 30 Tablet 11    atorvastatin (LIPITOR) 40 MG Tab TAKE 1 TABLET BY MOUTH EVERY EVENING. 90 Tablet 1    docusate calcium (SURFAK) 240 MG Cap TAKE 1 CAPSULE BY MOUTH ONCE DAILY 30 Capsule 11    Omega-3 Fatty Acids (FISH OIL) 1000 MG Cap capsule Take 1,000 mg by mouth 2 times a day.      Multiple Vitamin (MULTIVITAMIN) Tab TAKE 1 TABLET BY MOUTH ONCE DAILY 30 Tablet 11    Asenapine Maleate 10 MG SL Tab Place 1 Tablet under the tongue 2 times a day.      polyethylene glycol/lytes (MIRALAX) Pack Take 17 g by mouth every bedtime.      clozapine (CLOZARIL) 100 MG TABS Take 100-300 mg by mouth in the morning, at noon, and at bedtime. Take 200 mg in am, 100 mg in the afternoon, 300 mg at night       No current facility-administered medications for this visit.         Allergies   Allergen Reactions    Azithromycin     Fluphenazine      hallucinations    Haldol [Haloperidol]     Erythromycin      Pt on Clozapine/no erythromycin    Haldol [Haloperidol Lactate]      Does not tolerate       Physical Exam:  Ambulatory Vitals  /60 (BP Location: Left arm, Patient Position: Sitting, BP Cuff Size: Adult)   Pulse 84   Resp 16   Ht 1.6 m (5' 3\")   Wt 67.9 kg (149 lb 9.6 oz)   " SpO2 95%    Oxygen Therapy:  Pulse Oximetry: 95 %  BP Readings from Last 4 Encounters:   09/19/24 106/60   03/19/24 100/60   12/26/23 118/74   10/05/23 (!) 146/98       Weight/BMI: Body mass index is 26.5 kg/m².  Wt Readings from Last 4 Encounters:   09/19/24 67.9 kg (149 lb 9.6 oz)   03/19/24 65.8 kg (145 lb)   12/26/23 67.5 kg (148 lb 14.4 oz)   10/05/23 68 kg (150 lb)         General: Well appearing and in no apparent distress  Eyes: nl conjunctiva, no icteric sclera  ENT: normal external appearance of ears, nose, and throat  Neck: no visible JVP,  no carotid bruits  Lungs: normal respiratory effort, CTAB  Heart: RRR, no murmurs, no rubs or gallops,  no edema bilateral lower extremities. No LV/RV heave on cardiac palpatation. + bilateral radial pulses.  + bilateral dp pulses.   Abdomen: soft, non tender, non distended, no masses, normal bowel sounds.  No HSM.  Extremities/MSK: no clubbing, no cyanosis  Neurological: No focal sensory deficits  Psychiatric: Appropriate affect, A/O x 3, intact judgement and insight  Skin: Warm extremities      Lab Data Review:  Lab Results   Component Value Date/Time    CHOLSTRLTOT 134 04/04/2024 06:39 AM    CHOLSTRLTOT 159 08/18/2022 11:45 PM    LDL 98 08/18/2022 11:45 PM    HDL 69 04/04/2024 06:39 AM    HDL 49 08/18/2022 11:45 PM    TRIGLYCERIDE 56 04/04/2024 06:39 AM    TRIGLYCERIDE 59 08/18/2022 11:45 PM       Lab Results   Component Value Date/Time    SODIUM 137 01/05/2024 05:39 AM    SODIUM 132 (L) 10/05/2023 01:58 PM    POTASSIUM 4.5 01/05/2024 05:39 AM    POTASSIUM 4.2 10/05/2023 01:58 PM    CHLORIDE 100 01/05/2024 05:39 AM    CHLORIDE 98 10/05/2023 01:58 PM    CO2 23 01/05/2024 05:39 AM    CO2 27 10/05/2023 01:58 PM    GLUCOSE 93 01/05/2024 05:39 AM    GLUCOSE 97 10/05/2023 01:58 PM    BUN 13 01/05/2024 05:39 AM    BUN 14 10/05/2023 01:58 PM    CREATININE 0.91 01/05/2024 05:39 AM    CREATININE 0.82 10/05/2023 01:58 PM    BUNCREATRAT 14 01/05/2024 05:39 AM     Lab Results    Component Value Date/Time    ALKPHOSPHAT 127 (H) 01/05/2024 05:39 AM    ALKPHOSPHAT 91 10/26/2022 12:58 PM    ASTSGOT 25 01/05/2024 05:39 AM    ASTSGOT 30 10/26/2022 12:58 PM    ALTSGPT 14 01/05/2024 05:39 AM    ALTSGPT 17 10/26/2022 12:58 PM    TBILIRUBIN 0.3 01/05/2024 05:39 AM    TBILIRUBIN 0.3 10/26/2022 12:58 PM      Lab Results   Component Value Date/Time    WBC 11.7 (H) 01/05/2024 05:39 AM    WBC 9.6 10/05/2023 01:58 PM    HEMOGLOBIN 14.2 01/05/2024 05:39 AM    HEMOGLOBIN 14.2 10/05/2023 01:58 PM     Lab Results   Component Value Date/Time    HBA1C 5.8 (H) 02/24/2022 07:46 AM    HBA1C 5.9 (H) 02/24/2022 12:52 AM         Cardiac Imaging and Procedures Review:    EKG dated 8/18/2022: My personal interpretation is sinus rhythm with inferior ST elevation     EKG dated 10/26/2022: My personal interpretation is sinus rhythm without inferior ST elevation     Echo dated 8/19/2022:   CONCLUSIONS  Normal left ventricular size, thickness, systolic function, and   diastolic function.  Normal regional wall motion.    [facility] (8/18/2022):   Findings:  1.  Left main coronary artery:  Normal.  2.  Left anterior descending artery:  Luminal irregularities, diagonals are small.  3.  Left circumflex coronary artery:  Luminal irregularities. Gives two marginal branches, which have no significant disease   4.  Right coronary artery:  30-40% stenosis in the proximal right coronary artery disease. This is a right dominant system.  5.  Left ventricular end diastolic pressure:  29 mmHg.  No signficant gradient across the aortic valve.  6.  Left ventriculogram:  Ejection fraction of 60%.      Assessment & Plan     1. Nonobstructive atherosclerosis of coronary artery        2. Microvascular angina (HCC)        3. Vasospastic angina (HCC)        4. Dyslipidemia                Shared Medical Decision Making:    Microvascular angina  Nonobstructive CAD  Vasospasm  Dyslipidemia  Walks daily without any symptoms.  Symptoms appear to be  well-controlled on current regimen.  -Continue verapamil 40mg twice daily  -Continue atorvastatin 40mg daily  -Most recent lipid panel within normal limits    All of the patient's excellent questions were answered to the best of my knowledge and to her satisfaction.  It was a pleasure seeing Ms. Gena Dykes in my clinic today. Return in about 1 year (around 9/19/2025), or if symptoms worsen or fail to improve. Patient is aware to call the cardiology clinic with any questions or concerns.      Magdi Rodriguez MD  [facility]  [place] [facility], [address]  [address] [address]  Phone: [phone]  Fax: [phone]

## 2024-10-02 DIAGNOSIS — E78.5 DYSLIPIDEMIA: ICD-10-CM

## 2024-10-02 DIAGNOSIS — I21.9 MYOCARDIAL INFARCTION WITH NONOBSTRUCTIVE CORONARY ARTERIES (HCC): ICD-10-CM

## 2024-10-02 RX ORDER — ATORVASTATIN CALCIUM 40 MG/1
40 TABLET, FILM COATED ORAL EVERY EVENING
Qty: 90 TABLET | Refills: 3 | Status: SHIPPED | OUTPATIENT
Start: 2024-10-02

## 2024-12-19 ENCOUNTER — APPOINTMENT (OUTPATIENT)
Dept: RADIOLOGY | Facility: MEDICAL CENTER | Age: 69
DRG: 175 | End: 2024-12-19
Attending: STUDENT IN AN ORGANIZED HEALTH CARE EDUCATION/TRAINING PROGRAM
Payer: MEDICARE

## 2024-12-19 ENCOUNTER — HOSPITAL ENCOUNTER (INPATIENT)
Facility: MEDICAL CENTER | Age: 69
LOS: 2 days | DRG: 175 | End: 2024-12-21
Attending: STUDENT IN AN ORGANIZED HEALTH CARE EDUCATION/TRAINING PROGRAM | Admitting: STUDENT IN AN ORGANIZED HEALTH CARE EDUCATION/TRAINING PROGRAM
Payer: MEDICARE

## 2024-12-19 DIAGNOSIS — J18.9 PNEUMONIA OF RIGHT LOWER LOBE DUE TO INFECTIOUS ORGANISM: ICD-10-CM

## 2024-12-19 DIAGNOSIS — I26.94 MULTIPLE SUBSEGMENTAL PULMONARY EMBOLI WITHOUT ACUTE COR PULMONALE (HCC): ICD-10-CM

## 2024-12-19 DIAGNOSIS — D72.829 LEUKOCYTOSIS, UNSPECIFIED TYPE: ICD-10-CM

## 2024-12-19 DIAGNOSIS — I26.99 PULMONARY INFARCT (HCC): ICD-10-CM

## 2024-12-19 PROBLEM — R73.03 PREDIABETES: Status: ACTIVE | Noted: 2024-12-19

## 2024-12-19 LAB
ALBUMIN SERPL BCP-MCNC: 3.4 G/DL (ref 3.2–4.9)
ALBUMIN/GLOB SERPL: 0.9 G/DL
ALP SERPL-CCNC: 99 U/L (ref 30–99)
ALT SERPL-CCNC: 7 U/L (ref 2–50)
ANION GAP SERPL CALC-SCNC: 13 MMOL/L (ref 7–16)
ANISOCYTOSIS BLD QL SMEAR: ABNORMAL
APPEARANCE UR: CLEAR
AST SERPL-CCNC: 21 U/L (ref 12–45)
BACTERIA #/AREA URNS HPF: NORMAL /HPF
BASOPHILS # BLD AUTO: 0 % (ref 0–1.8)
BASOPHILS # BLD: 0 K/UL (ref 0–0.12)
BILIRUB SERPL-MCNC: 0.5 MG/DL (ref 0.1–1.5)
BILIRUB UR QL STRIP.AUTO: NEGATIVE
BUN SERPL-MCNC: 14 MG/DL (ref 8–22)
CALCIUM ALBUM COR SERPL-MCNC: 9.6 MG/DL (ref 8.5–10.5)
CALCIUM SERPL-MCNC: 9.1 MG/DL (ref 8.5–10.5)
CASTS URNS QL MICRO: NORMAL /LPF (ref 0–2)
CHLORIDE SERPL-SCNC: 101 MMOL/L (ref 96–112)
CO2 SERPL-SCNC: 21 MMOL/L (ref 20–33)
COLOR UR: ABNORMAL
CREAT SERPL-MCNC: 0.89 MG/DL (ref 0.5–1.4)
D DIMER PPP IA.FEU-MCNC: 1.13 UG/ML (FEU) (ref 0–0.5)
EKG IMPRESSION: NORMAL
EOSINOPHIL # BLD AUTO: 0 K/UL (ref 0–0.51)
EOSINOPHIL NFR BLD: 0 % (ref 0–6.9)
EPITHELIAL CELLS 1715: NORMAL /HPF (ref 0–5)
ERYTHROCYTE [DISTWIDTH] IN BLOOD BY AUTOMATED COUNT: 52.1 FL (ref 35.9–50)
FLUAV RNA SPEC QL NAA+PROBE: NEGATIVE
FLUBV RNA SPEC QL NAA+PROBE: NEGATIVE
GFR SERPLBLD CREATININE-BSD FMLA CKD-EPI: 71 ML/MIN/1.73 M 2
GLOBULIN SER CALC-MCNC: 3.6 G/DL (ref 1.9–3.5)
GLUCOSE SERPL-MCNC: 166 MG/DL (ref 65–99)
GLUCOSE UR STRIP.AUTO-MCNC: NEGATIVE MG/DL
HCT VFR BLD AUTO: 44.2 % (ref 37–47)
HGB BLD-MCNC: 14.9 G/DL (ref 12–16)
KETONES UR STRIP.AUTO-MCNC: ABNORMAL MG/DL
LEUKOCYTE ESTERASE UR QL STRIP.AUTO: ABNORMAL
LYMPHOCYTES # BLD AUTO: 0 K/UL (ref 1–4.8)
LYMPHOCYTES NFR BLD: 0 % (ref 22–41)
MANUAL DIFF BLD: NORMAL
MCH RBC QN AUTO: 29.7 PG (ref 27–33)
MCHC RBC AUTO-ENTMCNC: 33.7 G/DL (ref 32.2–35.5)
MCV RBC AUTO: 88.2 FL (ref 81.4–97.8)
METAMYELOCYTES NFR BLD MANUAL: 1.7 %
MICRO URNS: ABNORMAL
MONOCYTES # BLD AUTO: 0.73 K/UL (ref 0–0.85)
MONOCYTES NFR BLD AUTO: 1.7 % (ref 0–13.4)
MORPHOLOGY BLD-IMP: NORMAL
NEUTROPHILS # BLD AUTO: 41.25 K/UL (ref 1.82–7.42)
NEUTROPHILS NFR BLD: 92.4 % (ref 44–72)
NEUTS BAND NFR BLD MANUAL: 4.2 % (ref 0–10)
NITRITE UR QL STRIP.AUTO: NEGATIVE
NRBC # BLD AUTO: 0 K/UL
NRBC BLD-RTO: 0 /100 WBC (ref 0–0.2)
NT-PROBNP SERPL IA-MCNC: 1455 PG/ML (ref 0–125)
PH UR STRIP.AUTO: 5.5 [PH] (ref 5–8)
PLATELET # BLD AUTO: 328 K/UL (ref 164–446)
PLATELET BLD QL SMEAR: NORMAL
PMV BLD AUTO: 9.9 FL (ref 9–12.9)
POTASSIUM SERPL-SCNC: 3.9 MMOL/L (ref 3.6–5.5)
PROT SERPL-MCNC: 7 G/DL (ref 6–8.2)
PROT UR QL STRIP: 30 MG/DL
RBC # BLD AUTO: 5.01 M/UL (ref 4.2–5.4)
RBC # URNS HPF: NORMAL /HPF (ref 0–2)
RBC BLD AUTO: PRESENT
RBC UR QL AUTO: NEGATIVE
RSV RNA SPEC QL NAA+PROBE: NEGATIVE
SARS-COV-2 RNA RESP QL NAA+PROBE: NOTDETECTED
SODIUM SERPL-SCNC: 135 MMOL/L (ref 135–145)
SP GR UR STRIP.AUTO: 1.02
TOXIC GRANULES BLD QL SMEAR: NORMAL
TROPONIN T SERPL-MCNC: 31 NG/L (ref 6–19)
TROPONIN T SERPL-MCNC: 35 NG/L (ref 6–19)
TROPONIN T SERPL-MCNC: 39 NG/L (ref 6–19)
TROPONIN T SERPL-MCNC: 44 NG/L (ref 6–19)
TROPONIN T SERPL-MCNC: 48 NG/L (ref 6–19)
UROBILINOGEN UR STRIP.AUTO-MCNC: 1 EU/DL
WBC # BLD AUTO: 42.7 K/UL (ref 4.8–10.8)
WBC #/AREA URNS HPF: NORMAL /HPF

## 2024-12-19 PROCEDURE — 700111 HCHG RX REV CODE 636 W/ 250 OVERRIDE (IP)

## 2024-12-19 PROCEDURE — 99223 1ST HOSP IP/OBS HIGH 75: CPT | Mod: AI,GC | Performed by: STUDENT IN AN ORGANIZED HEALTH CARE EDUCATION/TRAINING PROGRAM

## 2024-12-19 PROCEDURE — 96374 THER/PROPH/DIAG INJ IV PUSH: CPT

## 2024-12-19 PROCEDURE — 36415 COLL VENOUS BLD VENIPUNCTURE: CPT

## 2024-12-19 PROCEDURE — 700102 HCHG RX REV CODE 250 W/ 637 OVERRIDE(OP)

## 2024-12-19 PROCEDURE — A9270 NON-COVERED ITEM OR SERVICE: HCPCS

## 2024-12-19 PROCEDURE — 85379 FIBRIN DEGRADATION QUANT: CPT

## 2024-12-19 PROCEDURE — 85027 COMPLETE CBC AUTOMATED: CPT

## 2024-12-19 PROCEDURE — 71275 CT ANGIOGRAPHY CHEST: CPT

## 2024-12-19 PROCEDURE — 700117 HCHG RX CONTRAST REV CODE 255: Performed by: STUDENT IN AN ORGANIZED HEALTH CARE EDUCATION/TRAINING PROGRAM

## 2024-12-19 PROCEDURE — 84484 ASSAY OF TROPONIN QUANT: CPT

## 2024-12-19 PROCEDURE — 99285 EMERGENCY DEPT VISIT HI MDM: CPT

## 2024-12-19 PROCEDURE — 96372 THER/PROPH/DIAG INJ SC/IM: CPT

## 2024-12-19 PROCEDURE — 71045 X-RAY EXAM CHEST 1 VIEW: CPT

## 2024-12-19 PROCEDURE — 770020 HCHG ROOM/CARE - TELE (206)

## 2024-12-19 PROCEDURE — 80053 COMPREHEN METABOLIC PANEL: CPT

## 2024-12-19 PROCEDURE — 85007 BL SMEAR W/DIFF WBC COUNT: CPT

## 2024-12-19 PROCEDURE — 700111 HCHG RX REV CODE 636 W/ 250 OVERRIDE (IP): Performed by: STUDENT IN AN ORGANIZED HEALTH CARE EDUCATION/TRAINING PROGRAM

## 2024-12-19 PROCEDURE — 93005 ELECTROCARDIOGRAM TRACING: CPT | Mod: TC | Performed by: STUDENT IN AN ORGANIZED HEALTH CARE EDUCATION/TRAINING PROGRAM

## 2024-12-19 PROCEDURE — 0241U HCHG SARS-COV-2 COVID-19 NFCT DS RESP RNA 4 TRGT ED POC: CPT

## 2024-12-19 PROCEDURE — 83880 ASSAY OF NATRIURETIC PEPTIDE: CPT

## 2024-12-19 PROCEDURE — 81001 URINALYSIS AUTO W/SCOPE: CPT

## 2024-12-19 RX ORDER — ENOXAPARIN SODIUM 100 MG/ML
1 INJECTION SUBCUTANEOUS ONCE
Status: COMPLETED | OUTPATIENT
Start: 2024-12-19 | End: 2024-12-19

## 2024-12-19 RX ORDER — ENOXAPARIN SODIUM 100 MG/ML
60 INJECTION SUBCUTANEOUS EVERY 12 HOURS
Status: DISCONTINUED | OUTPATIENT
Start: 2024-12-19 | End: 2024-12-19

## 2024-12-19 RX ORDER — CLOZAPINE 100 MG/1
300 TABLET ORAL EVERY EVENING
Status: DISCONTINUED | OUTPATIENT
Start: 2024-12-19 | End: 2024-12-21 | Stop reason: HOSPADM

## 2024-12-19 RX ORDER — POLYETHYLENE GLYCOL 3350 17 G/17G
1 POWDER, FOR SOLUTION ORAL
Status: DISCONTINUED | OUTPATIENT
Start: 2024-12-19 | End: 2024-12-20

## 2024-12-19 RX ORDER — CLOZAPINE 100 MG/1
100 TABLET ORAL
Status: DISCONTINUED | OUTPATIENT
Start: 2024-12-19 | End: 2024-12-21 | Stop reason: HOSPADM

## 2024-12-19 RX ORDER — CLOZAPINE 100 MG/1
200 TABLET ORAL EVERY MORNING
Status: DISCONTINUED | OUTPATIENT
Start: 2024-12-19 | End: 2024-12-21 | Stop reason: HOSPADM

## 2024-12-19 RX ORDER — CLOZAPINE 100 MG/1
100-300 TABLET ORAL 3 TIMES DAILY
Status: DISCONTINUED | OUTPATIENT
Start: 2024-12-19 | End: 2024-12-19

## 2024-12-19 RX ORDER — AMOXICILLIN 250 MG
2 CAPSULE ORAL EVERY EVENING
Status: DISCONTINUED | OUTPATIENT
Start: 2024-12-19 | End: 2024-12-21 | Stop reason: HOSPADM

## 2024-12-19 RX ORDER — ATORVASTATIN CALCIUM 40 MG/1
40 TABLET, FILM COATED ORAL EVERY EVENING
Status: DISCONTINUED | OUTPATIENT
Start: 2024-12-19 | End: 2024-12-21 | Stop reason: HOSPADM

## 2024-12-19 RX ORDER — ENOXAPARIN SODIUM 100 MG/ML
1 INJECTION SUBCUTANEOUS EVERY 12 HOURS
Status: DISCONTINUED | OUTPATIENT
Start: 2024-12-19 | End: 2024-12-20

## 2024-12-19 RX ORDER — CEFTRIAXONE 2 G/1
2000 INJECTION, POWDER, FOR SOLUTION INTRAMUSCULAR; INTRAVENOUS ONCE
Status: COMPLETED | OUTPATIENT
Start: 2024-12-19 | End: 2024-12-19

## 2024-12-19 RX ORDER — DOXYCYCLINE 100 MG/1
100 TABLET ORAL EVERY 12 HOURS
Status: DISCONTINUED | OUTPATIENT
Start: 2024-12-19 | End: 2024-12-21 | Stop reason: HOSPADM

## 2024-12-19 RX ORDER — VERAPAMIL HYDROCHLORIDE 80 MG/1
40 TABLET ORAL 2 TIMES DAILY
Status: DISCONTINUED | OUTPATIENT
Start: 2024-12-19 | End: 2024-12-21 | Stop reason: HOSPADM

## 2024-12-19 RX ORDER — ASENAPINE 10 MG/1
1 TABLET SUBLINGUAL 2 TIMES DAILY
Status: DISCONTINUED | OUTPATIENT
Start: 2024-12-19 | End: 2024-12-19

## 2024-12-19 RX ADMIN — CLOZAPINE 100 MG: 100 TABLET ORAL at 11:43

## 2024-12-19 RX ADMIN — ATORVASTATIN CALCIUM 40 MG: 40 TABLET, FILM COATED ORAL at 17:13

## 2024-12-19 RX ADMIN — CLOZAPINE 300 MG: 100 TABLET ORAL at 17:51

## 2024-12-19 RX ADMIN — DOXYCYCLINE 100 MG: 100 TABLET, FILM COATED ORAL at 11:43

## 2024-12-19 RX ADMIN — CLOZAPINE 200 MG: 100 TABLET ORAL at 07:11

## 2024-12-19 RX ADMIN — ENOXAPARIN SODIUM 60 MG: 100 INJECTION SUBCUTANEOUS at 17:14

## 2024-12-19 RX ADMIN — ENOXAPARIN SODIUM 60 MG: 100 INJECTION SUBCUTANEOUS at 05:06

## 2024-12-19 RX ADMIN — DOXYCYCLINE 100 MG: 100 TABLET, FILM COATED ORAL at 17:13

## 2024-12-19 RX ADMIN — IOHEXOL 45 ML: 350 INJECTION, SOLUTION INTRAVENOUS at 03:40

## 2024-12-19 RX ADMIN — SENNOSIDES AND DOCUSATE SODIUM 2 TABLET: 50; 8.6 TABLET ORAL at 17:13

## 2024-12-19 RX ADMIN — CEFTRIAXONE SODIUM 2000 MG: 2 INJECTION, POWDER, FOR SOLUTION INTRAMUSCULAR; INTRAVENOUS at 02:57

## 2024-12-19 SDOH — ECONOMIC STABILITY: TRANSPORTATION INSECURITY
IN THE PAST 12 MONTHS, HAS LACK OF RELIABLE TRANSPORTATION KEPT YOU FROM MEDICAL APPOINTMENTS, MEETINGS, WORK OR FROM GETTING THINGS NEEDED FOR DAILY LIVING?: NO

## 2024-12-19 SDOH — ECONOMIC STABILITY: TRANSPORTATION INSECURITY
IN THE PAST 12 MONTHS, HAS THE LACK OF TRANSPORTATION KEPT YOU FROM MEDICAL APPOINTMENTS OR FROM GETTING MEDICATIONS?: NO

## 2024-12-19 ASSESSMENT — COGNITIVE AND FUNCTIONAL STATUS - GENERAL
SUGGESTED CMS G CODE MODIFIER DAILY ACTIVITY: CI
MOBILITY SCORE: 19
DRESSING REGULAR LOWER BODY CLOTHING: A LITTLE
SUGGESTED CMS G CODE MODIFIER MOBILITY: CK
DAILY ACTIVITIY SCORE: 23
MOVING FROM LYING ON BACK TO SITTING ON SIDE OF FLAT BED: A LOT
CLIMB 3 TO 5 STEPS WITH RAILING: A LOT
WALKING IN HOSPITAL ROOM: A LITTLE

## 2024-12-19 ASSESSMENT — SOCIAL DETERMINANTS OF HEALTH (SDOH)

## 2024-12-19 ASSESSMENT — LIFESTYLE VARIABLES
TOTAL SCORE: 0
HAVE YOU EVER FELT YOU SHOULD CUT DOWN ON YOUR DRINKING: NO
ALCOHOL_USE: NO
ON A TYPICAL DAY WHEN YOU DRINK ALCOHOL HOW MANY DRINKS DO YOU HAVE: 0
EVER FELT BAD OR GUILTY ABOUT YOUR DRINKING: NO
HOW MANY TIMES IN THE PAST YEAR HAVE YOU HAD 5 OR MORE DRINKS IN A DAY: 0
TOTAL SCORE: 0
CONSUMPTION TOTAL: NEGATIVE
HAVE PEOPLE ANNOYED YOU BY CRITICIZING YOUR DRINKING: NO
AVERAGE NUMBER OF DAYS PER WEEK YOU HAVE A DRINK CONTAINING ALCOHOL: 0
EVER HAD A DRINK FIRST THING IN THE MORNING TO STEADY YOUR NERVES TO GET RID OF A HANGOVER: NO
TOTAL SCORE: 0

## 2024-12-19 ASSESSMENT — FIBROSIS 4 INDEX
FIB4 SCORE: 1.62
FIB4 SCORE: 1.23

## 2024-12-19 ASSESSMENT — PAIN DESCRIPTION - PAIN TYPE: TYPE: ACUTE PAIN

## 2024-12-19 NOTE — ED NOTES
Med Rec completed per patient     Allergies reviewed: yes    Antibiotics in the past 30 days: no    Anticoagulant in past 14 days: no    Pharmacy patient utilizes: [facility] for this visit

## 2024-12-19 NOTE — ED PROVIDER NOTES
"ED Provider Note    CHIEF COMPLAINT  Chief Complaint   Patient presents with    Flu Like Symptoms     Pt BIBA form home for flu like symptoms x4 days and increasing weakness. Pt reports cough, chills and \"creepy feeling\". Last night pt was walking up her stairs and became extremely weak so her caregiver lowered her to the ground. -LOC, -Headstrike, -thinners. Pt found to be saturating at 88% on RA, placed on 2L NC in the field to 95%. Pt A&Ox4, GCS 15.     Weakness       EXTERNAL RECORDS REVIEWED  Outpatient notes.  She follows with cardiology.  She has a history of nonobstructive atherosclerosis of the coronary artery, hyperlipidemia and dyslipidemia.    HPI/ROS  LIMITATION TO HISTORY   None  OUTSIDE HISTORIAN(S):  EMS found her with room air sats 88%.  She was placed on 2 L of oxygen by nasal cannula    Gena Dykes is a 68 y.o. female who presents for evaluation of generalized weakness and flulike symptoms.  She says that her symptoms started about 4 days ago.  She says that she feels hot and also has chills and feels generally unwell.  She has had an associated cough.  Last night she was walking up her stairs, felt weak and her caregiver had to lower her to the ground.  She is certain that she did not have hit her head or lose consciousness.  She denies any injuries from the fall.  Patient says that a family member at home does have a cough.  She denies any chest pain.  She does feel dehydrate but she has not had any vomiting or diarrhea.    PAST MEDICAL HISTORY   Denies    SURGICAL HISTORY   has a past surgical history that includes lumpectomy (Left) and mass excision general (2017).    FAMILY HISTORY  Family History   Problem Relation Age of Onset    Heart Disease Mother        SOCIAL HISTORY  Social History     Tobacco Use    Smoking status: Former     Current packs/day: 0.00     Types: Cigarettes     Quit date: 2011     Years since quittin.0    Smokeless tobacco: Never    Tobacco " "comments:     smoked for 45 years   Vaping Use    Vaping status: Never Used   Substance and Sexual Activity    Alcohol use: No     Alcohol/week: 0.0 oz    Drug use: No    Sexual activity: Not on file       CURRENT MEDICATIONS  Home Medications       Reviewed by Sandhya Ndiaye (Pharmacy Tech) on 12/19/24 at 0502  Med List Status: Complete     Medication Last Dose Status   Asenapine Maleate 10 MG SL Tab 12/18/2024 Active   atorvastatin (LIPITOR) 40 MG Tab 12/18/2024 Active   CALCIUM + VITAMIN D3 600-5 MG-MCG Tab 12/18/2024 Active   clozapine (CLOZARIL) 100 MG TABS 12/18/2024 Active   docusate calcium (SURFAK) 240 MG Cap 12/18/2024 Active   Multiple Vitamin (MULTIVITAMIN) Tab 12/18/2024 Active   Omega-3 Fatty Acids (FISH OIL) 1000 MG Cap capsule 12/18/2024 Active   polyethylene glycol/lytes (MIRALAX) Pack 12/18/2024 Active   verapamil (ISOPTIN) 40 MG Tab 12/18/2024 Active                  Audit from Redirected Encounters    **Home medications have not yet been reviewed for this encounter**         ALLERGIES  Allergies   Allergen Reactions    Azithromycin     Fluphenazine      hallucinations    Haldol [Haloperidol]     Erythromycin      Pt on Clozapine/no erythromycin    Haldol [Haloperidol Lactate]      Does not tolerate       PHYSICAL EXAM  VITAL SIGNS: /51   Pulse 86   Temp 37.9 °C (100.3 °F) (Temporal)   Resp 19   Ht 1.6 m (5' 3\")   Wt 65.8 kg (145 lb)   LMP 07/03/2000 (Approximate)   SpO2 94%   BMI 25.69 kg/m²    Constitutional: Awake and alert Ill appearing  HENT: Normal inspection  Dry mucous membranes  Eyes: Normal inspection  Neck: Grossly normal range of motion.  Cardiovascular: Normal heart rate, Normal rhythm.  Symmetric peripheral pulses.   Thorax & Lungs:  She is on 2L NC.  She has mildly increased work of breathing  Abdomen: Soft, non-distended, nontender to palpation in all 4 quadrants, no mass  Skin: No obvious rash.  Extremities: Warm, well perfused. No clubbing, cyanosis. No lower " edema   Neurologic: Grossly normal   Psychiatric: Normal for situation      EKG/LABS  Results for orders placed or performed during the hospital encounter of 12/19/24   CBC WITH DIFFERENTIAL    Collection Time: 12/19/24  1:35 AM   Result Value Ref Range    WBC 42.7 (H) 4.8 - 10.8 K/uL    RBC 5.01 4.20 - 5.40 M/uL    Hemoglobin 14.9 12.0 - 16.0 g/dL    Hematocrit 44.2 37.0 - 47.0 %    MCV 88.2 81.4 - 97.8 fL    MCH 29.7 27.0 - 33.0 pg    MCHC 33.7 32.2 - 35.5 g/dL    RDW 52.1 (H) 35.9 - 50.0 fL    Platelet Count 328 164 - 446 K/uL    MPV 9.9 9.0 - 12.9 fL    Neutrophils-Polys 92.40 (H) 44.00 - 72.00 %    Lymphocytes 0.00 (L) 22.00 - 41.00 %    Monocytes 1.70 0.00 - 13.40 %    Eosinophils 0.00 0.00 - 6.90 %    Basophils 0.00 0.00 - 1.80 %    Nucleated RBC 0.00 0.00 - 0.20 /100 WBC    Neutrophils (Absolute) 41.25 (H) 1.82 - 7.42 K/uL    Lymphs (Absolute) 0.00 (L) 1.00 - 4.80 K/uL    Monos (Absolute) 0.73 0.00 - 0.85 K/uL    Eos (Absolute) 0.00 0.00 - 0.51 K/uL    Baso (Absolute) 0.00 0.00 - 0.12 K/uL    NRBC (Absolute) 0.00 K/uL    Anisocytosis 1+    COMP METABOLIC PANEL    Collection Time: 12/19/24  1:35 AM   Result Value Ref Range    Sodium 135 135 - 145 mmol/L    Potassium 3.9 3.6 - 5.5 mmol/L    Chloride 101 96 - 112 mmol/L    Co2 21 20 - 33 mmol/L    Anion Gap 13.0 7.0 - 16.0    Glucose 166 (H) 65 - 99 mg/dL    Bun 14 8 - 22 mg/dL    Creatinine 0.89 0.50 - 1.40 mg/dL    Calcium 9.1 8.5 - 10.5 mg/dL    Correct Calcium 9.6 8.5 - 10.5 mg/dL    AST(SGOT) 21 12 - 45 U/L    ALT(SGPT) 7 2 - 50 U/L    Alkaline Phosphatase 99 30 - 99 U/L    Total Bilirubin 0.5 0.1 - 1.5 mg/dL    Albumin 3.4 3.2 - 4.9 g/dL    Total Protein 7.0 6.0 - 8.2 g/dL    Globulin 3.6 (H) 1.9 - 3.5 g/dL    A-G Ratio 0.9 g/dL   TROPONIN    Collection Time: 12/19/24  1:35 AM   Result Value Ref Range    Troponin T 31 (H) 6 - 19 ng/L   ESTIMATED GFR    Collection Time: 12/19/24  1:35 AM   Result Value Ref Range    GFR (CKD-EPI) 71 >60 mL/min/1.73 m 2    D-DIMER    Collection Time: 12/19/24  1:35 AM   Result Value Ref Range    D-Dimer 1.13 (H) 0.00 - 0.50 ug/mL (FEU)   DIFFERENTIAL MANUAL    Collection Time: 12/19/24  1:35 AM   Result Value Ref Range    Bands-Stabs 4.20 0.00 - 10.00 %    Metamyelocytes 1.70 %    Manual Diff Status PERFORMED    PERIPHERAL SMEAR REVIEW    Collection Time: 12/19/24  1:35 AM   Result Value Ref Range    Peripheral Smear Review see below    PLATELET ESTIMATE    Collection Time: 12/19/24  1:35 AM   Result Value Ref Range    Plt Estimation Normal    MORPHOLOGY    Collection Time: 12/19/24  1:35 AM   Result Value Ref Range    RBC Morphology Present     Toxic Gran Moderate    proBrain Natriuretic Peptide, NT    Collection Time: 12/19/24  1:35 AM   Result Value Ref Range    NT-proBNP 1455 (H) 0 - 125 pg/mL   POC CoV-2, FLU A/B, RSV by PCR    Collection Time: 12/19/24  1:45 AM   Result Value Ref Range    POC Influenza A RNA, PCR Negative Negative    POC Influenza B RNA, PCR Negative Negative    POC RSV, by PCR Negative Negative    POC SARS-CoV-2, PCR NotDetected NotDetected   URINALYSIS CULTURE, IF INDICATED    Collection Time: 12/19/24  3:22 AM    Specimen: Urine, Clean Catch   Result Value Ref Range    Color Dark Yellow     Character Clear     Specific Gravity 1.023 <1.035    Ph 5.5 5.0 - 8.0    Glucose Negative Negative mg/dL    Ketones Trace (A) Negative mg/dL    Protein 30 (A) Negative mg/dL    Bilirubin Negative Negative    Urobilinogen, Urine 1.0 <=1.0 EU/dL    Nitrite Negative Negative    Leukocyte Esterase Trace (A) Negative    Occult Blood Negative Negative    Micro Urine Req Microscopic    URINE MICROSCOPIC (W/UA)    Collection Time: 12/19/24  3:22 AM   Result Value Ref Range    WBC 0-2 /hpf    RBC 0-2 0 - 2 /hpf    Bacteria None Seen None /hpf    Epithelial Cells 0-2 0 - 5 /hpf    Urine Casts 0-2 0 - 2 /lpf       RADIOLOGY/PROCEDURES   I have independently interpreted the diagnostic imaging associated with this visit and am  waiting the final reading from the radiologist.   My preliminary interpretation is as follows: Possible RLL infiltrate    Radiologist interpretation:  CT-CTA CHEST PULMONARY ARTERY W/ RECONS   Final Result         1.  Bilateral lower lobe subsegmental pulmonary emboli, without findings of right heart strain   2.  Right lower lobe infiltrates, could represent component of pulmonary infarct.   3.  Atherosclerosis and atherosclerotic coronary artery disease.      These findings were discussed with the patient's clinician, Esther Henderson, on 12/19/2024 4:28 AM.      DX-CHEST-PORTABLE (1 VIEW)   Final Result         1.  Pulmonary edema and/or infiltrates, greatest in the right lung base   2.  Cardiomegaly   3.  Atherosclerosis          COURSE & MEDICAL DECISION MAKING    ASSESSMENT, COURSE AND PLAN  Care Narrative: This is a 68-year-old who presents with flulike symptoms and generalized weakness.  On arrival she has a low-grade fever and is hypoxic requiring 2 L of oxygen by nasal cannula she is hemodynamically normal, mentating normally.  She denies any injuries and did not fall or hit her head.  She has no focal deficit with an NIH stroke score of 0.  Patient does have quite a profound leukocytosis of 42,000, no significant electrolyte derangements.  Her troponin is mildly elevated at 31. Her viral swab is negative.  X-ray does show a right lower lobe pneumonia.  She does not have a urinary tract infection.  D-dimer was elevated and CT does show bilateral lower lobe subsegmental pulmonary emboli without findings of right heart strain.  She also has findings which could be a pulmonary infarct in the right lower lobe.  I have covered her with ceftriaxone for pneumonia.  She is highly allergic to azithromycin and therefore this was not added to her regimen.  I have started her on Lovenox for her PE.  She will need to be admitted to the hospital for further management.  She did remain stable on 2 L via nasal cannula and did  not require any escalation of respiratory support.  I discussed with the family medicine team who will accept the patient.        DISPOSITION AND DISCUSSIONS  I have discussed management of the patient with the following physicians and JOSE DANIEL's:  Dr. Chan    Discussion of management with other [facility] or appropriate source(s): None     Escalation of care considered, and ultimately not performed:None      FINAL DIAGNOSIS  1. Multiple subsegmental pulmonary emboli without acute cor pulmonale (HCC) Acute   2. Pneumonia of right lower lobe due to infectious organism Acute   3. Pulmonary infarct (HCC) Acute   4. Leukocytosis, unspecified type Acute        Electronically signed by: Esther Henderson M.D., 12/19/2024 2:11 AM

## 2024-12-19 NOTE — PROGRESS NOTES
4 Eyes Skin Assessment Completed by RAMEZ Castro and RAMEZ Calzada.    Head WDL  Ears WDL  Nose WDL  Mouth WDL  Neck WDL  Breast/Chest WDL  Shoulder Blades WDL  Spine WDL  (R) Arm/Elbow/Hand WDL bruising   (L) Arm/Elbow/Hand WDL bruising   Abdomen WDL  Groin WDL  Scrotum/Coccyx/Buttocks redness, blanching   (R) Leg WDL  (L) Leg WDL  (R) Heel/Foot/Toe WDL  (L) Heel/Foot/Toe WDL          Devices In Places Tele Box      Interventions In Place NC W/Ear Foams and Pillows    Possible Skin Injury No    Pictures Uploaded Into Epic N/A  Wound Consult Placed N/A  RN Wound Prevention Protocol Ordered No

## 2024-12-19 NOTE — ASSESSMENT & PLAN NOTE
On asenapine and clozapine at home.  Asenapine not on formulary.  Patient is having command auditory hallucinations.  She is scared by them and is not suicidal.  - Continue home clozapine  - Monitor for agitation and delirium

## 2024-12-19 NOTE — ASSESSMENT & PLAN NOTE
Patient with history of vasospastic angina.  Troponinemia likely secondary to right heart strain in setting of acute PE, peaked 12/19.  - Home verapamil and statin.  Hold parameters on verapamil given low normal pressures

## 2024-12-19 NOTE — H&P
"    FAMILY MEDICINE HISTORY AND PHYSICAL       PATIENT ID:  NAME:  Gena Dykes  MRN:               [id number]  YOB: 1955    Date of Admission: 12/19/2024     Attending: Dione Chan M.d.     Resident: Chris Roberts M.D.    Primary Care Physician:  Tomas Mccray D.O.    CC:    Chief Complaint   Patient presents with    Flu Like Symptoms     Pt BIBA form home for flu like symptoms x4 days and increasing weakness. Pt reports cough, chills and \"creepy feeling\". Last night pt was walking up her stairs and became extremely weak so her caregiver lowered her to the ground. -LOC, -Headstrike, -thinners. Pt found to be saturating at 88% on RA, placed on 2L NC in the field to 95%. Pt A&Ox4, GCS 15.     Weakness       HPI: Gena Dykes is a 68 y.o. female with PMHx of remote breast cancer (status post lumpectomy), CAD, prediabetes, schizophrenia, who presented with weakness and shortness of breath.     Patient reports for 4 days she has had increasing weakness, cough, chills, shortness of breath.  She is reports sometimes she feels feverish.  States she is not having chest pain except for when she coughs.     No difficulty breathing with lying down.  She has not noticed lower extremity swelling.  Denies calf pain.  Denies hemoptysis.  She denies any abdominal pain, nausea, vomiting, pain with urination, urgency, frequency.  Denies diarrhea.    Denies prolonged period of sitting or recent travel.  Denies injury to the legs recently.  Denies history of bleeding or clotting.  Denies family history of bleeding or clotting.  Reports she had a colonoscopy last year that was \"normal\" and that she is up-to-date with her mammogram screening every 1 to 2 years.  States she was told she does not have to do any more Pap smears and does not believe she has a history of abnormal Pap smears.  Reports she smoked a pack per day for her whole life until 12 years ago.    ERCourse:    Patient afebrile, heart rate " in the 80s, respiratory rate around 20, blood pressure low normal, requiring 2 L of oxygen via nasal cannula  CBC with white count of 42.7K with left shift  CMP noncontributory  Troponin of 31, NT proBNP of 1455  D-dimer of 1.13  UA with trace ketones and leuk esterase as well as protein.  RSV flu and COVID-negative  Chest x-ray with pulmonary edema versus infiltrates worst in right lung base  CTA chest with bilateral lower lobe subsegmental pulmonary emboli without evidence of right heart strain.  Right lower lobe infarct versus infiltrate  EKG low voltage without dynamic ischemic changes    Patient received ceftriaxone and therapeutic Lovenox in ED  REVIEW OF SYSTEMS:   Ten systems reviewed and were negative except as noted in the HPI.                PAST MEDICAL HISTORY:   has no past medical history on file.     PAST SURGICAL HISTORY:   has a past surgical history that includes lumpectomy (Left) and mass excision general (7/18/2017).     FAMILY HISTORY:  family history includes Heart Disease in her mother.     SOCIAL HISTORY:   Employment: Not working  Living Situation: Lives in group home   Smoking: Former, 1 ppd until 12 years ago  Etoh: occasional   Recreational Drug: Denies, former marijuana     DIET:   Orders Placed This Encounter   Procedures    Diet Order Diet: Regular     Standing Status:   Standing     Number of Occurrences:   1     Order Specific Question:   Diet:     Answer:   Regular [1]       ALLERGIES:  Allergies   Allergen Reactions    Azithromycin     Fluphenazine      hallucinations    Haldol [Haloperidol]     Erythromycin      Pt on Clozapine/no erythromycin    Haldol [Haloperidol Lactate]      Does not tolerate       OUTPATIENT MEDICATIONS:    Current Facility-Administered Medications:     enoxaparin (Lovenox) inj 60 mg, 1 mg/kg, Subcutaneous, Q12HRS, Kourtney Pope M.D.    atorvastatin (Lipitor) tablet 40 mg, 40 mg, Oral, Q EVENING, Chris Roberts M.D.    [Held by provider] verapamil  (Isoptin) tablet 40 mg, 40 mg, Oral, BID, Chris Roberts M.D.    senna-docusate (Pericolace Or Senokot S) 8.6-50 MG per tablet 2 Tablet, 2 Tablet, Oral, Q EVENING **AND** polyethylene glycol/lytes (Miralax) Packet 1 Packet, 1 Packet, Oral, QDAY PRN, Chris Roberts M.D.    cloZAPine (Clozaril) tablet 200 mg, 200 mg, Oral, QAM, 200 mg at 12/19/24 0711 **AND** cloZAPine (Clozaril) tablet 100 mg, 100 mg, Oral, AFTER LUNCH **AND** cloZAPine (Clozaril) tablet 300 mg, 300 mg, Oral, Q EVENING, Chris Roberts M.D.    [START ON 12/20/2024] amoxicillin-clavulanate (Augmentin) 875-125 MG per tablet 1 Tablet, 1 Tablet, Oral, Q12HRS, Chris Roberts M.D.    doxycycline monohydrate (Adoxa) tablet 100 mg, 100 mg, Oral, Q12HRS, Chris Roberts M.D.    Current Outpatient Medications:     atorvastatin (LIPITOR) 40 MG Tab, TAKE 1 TABLET BY MOUTH EVERY EVENING, Disp: 90 Tablet, Rfl: 3    verapamil (ISOPTIN) 40 MG Tab, TAKE 1 TABLET BY MOUTH TWICE DAILY, Disp: 180 Tablet, Rfl: 3    CALCIUM + VITAMIN D3 600-5 MG-MCG Tab, TAKE 1 TABLET BY MOUTH ONCE DAILY, Disp: 30 Tablet, Rfl: 11    docusate calcium (SURFAK) 240 MG Cap, TAKE 1 CAPSULE BY MOUTH ONCE DAILY, Disp: 30 Capsule, Rfl: 11    Omega-3 Fatty Acids (FISH OIL) 1000 MG Cap capsule, Take 1,000 mg by mouth 2 times a day., Disp: , Rfl:     Multiple Vitamin (MULTIVITAMIN) Tab, TAKE 1 TABLET BY MOUTH ONCE DAILY (Patient taking differently: Take 1 Tablet by mouth every day.), Disp: 30 Tablet, Rfl: 11    Asenapine Maleate 10 MG SL Tab, Place 1 Tablet under the tongue 2 times a day., Disp: , Rfl:     polyethylene glycol/lytes (MIRALAX) Pack, Take 17 g by mouth every day., Disp: , Rfl:     clozapine (CLOZARIL) 100 MG TABS, Take 100-300 mg by mouth in the morning, at noon, and at bedtime. Take 200 mg in am, 100 mg in the afternoon, 300 mg at night, Disp: , Rfl:     PHYSICAL EXAM:  Vitals:    12/19/24 0703 12/19/24 0803 12/19/24 0912 12/19/24 1002   BP: 92/50 96/49 97/46 102/53    Pulse: 85 90 86 85   Resp: 20 (!) 21 (!) 21 20   Temp:       TempSrc:       SpO2: 95% 91% 91% 93%   Weight:       Height:       , Temp (24hrs), Av.8 °C (100 °F), Min:37.6 °C (99.7 °F), Max:37.9 °C (100.3 °F)  , Pulse Oximetry: 93 %, O2 (LPM): 2, O2 Delivery Device: Nasal Cannula    Physical Exam  Constitutional:       General: She is not in acute distress.     Appearance: Normal appearance. She is not ill-appearing, toxic-appearing or diaphoretic.   HENT:      Head: Normocephalic and atraumatic.      Comments: Nasal cannula in place     Right Ear: External ear normal.      Left Ear: External ear normal.      Nose: Nose normal. No congestion.      Mouth/Throat:      Mouth: Mucous membranes are moist.      Pharynx: No oropharyngeal exudate.   Eyes:      General:         Right eye: No discharge.         Left eye: No discharge.      Extraocular Movements: Extraocular movements intact.   Cardiovascular:      Rate and Rhythm: Normal rate and regular rhythm.      Heart sounds: Murmur heard.   Pulmonary:      Effort: Pulmonary effort is normal. No respiratory distress.   Abdominal:      General: Abdomen is flat. Bowel sounds are normal. There is no distension.      Palpations: Abdomen is soft.      Tenderness: There is no abdominal tenderness.   Musculoskeletal:         General: Normal range of motion.      Right lower leg: Edema present.      Left lower leg: Edema present.      Comments: 1+ pitting edema bilateral lower extremity to ankle   Neurological:      General: No focal deficit present.      Mental Status: She is alert and oriented to person, place, and time. Mental status is at baseline.           LAB TESTS:   Recent Labs     24  013   WBC 42.7*   RBC 5.01   HEMOGLOBIN 14.9   HEMATOCRIT 44.2   MCV 88.2   MCH 29.7   MCHC 33.7   RDW 52.1*   PLATELETCT 328   MPV 9.9     Recent Labs     24  013   SODIUM 135   POTASSIUM 3.9   CHLORIDE 101   CO2 21   GLUCOSE 166*   BUN 14   CREATININE 0.89   CALCIUM  9.1     Recent Labs     12/19/24  0135   ALTSGPT 7   ASTSGOT 21   ALKPHOSPHAT 99   TBILIRUBIN 0.5   GLUCOSE 166*         Recent Labs     12/19/24  0135   NTPROBNP 1455*         Recent Labs     12/19/24  0135 12/19/24  0544 12/19/24  0800   TROPONINT 31* 35* 44*       CULTURES:   Results       Procedure Component Value Units Date/Time    URINALYSIS CULTURE, IF INDICATED [[id number]]  (Abnormal) Collected: 12/19/24 0322    Order Status: Completed Specimen: Urine, Clean Catch Updated: 12/19/24 0359     Color Dark Yellow     Character Clear     Specific Gravity 1.023     Ph 5.5     Glucose Negative mg/dL      Ketones Trace mg/dL      Protein 30 mg/dL      Bilirubin Negative     Urobilinogen, Urine 1.0 EU/dL      Nitrite Negative     Leukocyte Esterase Trace     Occult Blood Negative     Micro Urine Req Microscopic    URINALYSIS (UA) [[id number]]     Order Status: Canceled Specimen: Urine             IMAGES:  CT-CTA CHEST PULMONARY ARTERY W/ RECONS   Final Result         1.  Bilateral lower lobe subsegmental pulmonary emboli, without findings of right heart strain   2.  Right lower lobe infiltrates, could represent component of pulmonary infarct.   3.  Atherosclerosis and atherosclerotic coronary artery disease.      These findings were discussed with the patient's clinician, Esther Henderson, on 12/19/2024 4:28 AM.      DX-CHEST-PORTABLE (1 VIEW)   Final Result         1.  Pulmonary edema and/or infiltrates, greatest in the right lung base   2.  Cardiomegaly   3.  Atherosclerosis      EC-ECHOCARDIOGRAM COMPLETE W/O CONT    (Results Pending)       CONSULTS:   None    ASSESSMENT/PLAN:   68 y.o. female admitted for acute pulmonary emboli    I anticipate this patient will require at least two midnights for appropriate medical management, necessitating inpatient admission because acute pulmonary emboli    Patient will need a Telemetry bed secondary to acute pulmonary emboli       * Multiple subsegmental pulmonary emboli without  acute cor pulmonale (HCC)- (present on admission)  Assessment & Plan  Patient with evidence of multiple subsegmental pulmonary emboli on CTA chest.  His Pasi score of 117 given history of cancer, hypoxia. Evidence of mild right heart strain with elevated troponin and NT proBNP. No evidence of right heart strain on ECG or CTA. She is hemodynamically stable.  - Admit to telemetry  - Anticoagulation with Lovenox initiated in ED, will continue and transition to DOAC.  Likely apixaban 10mg twice daily for 7 days and then 5 mg twice daily thereafter  - Trend troponin  - Echocardiogram  - Referral to vascular medicine on discharge for consideration of anticoagulation duration  - Will also need cancer screening outpatient  - Continue to monitor for clinical instability for consideration of tPA    Prediabetes  Assessment & Plan  History of.  Last A1c in 2022.  - Monitor BMP glucose    Vasospastic angina (HCC)- (present on admission)  Assessment & Plan  Patient with history of vasospastic angina.  Troponinemia likely secondary to right heart strain in setting of acute PE.  - Trend troponin  - Home verapamil and statin.  Hold parameters on verapamil given low normal pressures    Constipation- (present on admission)  Assessment & Plan  With chronic constipation.  - Bowel regimen ordered    Paranoid schizophrenia (HCC)- (present on admission)  Assessment & Plan  On asenapine and clozapine at home.  Asenapine not on formulary.  - Continue home clozapine  - Monitor for agitation and delirium          Core Measures:  Fluids: PO  Lines: PIV  Abx: Augmentin and doxycycline  Diet: Regular  PPX: therapeutic anticoagulation with lovenox     CODE Status: Full Code

## 2024-12-19 NOTE — CARE PLAN
The patient is Stable - Low risk of patient condition declining or worsening    Shift Goals  Clinical Goals: abx, monitor labs  Patient Goals: comfort  Family Goals: heriberto    Progress made toward(s) clinical / shift goals:      Patient is not progressing towards the following goals:      Problem: Knowledge Deficit - Standard  Goal: Patient and family/care givers will demonstrate understanding of plan of care, disease process/condition, diagnostic tests and medications  Outcome: Progressing     Problem: Fall Risk  Goal: Patient will remain free from falls  Outcome: Progressing

## 2024-12-19 NOTE — ED NOTES
Urine cath done with mini cath using aseptic technique.  Procedure explained to pt prior to start and verbalized understanding.  Urine collected and sent to lab.

## 2024-12-19 NOTE — ASSESSMENT & PLAN NOTE
Patient with evidence of multiple subsegmental pulmonary emboli on CTA chest.  His Pasi score of 117 given history of cancer, hypoxia. Evidence of mild right heart strain with elevated troponin and NT proBNP. No evidence of right heart strain on ECG or CTA. She is hemodynamically stable.  - Telemetry  - 12/20 starting apixaban 10mg twice daily for 7 days and then 5 mg twice daily thereafter.  Discussed with pharmacy no need for bridge  - Echocardiogram  - Referral to vascular medicine on discharge for consideration of anticoagulation duration  - Will also need cancer screening outpatient  - Continue to monitor for clinical instability, titrate oxygen to goal

## 2024-12-19 NOTE — ED TRIAGE NOTES
"Chief Complaint   Patient presents with    Flu Like Symptoms     Pt BIBA form home for flu like symptoms x4 days and increasing weakness. Pt reports cough, chills and \"creepy feeling\". Last night pt was walking up her stairs and became extremely weak so her caregiver lowered her to the ground. -LOC, -Headstrike, -thinners. Pt found to be saturating at 88% on RA, placed on 2L NC in the field to 95%. Pt A&Ox4, GCS 15.     Weakness     Vitals:    12/19/24 0130   BP: 117/59   Pulse: 98   Resp: 20   Temp: 37.9 °C (100.3 °F)   SpO2: 92%       "

## 2024-12-20 LAB
ANION GAP SERPL CALC-SCNC: 9 MMOL/L (ref 7–16)
BASOPHILS # BLD AUTO: 0.4 % (ref 0–1.8)
BASOPHILS # BLD: 0.11 K/UL (ref 0–0.12)
BUN SERPL-MCNC: 14 MG/DL (ref 8–22)
CALCIUM SERPL-MCNC: 8.8 MG/DL (ref 8.5–10.5)
CHLORIDE SERPL-SCNC: 101 MMOL/L (ref 96–112)
CO2 SERPL-SCNC: 25 MMOL/L (ref 20–33)
CREAT SERPL-MCNC: 0.69 MG/DL (ref 0.5–1.4)
EOSINOPHIL # BLD AUTO: 0.11 K/UL (ref 0–0.51)
EOSINOPHIL NFR BLD: 0.4 % (ref 0–6.9)
ERYTHROCYTE [DISTWIDTH] IN BLOOD BY AUTOMATED COUNT: 56.3 FL (ref 35.9–50)
GFR SERPLBLD CREATININE-BSD FMLA CKD-EPI: 94 ML/MIN/1.73 M 2
GLUCOSE SERPL-MCNC: 104 MG/DL (ref 65–99)
HCT VFR BLD AUTO: 41.5 % (ref 37–47)
HGB BLD-MCNC: 13.4 G/DL (ref 12–16)
IMM GRANULOCYTES # BLD AUTO: 0.45 K/UL (ref 0–0.11)
IMM GRANULOCYTES NFR BLD AUTO: 1.7 % (ref 0–0.9)
LYMPHOCYTES # BLD AUTO: 1.09 K/UL (ref 1–4.8)
LYMPHOCYTES NFR BLD: 4.2 % (ref 22–41)
MCH RBC QN AUTO: 29.6 PG (ref 27–33)
MCHC RBC AUTO-ENTMCNC: 32.3 G/DL (ref 32.2–35.5)
MCV RBC AUTO: 91.6 FL (ref 81.4–97.8)
MONOCYTES # BLD AUTO: 1.35 K/UL (ref 0–0.85)
MONOCYTES NFR BLD AUTO: 5.2 % (ref 0–13.4)
NEUTROPHILS # BLD AUTO: 22.64 K/UL (ref 1.82–7.42)
NEUTROPHILS NFR BLD: 88.1 % (ref 44–72)
NRBC # BLD AUTO: 0 K/UL
NRBC BLD-RTO: 0 /100 WBC (ref 0–0.2)
PLATELET # BLD AUTO: 313 K/UL (ref 164–446)
PMV BLD AUTO: 10.6 FL (ref 9–12.9)
POTASSIUM SERPL-SCNC: 4 MMOL/L (ref 3.6–5.5)
RBC # BLD AUTO: 4.53 M/UL (ref 4.2–5.4)
SODIUM SERPL-SCNC: 135 MMOL/L (ref 135–145)
WBC # BLD AUTO: 25.8 K/UL (ref 4.8–10.8)

## 2024-12-20 PROCEDURE — 700102 HCHG RX REV CODE 250 W/ 637 OVERRIDE(OP)

## 2024-12-20 PROCEDURE — 97162 PT EVAL MOD COMPLEX 30 MIN: CPT

## 2024-12-20 PROCEDURE — 97165 OT EVAL LOW COMPLEX 30 MIN: CPT

## 2024-12-20 PROCEDURE — A9270 NON-COVERED ITEM OR SERVICE: HCPCS

## 2024-12-20 PROCEDURE — 85025 COMPLETE CBC W/AUTO DIFF WBC: CPT

## 2024-12-20 PROCEDURE — 770020 HCHG ROOM/CARE - TELE (206)

## 2024-12-20 PROCEDURE — 36415 COLL VENOUS BLD VENIPUNCTURE: CPT

## 2024-12-20 PROCEDURE — 99232 SBSQ HOSP IP/OBS MODERATE 35: CPT | Mod: GC | Performed by: STUDENT IN AN ORGANIZED HEALTH CARE EDUCATION/TRAINING PROGRAM

## 2024-12-20 PROCEDURE — 700111 HCHG RX REV CODE 636 W/ 250 OVERRIDE (IP)

## 2024-12-20 PROCEDURE — 80048 BASIC METABOLIC PNL TOTAL CA: CPT

## 2024-12-20 RX ORDER — POLYETHYLENE GLYCOL 3350 17 G/17G
1 POWDER, FOR SOLUTION ORAL DAILY
Status: DISCONTINUED | OUTPATIENT
Start: 2024-12-20 | End: 2024-12-21 | Stop reason: HOSPADM

## 2024-12-20 RX ADMIN — SENNOSIDES AND DOCUSATE SODIUM 2 TABLET: 50; 8.6 TABLET ORAL at 17:28

## 2024-12-20 RX ADMIN — ATORVASTATIN CALCIUM 40 MG: 40 TABLET, FILM COATED ORAL at 17:29

## 2024-12-20 RX ADMIN — DOXYCYCLINE 100 MG: 100 TABLET, FILM COATED ORAL at 05:05

## 2024-12-20 RX ADMIN — APIXABAN 10 MG: 5 TABLET, FILM COATED ORAL at 17:27

## 2024-12-20 RX ADMIN — AMOXICILLIN AND CLAVULANATE POTASSIUM 1 TABLET: 875; 125 TABLET, FILM COATED ORAL at 05:05

## 2024-12-20 RX ADMIN — DOXYCYCLINE 100 MG: 100 TABLET, FILM COATED ORAL at 17:26

## 2024-12-20 RX ADMIN — POLYETHYLENE GLYCOL 3350 1 PACKET: 17 POWDER, FOR SOLUTION ORAL at 14:34

## 2024-12-20 RX ADMIN — AMOXICILLIN AND CLAVULANATE POTASSIUM 1 TABLET: 875; 125 TABLET, FILM COATED ORAL at 17:37

## 2024-12-20 RX ADMIN — CLOZAPINE 200 MG: 100 TABLET ORAL at 05:05

## 2024-12-20 RX ADMIN — VERAPAMIL HYDROCHLORIDE 40 MG: 80 TABLET ORAL at 17:30

## 2024-12-20 RX ADMIN — ENOXAPARIN SODIUM 60 MG: 100 INJECTION SUBCUTANEOUS at 05:05

## 2024-12-20 RX ADMIN — CLOZAPINE 100 MG: 100 TABLET ORAL at 11:29

## 2024-12-20 RX ADMIN — CLOZAPINE 300 MG: 100 TABLET ORAL at 17:24

## 2024-12-20 ASSESSMENT — GAIT ASSESSMENTS
DISTANCE (FEET): 10
DEVIATION: DECREASED BASE OF SUPPORT;SHUFFLED GAIT
GAIT LEVEL OF ASSIST: CONTACT GUARD ASSIST
ASSISTIVE DEVICE: FRONT WHEEL WALKER

## 2024-12-20 ASSESSMENT — COGNITIVE AND FUNCTIONAL STATUS - GENERAL
SUGGESTED CMS G CODE MODIFIER DAILY ACTIVITY: CJ
WALKING IN HOSPITAL ROOM: A LITTLE
SUGGESTED CMS G CODE MODIFIER MOBILITY: CK
CLIMB 3 TO 5 STEPS WITH RAILING: A LOT
STANDING UP FROM CHAIR USING ARMS: A LITTLE
MOVING FROM LYING ON BACK TO SITTING ON SIDE OF FLAT BED: A LITTLE
MOVING TO AND FROM BED TO CHAIR: A LITTLE
MOBILITY SCORE: 18
TOILETING: A LITTLE
DRESSING REGULAR LOWER BODY CLOTHING: A LITTLE
HELP NEEDED FOR BATHING: A LITTLE
DAILY ACTIVITIY SCORE: 21

## 2024-12-20 ASSESSMENT — PAIN DESCRIPTION - PAIN TYPE: TYPE: ACUTE PAIN

## 2024-12-20 ASSESSMENT — FIBROSIS 4 INDEX: FIB4 SCORE: 1.62

## 2024-12-20 ASSESSMENT — ACTIVITIES OF DAILY LIVING (ADL): TOILETING: INDEPENDENT

## 2024-12-20 NOTE — PROGRESS NOTES
Bedside report received, pt care assumed, tele box on.Pt resting, breathing unlabored. Bed in lowest position, bed alarm on, call light within reach.

## 2024-12-20 NOTE — PROGRESS NOTES
Recieved report from RN. Assumed pt care. Patient on 1 L, spO2 92%. Patient A&O 4. Fall precautions in place, call light and belongings within reach, bed in lowest position. No signs of distress.

## 2024-12-20 NOTE — PROGRESS NOTES
Monitor summary:        Rhythm: SR   Rate: 74-90  Ectopy: na  Measurements: .13/.09/.33        12hr chart check

## 2024-12-20 NOTE — PROGRESS NOTES
Bedside report received, pt care assumed, tele box on.  Pt denies any additional needs at this time. Bed in lowest position, bed alarm on pt educated on fall risk and verbalized understanding, call light within reach.

## 2024-12-20 NOTE — PROGRESS NOTES
FAMILY MEDICINE PROGRESS NOTE          PATIENT ID:  NAME:  Gena Dykes  MRN:               [id number]  YOB: 1955    Date of Admission: 12/19/2024     Attending: Dione Chan M.d.     Resident: Chris Roberts M.D.    Primary Care Physician:  Tomas Mccray D.O.      SUBJECTIVE:   No acute events overnight, patient reports she feels less weak than prior to admission.  She still has weakness but is not short of breath and does not have chest pain or cough at this point.  Denies fever, chills, abdominal pain, nausea, vomiting, diarrhea.    Patient does state that she hears voices which is her baseline.  She is scared and sad that the voices are telling her she should die because she is afraid of death and does not want to die. She says the voices are usually nice to her.     OBJECTIVE:  Temp:  [36.4 °C (97.5 °F)-37.1 °C (98.8 °F)] 36.6 °C (97.9 °F)  Pulse:  [76-92] 76  Resp:  [15-18] 15  BP: ()/(55-71) 121/70  SpO2:  [90 %-94 %] 94 %      Intake/Output Summary (Last 24 hours) at 12/20/2024 0716  Last data filed at 12/19/2024 1200  Gross per 24 hour   Intake 240 ml   Output --   Net 240 ml       PHYSICAL EXAM:  Physical Exam  Constitutional:       General: She is not in acute distress.     Appearance: She is not ill-appearing, toxic-appearing or diaphoretic.   HENT:      Head: Normocephalic.      Right Ear: External ear normal.      Left Ear: External ear normal.      Nose: Nose normal. No congestion.      Mouth/Throat:      Mouth: Mucous membranes are moist.      Pharynx: No oropharyngeal exudate.   Eyes:      General:         Right eye: No discharge.         Left eye: No discharge.      Extraocular Movements: Extraocular movements intact.   Cardiovascular:      Rate and Rhythm: Normal rate and regular rhythm.      Pulses: Normal pulses.      Heart sounds: No murmur heard.  Pulmonary:      Effort: Pulmonary effort is normal. No respiratory distress.   Abdominal:      General:  "Abdomen is flat. There is no distension.      Palpations: Abdomen is soft.      Tenderness: There is no abdominal tenderness.   Musculoskeletal:      Right lower leg: Edema present.      Left lower leg: Edema present.      Comments: 1+ bilateral   Neurological:      General: No focal deficit present.      Mental Status: She is oriented to person, place, and time.         LABS:  Recent Labs     12/19/24 0135 12/20/24  0338   WBC 42.7* 25.8*   RBC 5.01 4.53   HEMOGLOBIN 14.9 13.4   HEMATOCRIT 44.2 41.5   MCV 88.2 91.6   MCH 29.7 29.6   RDW 52.1* 56.3*   PLATELETCT 328 313   MPV 9.9 10.6   NEUTSPOLYS 92.40* 88.10*   LYMPHOCYTES 0.00* 4.20*   MONOCYTES 1.70 5.20   EOSINOPHILS 0.00 0.40   BASOPHILS 0.00 0.40   RBCMORPHOLO Present  --      Recent Labs     12/19/24 0135 12/20/24  0338   SODIUM 135 135   POTASSIUM 3.9 4.0   CHLORIDE 101 101   CO2 21 25   BUN 14 14   CREATININE 0.89 0.69   CALCIUM 9.1 8.8   ALBUMIN 3.4  --      Estimated GFR/CRCL = Estimated Creatinine Clearance: 71.9 mL/min (by C-G formula based on SCr of 0.69 mg/dL).  Recent Labs     12/19/24 0135 12/20/24  0338   GLUCOSE 166* 104*     Recent Labs     12/19/24 0135   ASTSGOT 21   ALTSGPT 7   TBILIRUBIN 0.5   ALKPHOSPHAT 99   GLOBULIN 3.6*             No results for input(s): \"INR\", \"APTT\", \"FIBRINOGEN\" in the last 72 hours.    Invalid input(s): \"DIMER\"      IMAGING:  CT-CTA CHEST PULMONARY ARTERY W/ RECONS   Final Result         1.  Bilateral lower lobe subsegmental pulmonary emboli, without findings of right heart strain   2.  Right lower lobe infiltrates, could represent component of pulmonary infarct.   3.  Atherosclerosis and atherosclerotic coronary artery disease.      These findings were discussed with the patient's clinician, Esther Henderson, on 12/19/2024 4:28 AM.      DX-CHEST-PORTABLE (1 VIEW)   Final Result         1.  Pulmonary edema and/or infiltrates, greatest in the right lung base   2.  Cardiomegaly   3.  Atherosclerosis    "   EC-ECHOCARDIOGRAM COMPLETE W/O CONT    (Results Pending)       CULTURES:   Results       Procedure Component Value Units Date/Time    URINALYSIS CULTURE, IF INDICATED [[id number]]  (Abnormal) Collected: 12/19/24 0322    Order Status: Completed Specimen: Urine, Clean Catch Updated: 12/19/24 0359     Color Dark Yellow     Character Clear     Specific Gravity 1.023     Ph 5.5     Glucose Negative mg/dL      Ketones Trace mg/dL      Protein 30 mg/dL      Bilirubin Negative     Urobilinogen, Urine 1.0 EU/dL      Nitrite Negative     Leukocyte Esterase Trace     Occult Blood Negative     Micro Urine Req Microscopic    URINALYSIS (UA) [[id number]]     Order Status: Canceled Specimen: Urine             MEDS:  Current Facility-Administered Medications   Medication Last Admin    apixaban (Eliquis) tablet 10 mg      [START ON 12/27/2024] apixaban (Eliquis) tablet 5 mg      polyethylene glycol/lytes (Miralax) Packet 1 Packet      atorvastatin (Lipitor) tablet 40 mg 40 mg at 12/19/24 1713    verapamil (Isoptin) tablet 40 mg      senna-docusate (Pericolace Or Senokot S) 8.6-50 MG per tablet 2 Tablet 2 Tablet at 12/19/24 1713    cloZAPine (Clozaril) tablet 200 mg 200 mg at 12/20/24 0505    And    cloZAPine (Clozaril) tablet 100 mg 100 mg at 12/20/24 1129    And    cloZAPine (Clozaril) tablet 300 mg 300 mg at 12/19/24 1751    amoxicillin-clavulanate (Augmentin) 875-125 MG per tablet 1 Tablet 1 Tablet at 12/20/24 0505    doxycycline monohydrate (Adoxa) tablet 100 mg 100 mg at 12/20/24 0505       ASSESSMENT/PLAN:  68 y.o. female admitted for acute PE, stable.   * Multiple subsegmental pulmonary emboli without acute cor pulmonale (HCC)- (present on admission)  Assessment & Plan  Patient with evidence of multiple subsegmental pulmonary emboli on CTA chest.  His Pasi score of 117 given history of cancer, hypoxia. Evidence of mild right heart strain with elevated troponin and NT proBNP. No evidence of right heart strain on ECG or CTA.  She is hemodynamically stable.  - Telemetry  - 12/20 starting apixaban 10mg twice daily for 7 days and then 5 mg twice daily thereafter.  Discussed with pharmacy no need for bridge  - Echocardiogram  - Referral to vascular medicine on discharge for consideration of anticoagulation duration  - Will also need cancer screening outpatient  - Continue to monitor for clinical instability, titrate oxygen to goal     Prediabetes  Assessment & Plan  History of.  Last A1c in 2022.  - Monitor BMP glucose    Vasospastic angina (HCC)- (present on admission)  Assessment & Plan  Patient with history of vasospastic angina.  Troponinemia likely secondary to right heart strain in setting of acute PE, peaked 12/19.  - Home verapamil and statin.  Hold parameters on verapamil given low normal pressures    Constipation- (present on admission)  Assessment & Plan  With chronic constipation.  - Bowel regimen ordered with scheduled MiraLAX    Paranoid schizophrenia (HCC)- (present on admission)  Assessment & Plan  On asenapine and clozapine at home.  Asenapine not on formulary.  Patient is having command auditory hallucinations.  She is scared by them and is not suicidal.  - Continue home clozapine  - Monitor for agitation and delirium         Core Measures:  Fluids: PO  Lines: PIV  Abx: Augmentin and doxycycline  Diet: Regular   PPX: therapeutic anticoagulation with lovenox, transition to apixaban     CODE Status: Full Code      Disposition  Patient is not medically cleared for discharge.   Anticipate discharge to to home with close outpatient follow-up.  I have placed the appropriate orders for post-discharge needs.      Chris Roberts M.D.

## 2024-12-20 NOTE — THERAPY
Physical Therapy   Initial Evaluation     Patient Name: Gena Dykes  Age:  68 y.o., Sex:  female  Medical Record #: [id number]  Today's Date: 12/20/2024     Precautions  Precautions: Fall Risk    Assessment  Patient is 68 y.o. female presenting to acute setting with flu like symptoms and SOB.  PMHx of remote breast cancer (status post lumpectomy), CAD, prediabetes, schizophrenia. Medical work up noted multiple subsegmental PEs and elevated troponins due to heart strain. Pt presents to physical therapy requiring Min to CGA to complete mobility as detailed below. Demonstrated impaired functional transfers, balance, gait and activity tolerance. Pt will benefit from acute PT interventions to address current impairments and optimize functional independence and safety prior to return to Group Home.       Plan    Physical Therapy Initial Treatment Plan   Treatment Plan : Bed Mobility, Equipment, Gait Training, Family / Caregiver Training, Neuro Re-Education / Balance, Stair Training, Self Care / Home Evaluation, Therapeutic Exercise, Therapeutic Activities  Treatment Frequency: 3 Times per Week  Duration: Until Therapy Goals Met    DC Equipment Recommendations:  (possibly FWW if facility does not have one for pt to use)  Discharge Recommendations: Recommend home health for continued physical therapy services (IF staff at [facility] able to provide supervision upon DC home)          Objective       12/20/24 0850   Precautions   Precautions Fall Risk   Vitals   O2 (LPM) 1   O2 Delivery Device Silicone Nasal Cannula   Pain 0 - 10 Group   Therapist Pain Assessment During Activity;Nurse Notified;0   Prior Living Situation   Housing / Facility Group Home   Steps Into Home 0   Steps In Home 0   Equipment Owned None   Lives with - Patient's Self Care Capacity Attendant / Paid Care Giver   Comments pt reports she has been a resident of this group home off / on since the 80s and feels very comfortable and cared for there   Prior  Level of Functional Mobility   Comments unreliable historian, but pt reports she is fully independent at baseline without need for AD   Cognition    Level of Consciousness Alert   Comments pleasant and receptive to PT   Active ROM Lower Body    Active ROM Lower Body  WDL   Strength Lower Body   Lower Body Strength  WDL   Comments adequate for functional mobility   Balance Assessment   Sitting Balance (Static) Fair   Sitting Balance (Dynamic) Fair   Standing Balance (Static) Fair   Standing Balance (Dynamic) Fair -   Weight Shift Sitting Fair   Weight Shift Standing Fair   Comments with FWW, expressed awareness of need for FWW for safety   Bed Mobility    Supine to Sit Minimal Assist   Sit to Supine   (seated in chair at end of session)   Scooting Standby Assist   Rolling Standby Assist   Comments cues and physical assist with log roll OOB   Gait Analysis   Gait Level Of Assist Contact Guard Assist   Assistive Device Front Wheel Walker   Distance (Feet) 10   # of Times Distance was Traveled 1   Deviation Decreased Base Of Support;Shuffled Gait   # of Stairs Climbed 0   Weight Bearing Status no restrictions   Comments shuffled gait with 1 LOB during transfer to bedside chair   Functional Mobility   Sit to Stand Contact Guard Assist   Bed, Chair, Wheelchair Transfer Minimal Assist  (to correct LOB during transfer)   6 Clicks Assessment - How much HELP from from another person do you currently need... (If the patient hasn't done an activity recently, how much help from another person do you think he/she would need if he/she tried?)   Turning from your back to your side while in a flat bed without using bedrails? 4   Moving from lying on your back to sitting on the side of a flat bed without using bedrails? 3   Moving to and from a bed to a chair (including a wheelchair)? 3   Standing up from a chair using your arms (e.g., wheelchair, or bedside chair)? 3   Walking in hospital room? 3   Climbing 3-5 steps with a railing?  2   6 clicks Mobility Score 18   Short Term Goals    Short Term Goal # 1 pt will perform supine <> sit without bed features and SPV in 6 visits   Short Term Goal # 2 pt will perform sit <> stand and functional transfers with LRAD and SPV to improve mobility independence in 6 visits   Short Term Goal # 3 pt will ambulate 75 ft with LRD and SPV to access home environment in 6 visits   Short Term Goal # 4 pt will negotiate steps as needed to enter/exit group home with SPV in 6 visits   Education Group   Education Provided Role of Physical Therapist   Role of Physical Therapist Patient Response Patient;Acceptance;Explanation;Reinforcement Needed   Physical Therapy Initial Treatment Plan    Treatment Plan  Bed Mobility;Equipment;Gait Training;Family / Caregiver Training;Neuro Re-Education / Balance;Stair Training;Self Care / Home Evaluation;Therapeutic Exercise;Therapeutic Activities   Treatment Frequency 3 Times per Week   Duration Until Therapy Goals Met   Problem List    Problems Impaired Bed Mobility;Impaired Transfers;Impaired Ambulation;Impaired Balance;Decreased Activity Tolerance;Safety Awareness Deficits / Cognition   Anticipated Discharge Equipment and Recommendations   DC Equipment Recommendations   (possibly FWW if facility does not have one for pt to use)   Discharge Recommendations Recommend home health for continued physical therapy services  (IF staff at snf able to provide supervision upon DC home)

## 2024-12-21 ENCOUNTER — PHARMACY VISIT (OUTPATIENT)
Dept: PHARMACY | Facility: MEDICAL CENTER | Age: 69
End: 2024-12-21
Payer: MEDICARE

## 2024-12-21 VITALS
DIASTOLIC BLOOD PRESSURE: 75 MMHG | WEIGHT: 144.4 LBS | HEIGHT: 63 IN | SYSTOLIC BLOOD PRESSURE: 136 MMHG | HEART RATE: 79 BPM | TEMPERATURE: 97.9 F | BODY MASS INDEX: 25.59 KG/M2 | RESPIRATION RATE: 17 BRPM | OXYGEN SATURATION: 91 %

## 2024-12-21 LAB
ANION GAP SERPL CALC-SCNC: 10 MMOL/L (ref 7–16)
BASOPHILS # BLD AUTO: 0.4 % (ref 0–1.8)
BASOPHILS # BLD: 0.05 K/UL (ref 0–0.12)
BUN SERPL-MCNC: 13 MG/DL (ref 8–22)
CALCIUM SERPL-MCNC: 8.8 MG/DL (ref 8.5–10.5)
CHLORIDE SERPL-SCNC: 105 MMOL/L (ref 96–112)
CO2 SERPL-SCNC: 25 MMOL/L (ref 20–33)
CREAT SERPL-MCNC: 0.66 MG/DL (ref 0.5–1.4)
EOSINOPHIL # BLD AUTO: 0.11 K/UL (ref 0–0.51)
EOSINOPHIL NFR BLD: 1 % (ref 0–6.9)
ERYTHROCYTE [DISTWIDTH] IN BLOOD BY AUTOMATED COUNT: 54.7 FL (ref 35.9–50)
GFR SERPLBLD CREATININE-BSD FMLA CKD-EPI: 96 ML/MIN/1.73 M 2
GLUCOSE SERPL-MCNC: 89 MG/DL (ref 65–99)
HCT VFR BLD AUTO: 41.3 % (ref 37–47)
HGB BLD-MCNC: 13.7 G/DL (ref 12–16)
IMM GRANULOCYTES # BLD AUTO: 0.18 K/UL (ref 0–0.11)
IMM GRANULOCYTES NFR BLD AUTO: 1.6 % (ref 0–0.9)
LYMPHOCYTES # BLD AUTO: 0.94 K/UL (ref 1–4.8)
LYMPHOCYTES NFR BLD: 8.2 % (ref 22–41)
MCH RBC QN AUTO: 29.8 PG (ref 27–33)
MCHC RBC AUTO-ENTMCNC: 33.2 G/DL (ref 32.2–35.5)
MCV RBC AUTO: 90 FL (ref 81.4–97.8)
MONOCYTES # BLD AUTO: 0.76 K/UL (ref 0–0.85)
MONOCYTES NFR BLD AUTO: 6.7 % (ref 0–13.4)
NEUTROPHILS # BLD AUTO: 9.36 K/UL (ref 1.82–7.42)
NEUTROPHILS NFR BLD: 82.1 % (ref 44–72)
NRBC # BLD AUTO: 0 K/UL
NRBC BLD-RTO: 0 /100 WBC (ref 0–0.2)
PLATELET # BLD AUTO: 379 K/UL (ref 164–446)
PMV BLD AUTO: 10.3 FL (ref 9–12.9)
POTASSIUM SERPL-SCNC: 4.3 MMOL/L (ref 3.6–5.5)
RBC # BLD AUTO: 4.59 M/UL (ref 4.2–5.4)
SODIUM SERPL-SCNC: 140 MMOL/L (ref 135–145)
WBC # BLD AUTO: 11.4 K/UL (ref 4.8–10.8)

## 2024-12-21 PROCEDURE — 99238 HOSP IP/OBS DSCHRG MGMT 30/<: CPT | Mod: GC | Performed by: HOSPITALIST

## 2024-12-21 PROCEDURE — A9270 NON-COVERED ITEM OR SERVICE: HCPCS

## 2024-12-21 PROCEDURE — 700102 HCHG RX REV CODE 250 W/ 637 OVERRIDE(OP)

## 2024-12-21 PROCEDURE — 85025 COMPLETE CBC W/AUTO DIFF WBC: CPT

## 2024-12-21 PROCEDURE — RXMED WILLOW AMBULATORY MEDICATION CHARGE

## 2024-12-21 PROCEDURE — 80048 BASIC METABOLIC PNL TOTAL CA: CPT

## 2024-12-21 RX ORDER — DOXYCYCLINE 100 MG/1
100 TABLET ORAL EVERY 12 HOURS
Qty: 5 TABLET | Refills: 0 | Status: ACTIVE | OUTPATIENT
Start: 2024-12-21 | End: 2024-12-24

## 2024-12-21 RX ORDER — DOXYCYCLINE 100 MG/1
100 TABLET ORAL EVERY 12 HOURS
Qty: 5 TABLET | Refills: 0 | Status: SHIPPED | OUTPATIENT
Start: 2024-12-21 | End: 2024-12-21

## 2024-12-21 RX ADMIN — AMOXICILLIN AND CLAVULANATE POTASSIUM 1 TABLET: 875; 125 TABLET, FILM COATED ORAL at 04:21

## 2024-12-21 RX ADMIN — VERAPAMIL HYDROCHLORIDE 40 MG: 80 TABLET ORAL at 04:21

## 2024-12-21 RX ADMIN — DOXYCYCLINE 100 MG: 100 TABLET, FILM COATED ORAL at 04:21

## 2024-12-21 RX ADMIN — CLOZAPINE 100 MG: 100 TABLET ORAL at 11:14

## 2024-12-21 RX ADMIN — CLOZAPINE 200 MG: 100 TABLET ORAL at 04:23

## 2024-12-21 RX ADMIN — APIXABAN 10 MG: 5 TABLET, FILM COATED ORAL at 04:22

## 2024-12-21 ASSESSMENT — PAIN DESCRIPTION - PAIN TYPE: TYPE: ACUTE PAIN

## 2024-12-21 ASSESSMENT — FIBROSIS 4 INDEX: FIB4 SCORE: 1.4

## 2024-12-21 NOTE — CARE PLAN
The patient is Watcher - Medium risk of patient condition declining or worsening    Shift Goals  Clinical Goals: wean O2, abx admin, mobility  Patient Goals: rest, get up in chair  Family Goals: heriberto    Progress made toward(s) clinical / shift goals:       Problem: Fall Risk  Goal: Patient will remain free from falls  Outcome: Progressing     Patient educated on utilizing her call light. Bed in low position and locked. Pt is wearing non skid socks.     Problem: Urinary Elimination  Goal: Establish and maintain regular urinary output  Outcome: Progressing    Patient maintained a regular urinary output all shift. She is staying well hydrated. Her urine is yellow and clear.      Problem: Mobility  Goal: Patient's capacity to carry out activities will improve  Outcome: Progressing     Patient mobilized to the bathroom frequently throughout the day and was up in the chair for meals.

## 2024-12-21 NOTE — PROGRESS NOTES
Monitor summary:        Rhythm: SR, ST  Rate:   Ectopy:   Measurements: .15/.07/.38        12hr chart check

## 2024-12-21 NOTE — THERAPY
"Occupational Therapy   Initial Evaluation     Patient Name: Gena Dykes  Age:  68 y.o., Sex:  female  Medical Record #: [id number]  Today's Date: 12/20/2024     Precautions  Precautions: Fall Risk    Assessment  Patient is a 68 y.o. female with a diagnosis of pulmonary emboli with evidence of mild right heart strain with elevated troponin. Additional factors influencing patient status / progress: PMHx includes remote breast cancer (status post lumpectomy), CAD, prediabetes, schizophrenia.     During OT eval, pt needed overall SBA for her functional mobility to the bathroom with the FWW. Pt declined need for toileting, g/h and was fully dressed in her own pajamas but demo's good tailor sit for distal LBD mgmt. Pt reports she is typically independent with her ADLs at her group home and states staff there can assist as needed; recommend verifying support they can provide. Anticipate 1-2 additional OT sessions to address goals below if pt remains in house. Otherwise anticipate no further OT needs upon discharge IF group home staff can assist pt if needed.     Plan    Occupational Therapy Initial Treatment Plan   Treatment Interventions: (P) Self Care / Activities of Daily Living, Adaptive Equipment, Neuro Re-Education / Balance, Therapeutic Exercises, Therapeutic Activity  Treatment Frequency: (P) 3 Times per Week  Duration: (P) Until Therapy Goals Met    DC Equipment Recommendations: (P) Unable to determine at this time  Discharge Recommendations: (P) Anticipate that the patient will have no further occupational therapy needs after discharge from the hospital (IF group home staff and assist pt as needed)     Subjective    \"You look just like my niece, you should meet her.\"      Objective     12/20/24 1511   Initial Contact Note    Initial Contact Note Order Received and Verified, Occupational Therapy Evaluation in Progress with Full Report to Follow.   Prior Living Situation   Prior Services Intermittent Physical " Support for ADL Per Service   Housing / Facility Group Home   Bathroom Set up Bathtub / Shower Combination   Equipment Owned None   Lives with - Patient's Self Care Capacity Attendant / Paid Care Giver   Comments Pt reports group home staff can assist pt if needed; recommend verifying with group home   Prior Level of ADL Function   Self Feeding Independent   Grooming / Hygiene Independent   Bathing Independent   Dressing Independent   Toileting Independent   Prior Level of IADL Function   Medication Management Requires Assist   Laundry Requires Assist   Kitchen Mobility Independent   Finances Requires Assist   Home Management Requires Assist   Shopping Requires Assist   Prior Level Of Mobility Independent Without Device in Home   Driving / Transportation Relatives / Others Provide Transportation   Pain   Intervention Declines   Cognition    Level of Consciousness Alert   Comments Pleasant and cooperative   Active ROM Upper Body   Active ROM Upper Body  WDL   Strength Upper Body   Upper Body Strength  WDL   Sensation Upper Body   Upper Extremity Sensation  Not Tested   Upper Body Muscle Tone   Upper Body Muscle Tone  WDL   Neurological Concerns   Neurological Concerns No   Coordination Upper Body   Coordination WDL   Balance Assessment   Sitting Balance (Static) Fair +   Sitting Balance (Dynamic) Fair +   Standing Balance (Static) Fair   Standing Balance (Dynamic) Fair -   Weight Shift Sitting Good   Weight Shift Standing Good   Comments FWW   Bed Mobility    Supine to Sit Supervised   Sit to Supine Supervised   Scooting Supervised   Rolling Supervised   Comments HOB elevated   ADL Assessment   Grooming   (declined, reports she already completed today)   Lower Body Dressing   (wearing full PJ set, demo's good tailor sit for distal mgmt)   Toileting   (declined need)   How much help from another person does the patient currently need...   Putting on and taking off regular lower body clothing? 3   Bathing (including  washing, rinsing, and drying)? 3   Toileting, which includes using a toilet, bedpan, or urinal? 3   Putting on and taking off regular upper body clothing? 4   Taking care of personal grooming such as brushing teeth? 4   Eating meals? 4   6 Clicks Daily Activity Score 21   Functional Mobility   Sit to Stand Standby Assist   Bed, Chair, Wheelchair Transfer Standby Assist   Toilet Transfers Standby Assist   Transfer Method Stand Step   Mobility EOB<>bathroom with FWW   Activity Tolerance   Sitting Edge of Bed 2 min   Standing 2 min   Comments could likely tolerate more activity, requested back to bed   Short Term Goals   Short Term Goal # 1 Pt will perform standing g/h at the sink with SPV   Short Term Goal # 2 Pt will perform ADL transfers/related mobility with SPV   Education Group   Role of Occupational Therapist Patient Response Patient;Acceptance;Explanation;Verbal Demonstration   Occupational Therapy Initial Treatment Plan    Treatment Interventions Self Care / Activities of Daily Living;Adaptive Equipment;Neuro Re-Education / Balance;Therapeutic Exercises;Therapeutic Activity   Treatment Frequency 3 Times per Week   Duration Until Therapy Goals Met   Problem List   Problem List Decreased Homemaking Skills;Decreased Functional Mobility   Anticipated Discharge Equipment and Recommendations   DC Equipment Recommendations Unable to determine at this time   Discharge Recommendations Anticipate that the patient will have no further occupational therapy needs after discharge from the hospital  (IF group home staff and assist pt as needed)   Interdisciplinary Plan of Care Collaboration   Patient Position at End of Therapy In Bed;Bed Alarm On;Call Light within Reach;Tray Table within Reach   Session Information   Date / Session Number  12/20 #1 (1/3, 12/26) likely 1-2 more

## 2024-12-21 NOTE — CARE PLAN
The patient is Stable - Low risk of patient condition declining or worsening    Shift Goals  Clinical Goals: discharge, wean oxygen  Patient Goals: comfort, rest  Family Goals: heriberto    Progress made toward(s) clinical / shift goals:       Problem: Fall Risk  Goal: Patient will remain free from falls  Outcome: Progressing    Patient educated on utilizing her call light. Bed in low position and locked. Pt is wearing non skid socks.      Problem: Urinary Elimination  Goal: Establish and maintain regular urinary output  Outcome: Progressing     Patient maintained a regular urinary output all shift. She is staying well hydrated. Her urine is yellow and clear.

## 2024-12-21 NOTE — PROGRESS NOTES
Recieved report from RN. Assumed pt care. Patient on tele box and spO2 at 95% on 1 L NC. Patient A&O x 4. Fall precautions in place, call light and belongings within reach, bed in lowest position. No signs of distress.

## 2024-12-21 NOTE — PROGRESS NOTES
Bedside report received, pt care assumed, tele box on.  Pt denied any additional needs at this time. Bed in lowest position, bed alarm on, pt educated on fall risk and verbalized understanding, call light within reach.

## 2024-12-21 NOTE — DISCHARGE PLANNING
Care Transition Team Discharge Planning    Anticipated Discharge Information  Discharge Disposition: Discharged to home/self care (01)  Discharge Address: [address] [place] [address]  Discharge Contact Phone Number: [phone]    Discharge Plan:  Update from MD that patient is likely medically clear for DC today. Met with patient at bedside and verified demographics. Call to Radha Love with [facility]  where patient lives who reports she will be able to provide transport home at DC. Updated Radha, we are pending home O2 eval to see if patient will require home O2. RNCM informed Radha someone will reach out to her when patient is clear for DC. Dr. Pemberton updated.

## 2024-12-21 NOTE — CARE PLAN
The patient is Stable - Low risk of patient condition declining or worsening    Shift Goals  Clinical Goals: wean oxygen, antibiotic administration, mobility, safety, bowel movement  Patient Goals: comfort, rest, bowel movement  Family Goals: heriberto    Progress made toward(s) clinical / shift goals:    Problem: Knowledge Deficit - Standard  Goal: Patient and family/care givers will demonstrate understanding of plan of care, disease process/condition, diagnostic tests and medications  Outcome: Progressing     Problem: Fall Risk  Goal: Patient will remain free from falls  Outcome: Progressing     Problem: Hemodynamics  Goal: Patient's hemodynamics, fluid balance and neurologic status will be stable or improve  Outcome: Progressing     Problem: Respiratory  Goal: Patient will achieve/maintain optimum respiratory ventilation and gas exchange  Outcome: Progressing     Problem: Nutrition  Goal: Patient's nutritional and fluid intake will be adequate or improve  Outcome: Progressing     Problem: Urinary Elimination  Goal: Establish and maintain regular urinary output  Outcome: Progressing     Problem: Mobility  Goal: Patient's capacity to carry out activities will improve  Outcome: Progressing     Problem: Self Care  Goal: Patient will have the ability to perform ADLs independently or with assistance (bathe, groom, dress, toilet and feed)  Outcome: Progressing       Patient is not progressing towards the following goals:      Problem: Bowel Elimination  Goal: Establish and maintain regular bowel function  Outcome: Not Progressing

## 2024-12-22 NOTE — DISCHARGE SUMMARY
"[facility] Discharge Summary    Attending: Dr. Mccain  Senior Resident: Dr. Roberts  Intern:  Dr. Pemberton    CHIEF COMPLAINT ON ADMISSION  Chief Complaint   Patient presents with    Flu Like Symptoms     Pt BIBA form home for flu like symptoms x4 days and increasing weakness. Pt reports cough, chills and \"creepy feeling\". Last night pt was walking up her stairs and became extremely weak so her caregiver lowered her to the ground. -LOC, -Headstrike, -thinners. Pt found to be saturating at 88% on RA, placed on 2L NC in the field to 95%. Pt A&Ox4, GCS 15.     Weakness       Reason for Admission  Pulmonary emboli found on CTA chest    Admission Date  12/19/2024    CODE STATUS  Full Code    HPI & HOSPITAL COURSE  Per H&P: \"Gena Dykes is a 68 y.o. female with PMHx of remote breast cancer (status post lumpectomy), CAD, prediabetes, schizophrenia, who presented with weakness and shortness of breath.      Patient reports for 4 days she has had increasing weakness, cough, chills, shortness of breath.  She is reports sometimes she feels feverish.  States she is not having chest pain except for when she coughs.      No difficulty breathing with lying down.  She has not noticed lower extremity swelling.  Denies calf pain.  Denies hemoptysis.  She denies any abdominal pain, nausea, vomiting, pain with urination, urgency, frequency.  Denies diarrhea.     Denies prolonged period of sitting or recent travel.  Denies injury to the legs recently.  Denies history of bleeding or clotting.  Denies family history of bleeding or clotting.  Reports she had a colonoscopy last year that was \"normal\" and that she is up-to-date with her mammogram screening every 1 to 2 years.  States she was told she does not have to do any more Pap smears and does not believe she has a history of abnormal Pap smears.  Reports she smoked a pack per day for her whole life until 12 years ago.\"    ED Course: Patient was afebrile with heart rate in the 80s " she did require 2 L of oxygen via nasal cannula for desaturations.  She had a white cell count of 42.7 with left shift.  She had a troponin of 31 and proBNP of 1455.  She had a UA with trace ketones and leukocyte esterase with protein.  Chest x-ray showed pulmonary edema versus infiltrates worse in the right lung base.  A CTA a chest showed bilateral lower lobe subsegmental pulmonary emboli without evidence of right heart strain as well as right lower lobe infarct versus infiltrate. EKG showed low voltage without dynamic ischemic changes.  Patient received ceftriaxone and therapeutic Lovenox in ED.    Hospital Course: Once admitted patient stated that she had some weakness but did not have any shortness of breath or chest pain she also reported auditory hallucinations but said that that is her baseline.  She was continued on home clozapine during admission.    Patient was started on therapeutic apixaban 10 mg twice daily for 7 days with plan to decrease to 5 mg twice a day after.  Pharmacy was consulted and said no need for bridge.  She initially required oxygen 1 to 2 L via nasal cannula, however home O2 eval was performed before patient was discharged and found that she did not require home oxygen.  Her troponin initially increased with peak of 48 but then decreased and was no longer trended.  As there was no evidence of right heart strain, and since patient's hypoxia resolved, echo was not performed.    Patient was started on Augmentin and doxycycline to treat possible community-acquired pneumonia seen on imaging.    Therefore, she is discharged in good and stable condition to group home.    The patient met 2-midnight criteria for an inpatient stay at the time of discharge.    Discharge Date  12/21/24    Physical Exam on Day of Discharge  Physical Exam  Constitutional:       Appearance: Normal appearance.   HENT:      Head: Normocephalic and atraumatic.   Cardiovascular:      Rate and Rhythm: Normal rate and  regular rhythm.   Pulmonary:      Effort: Pulmonary effort is normal.      Breath sounds: Normal breath sounds.   Abdominal:      General: There is no distension.      Palpations: Abdomen is soft.      Tenderness: There is no abdominal tenderness.   Musculoskeletal:         General: No swelling.      Right lower leg: No edema.   Neurological:      General: No focal deficit present.      Mental Status: She is alert.   Psychiatric:         Thought Content: Thought content normal.      Comments: Pt currently denies auditory hallucinations         FOLLOW UP ITEMS POST DISCHARGE  - Follow up with vascular surgery, referral was placed    - Pt will take Apixaban 10mg BID until 12/27 and then switch to 5mg BID  - Follow up with PCP in 1-2 weeks    DISCHARGE DIAGNOSES  Principal Problem:    Multiple subsegmental pulmonary emboli without acute cor pulmonale (HCC) (POA: Yes)  Active Problems:    Paranoid schizophrenia (HCC) (POA: Yes)    Constipation (Chronic) (POA: Yes)    Vasospastic angina (HCC) (POA: Yes)    Prediabetes (POA: Unknown)  Resolved Problems:    * No resolved hospital problems. *      FOLLOW UP  Future Appointments   Date Time Provider Department Center   12/31/2024  2:50 PM [facility] [address]     Tomas Mccray D.O.  [address] [address]  [phone]    Follow up        MEDICATIONS ON DISCHARGE     Medication List        START taking these medications        Instructions   amoxicillin-clavulanate 875-125 MG Tabs  Commonly known as: Augmentin   Take 1 Tablet by mouth every 12 hours for 5 doses.  Dose: 1 Tablet     apixaban 5mg Tabs  Commonly known as: Eliquis   Take 2 tablets by mouth twice a day for 6 days until 12/27 then take 1 tablet by mouth twice a day  Indications: Thromboembolism secondary to Atrial Fibrillation  (Take 2 tablets by mouth twice a day for 6 days until 12/27 then take 1 tablet by mouth twice a day  Indications: Thromboembolism secondary to Atrial  Fibrillation)  Dose: 10 mg     doxycycline monohydrate 100 MG tablet  Commonly known as: Adoxa   Take 1 Tablet by mouth every 12 hours for 5 doses.  Dose: 100 mg            CONTINUE taking these medications        Instructions   Asenapine Maleate 10 MG Subl   Place 1 Tablet under the tongue 2 times a day.  Dose: 1 Tablet     atorvastatin 40 MG Tabs  Commonly known as: Lipitor   TAKE 1 TABLET BY MOUTH EVERY EVENING  Dose: 40 mg     Calcium + Vitamin D3 600-5 MG-MCG Tabs  Generic drug: Calcium Carb-Cholecalciferol   TAKE 1 TABLET BY MOUTH ONCE DAILY  Dose: 1 Tablet     cloZAPine 100 MG Tabs  Commonly known as: Clozaril   Take 100-300 mg by mouth in the morning, at noon, and at bedtime. Take 200 mg in am, 100 mg in the afternoon, 300 mg at night  Dose: 100-300 mg     docusate calcium 240 MG Caps  Commonly known as: Surfak   TAKE 1 CAPSULE BY MOUTH ONCE DAILY  Dose: 240 mg     fish oil 1000 MG Caps capsule   Take 1,000 mg by mouth 2 times a day.  Dose: 1,000 mg     Multivitamin Tabs   TAKE 1 TABLET BY MOUTH ONCE DAILY     polyethylene glycol/lytes Pack  Commonly known as: Miralax   Take 17 g by mouth every day.  Dose: 17 g     verapamil 40 MG Tabs  Commonly known as: Isoptin   TAKE 1 TABLET BY MOUTH TWICE DAILY  Dose: 40 mg              Allergies  Allergies   Allergen Reactions    Azithromycin     Fluphenazine      hallucinations    Haldol [Haloperidol]     Erythromycin      Pt on Clozapine/no erythromycin    Haldol [Haloperidol Lactate]      Does not tolerate       DIET  Orders Placed This Encounter   Procedures    Diet Order Diet: Regular     Standing Status:   Standing     Number of Occurrences:   1     Order Specific Question:   Diet:     Answer:   Regular [1]       ACTIVITY  As tolerated.  Weight bearing as tolerated    CONSULTATIONS  None    PROCEDURES  None    LABORATORY  Lab Results   Component Value Date    SODIUM 140 12/21/2024    POTASSIUM 4.3 12/21/2024    CHLORIDE 105 12/21/2024    CO2 25 12/21/2024     GLUCOSE 89 12/21/2024    BUN 13 12/21/2024    CREATININE 0.66 12/21/2024        Lab Results   Component Value Date    WBC 11.4 (H) 12/21/2024    HEMOGLOBIN 13.7 12/21/2024    HEMATOCRIT 41.3 12/21/2024    PLATELETCT 379 12/21/2024        Total time of the discharge process exceeds 30 minutes.

## 2024-12-22 NOTE — PROGRESS NOTES
Patient arrived to discharge lounge. Discussed discharge paperwork and medications with patient and family. Answered all questions. No home meds to return. Patient escorted out with family via wheelchair, personal belongings in hand.

## 2024-12-31 ENCOUNTER — HOSPITAL ENCOUNTER (OUTPATIENT)
Dept: RADIOLOGY | Facility: MEDICAL CENTER | Age: 69
End: 2024-12-31
Attending: HEALTH CARE PROVIDER
Payer: MEDICARE

## 2024-12-31 DIAGNOSIS — Z12.31 ENCOUNTER FOR SCREENING MAMMOGRAM FOR MALIGNANT NEOPLASM OF BREAST: ICD-10-CM

## 2024-12-31 PROCEDURE — 77067 SCR MAMMO BI INCL CAD: CPT

## 2025-01-03 ENCOUNTER — OFFICE VISIT (OUTPATIENT)
Dept: MEDICAL GROUP | Facility: CLINIC | Age: 70
End: 2025-01-03
Payer: MEDICARE

## 2025-01-03 VITALS
HEART RATE: 86 BPM | OXYGEN SATURATION: 91 % | WEIGHT: 146 LBS | TEMPERATURE: 96.9 F | DIASTOLIC BLOOD PRESSURE: 74 MMHG | HEIGHT: 63 IN | BODY MASS INDEX: 25.87 KG/M2 | SYSTOLIC BLOOD PRESSURE: 106 MMHG

## 2025-01-03 DIAGNOSIS — Z09 HOSPITAL DISCHARGE FOLLOW-UP: ICD-10-CM

## 2025-01-03 DIAGNOSIS — I26.94 MULTIPLE SUBSEGMENTAL PULMONARY EMBOLI WITHOUT ACUTE COR PULMONALE (HCC): ICD-10-CM

## 2025-01-03 PROCEDURE — 3074F SYST BP LT 130 MM HG: CPT | Mod: GC

## 2025-01-03 PROCEDURE — 99213 OFFICE O/P EST LOW 20 MIN: CPT | Mod: GE

## 2025-01-03 PROCEDURE — 3078F DIAST BP <80 MM HG: CPT | Mod: GC

## 2025-01-03 RX ORDER — DOCUSATE CALCIUM 240 MG
240 CAPSULE ORAL DAILY
Qty: 90 CAPSULE | Refills: 3 | Status: SHIPPED | OUTPATIENT
Start: 2025-01-03

## 2025-01-03 RX ORDER — ASPIRIN 81 MG
1 TABLET, DELAYED RELEASE (ENTERIC COATED) ORAL DAILY
Qty: 90 TABLET | Refills: 3 | Status: SHIPPED | OUTPATIENT
Start: 2025-01-03

## 2025-01-03 ASSESSMENT — FIBROSIS 4 INDEX: FIB4 SCORE: 1.42

## 2025-01-03 NOTE — PROGRESS NOTES
Subjective:     CC: Hospital follow-up    HPI:   Gena with schizophrenia who presents today with:    Problem   Multiple Subsegmental Pulmonary Emboli Without Acute Cor Pulmonale (Hcc)    Was recently hospitalized for unprovoked PEs.  She have a history of breast cancer but her most recent mammogram on 12/31/2024 showed no concerning masses.  The patient also had a colonoscopy in 2024 reported by her caretaker which was normal.  She did not have any recent long periods of travel and was not bedbound.  Patient does report a family history of blood clots in her brother who had a DVT last year.     Hospital Discharge Follow-Up    Patient was recently hospitalized from 12/19/24-12/21/24 for an unprovoked PE. She was started on Apixaban and has been taking it since discharge. Patient is now taking 5mg twice a day.           Current Outpatient Medications Ordered in Epic   Medication Sig Dispense Refill    apixaban (ELIQUIS) 5mg Tab Take 1 Tablet by mouth 2 times a day. 180 Tablet 3    Calcium Carb-Cholecalciferol (CALCIUM + VITAMIN D3) 600-5 MG-MCG Tab Take 1 Tablet by mouth every day. 90 Tablet 3    docusate calcium (SURFAK) 240 MG Cap Take 1 Capsule by mouth every day. 90 Capsule 3    atorvastatin (LIPITOR) 40 MG Tab TAKE 1 TABLET BY MOUTH EVERY EVENING 90 Tablet 3    verapamil (ISOPTIN) 40 MG Tab TAKE 1 TABLET BY MOUTH TWICE DAILY 180 Tablet 3    Omega-3 Fatty Acids (FISH OIL) 1000 MG Cap capsule Take 1,000 mg by mouth 2 times a day.      Multiple Vitamin (MULTIVITAMIN) Tab TAKE 1 TABLET BY MOUTH ONCE DAILY (Patient taking differently: Take 1 Tablet by mouth every day.) 30 Tablet 11    Asenapine Maleate 10 MG SL Tab Place 1 Tablet under the tongue 2 times a day.      polyethylene glycol/lytes (MIRALAX) Pack Take 17 g by mouth every day.      clozapine (CLOZARIL) 100 MG TABS Take 100-300 mg by mouth in the morning, at noon, and at bedtime. Take 200 mg in am, 100 mg in the afternoon, 300 mg at night       No current  "[facility]pic-ordered facility-administered medications on file.       ROS:  ROS as per HPI. Otherwise negative.      Objective:     Exam:  /74 (BP Location: Left arm, Patient Position: Sitting, BP Cuff Size: Adult)   Pulse 86   Temp 36.1 °C (96.9 °F) (Temporal)   Ht 1.6 m (5' 3\")   Wt 66.2 kg (146 lb)   LMP 07/03/2000 (Approximate)   SpO2 91%   BMI 25.86 kg/m²  Body mass index is 25.86 kg/m².    Gen: Alert and oriented, No apparent distress.  Lungs: Normal effort, CTA bilaterally, no wheezes, rhonchi, or rales  CV: Regular rate and rhythm. No murmurs, rubs, or gallops.    Assessment & Plan:     69 y.o. female with the following -     Problem List Items Addressed This Visit       Hospital discharge follow-up     Provided patient with additional refills of Eliquis.         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  Patient currently on apixaban which would alter blood work.         Relevant Medications    apixaban (ELIQUIS) 5mg Tab    Other Relevant Orders    Referral to Hematology Oncology         Return if symptoms worsen or fail to improve.        "

## 2025-01-03 NOTE — ASSESSMENT & PLAN NOTE
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  Patient currently on apixaban which would alter blood work.

## 2025-01-21 NOTE — TELEPHONE ENCOUNTER
Received request via: Patient    Was the patient seen in the last year in this department? Yes    Does the patient have an active prescription (recently filled or refills available) for medication(s) requested? No    Pharmacy Name: [facility]    Does the patient have retirement Plus and need 100-day supply? (This applies to ALL medications) Patient does not have SCP

## 2025-01-22 ENCOUNTER — PHARMACY VISIT (OUTPATIENT)
Dept: PHARMACY | Facility: MEDICAL CENTER | Age: 70
End: 2025-01-22
Payer: MEDICARE

## 2025-01-22 ENCOUNTER — TELEPHONE (OUTPATIENT)
Dept: MEDICAL GROUP | Facility: CLINIC | Age: 70
End: 2025-01-22
Payer: MEDICARE

## 2025-01-22 PROCEDURE — RXMED WILLOW AMBULATORY MEDICATION CHARGE

## 2025-02-05 ENCOUNTER — HOSPITAL ENCOUNTER (OUTPATIENT)
Dept: HEMATOLOGY ONCOLOGY | Facility: MEDICAL CENTER | Age: 70
End: 2025-02-05
Attending: INTERNAL MEDICINE
Payer: MEDICARE

## 2025-02-05 ENCOUNTER — HOSPITAL ENCOUNTER (OUTPATIENT)
Dept: LAB | Facility: MEDICAL CENTER | Age: 70
End: 2025-02-05
Attending: INTERNAL MEDICINE
Payer: MEDICARE

## 2025-02-05 VITALS
SYSTOLIC BLOOD PRESSURE: 106 MMHG | BODY MASS INDEX: 26.4 KG/M2 | WEIGHT: 149 LBS | OXYGEN SATURATION: 94 % | HEIGHT: 63 IN | TEMPERATURE: 97.8 F | RESPIRATION RATE: 24 BRPM | DIASTOLIC BLOOD PRESSURE: 70 MMHG | HEART RATE: 92 BPM

## 2025-02-05 DIAGNOSIS — I26.94 MULTIPLE SUBSEGMENTAL PULMONARY EMBOLI WITHOUT ACUTE COR PULMONALE (HCC): ICD-10-CM

## 2025-02-05 LAB
ALBUMIN SERPL BCP-MCNC: 4.2 G/DL (ref 3.2–4.9)
ALBUMIN/GLOB SERPL: 1.2 G/DL
ALP SERPL-CCNC: 86 U/L (ref 30–99)
ALT SERPL-CCNC: 20 U/L (ref 2–50)
ANION GAP SERPL CALC-SCNC: 13 MMOL/L (ref 7–16)
AST SERPL-CCNC: 39 U/L (ref 12–45)
BASOPHILS # BLD AUTO: 0.5 % (ref 0–1.8)
BASOPHILS # BLD: 0.04 K/UL (ref 0–0.12)
BILIRUB SERPL-MCNC: 0.3 MG/DL (ref 0.1–1.5)
BUN SERPL-MCNC: 22 MG/DL (ref 8–22)
CALCIUM ALBUM COR SERPL-MCNC: 10.2 MG/DL (ref 8.5–10.5)
CALCIUM SERPL-MCNC: 10.4 MG/DL (ref 8.5–10.5)
CHLORIDE SERPL-SCNC: 101 MMOL/L (ref 96–112)
CO2 SERPL-SCNC: 25 MMOL/L (ref 20–33)
CREAT SERPL-MCNC: 1.01 MG/DL (ref 0.5–1.4)
EOSINOPHIL # BLD AUTO: 0.01 K/UL (ref 0–0.51)
EOSINOPHIL NFR BLD: 0.1 % (ref 0–6.9)
ERYTHROCYTE [DISTWIDTH] IN BLOOD BY AUTOMATED COUNT: 55.8 FL (ref 35.9–50)
GFR SERPLBLD CREATININE-BSD FMLA CKD-EPI: 61 ML/MIN/1.73 M 2
GLOBULIN SER CALC-MCNC: 3.5 G/DL (ref 1.9–3.5)
GLUCOSE SERPL-MCNC: 77 MG/DL (ref 65–99)
HCT VFR BLD AUTO: 47.6 % (ref 37–47)
HGB BLD-MCNC: 15.7 G/DL (ref 12–16)
IMM GRANULOCYTES # BLD AUTO: 0.03 K/UL (ref 0–0.11)
IMM GRANULOCYTES NFR BLD AUTO: 0.4 % (ref 0–0.9)
LYMPHOCYTES # BLD AUTO: 1.25 K/UL (ref 1–4.8)
LYMPHOCYTES NFR BLD: 16.5 % (ref 22–41)
MCH RBC QN AUTO: 29.6 PG (ref 27–33)
MCHC RBC AUTO-ENTMCNC: 33 G/DL (ref 32.2–35.5)
MCV RBC AUTO: 89.8 FL (ref 81.4–97.8)
MONOCYTES # BLD AUTO: 0.58 K/UL (ref 0–0.85)
MONOCYTES NFR BLD AUTO: 7.6 % (ref 0–13.4)
NEUTROPHILS # BLD AUTO: 5.68 K/UL (ref 1.82–7.42)
NEUTROPHILS NFR BLD: 74.9 % (ref 44–72)
NRBC # BLD AUTO: 0 K/UL
NRBC BLD-RTO: 0 /100 WBC (ref 0–0.2)
PLATELET # BLD AUTO: 418 K/UL (ref 164–446)
PMV BLD AUTO: 10.5 FL (ref 9–12.9)
POTASSIUM SERPL-SCNC: 4.7 MMOL/L (ref 3.6–5.5)
PROT SERPL-MCNC: 7.7 G/DL (ref 6–8.2)
RBC # BLD AUTO: 5.3 M/UL (ref 4.2–5.4)
SODIUM SERPL-SCNC: 139 MMOL/L (ref 135–145)
WBC # BLD AUTO: 7.6 K/UL (ref 4.8–10.8)

## 2025-02-05 PROCEDURE — 85301 ANTITHROMBIN III ANTIGEN: CPT

## 2025-02-05 PROCEDURE — 85306 CLOT INHIBIT PROT S FREE: CPT

## 2025-02-05 PROCEDURE — 85303 CLOT INHIBIT PROT C ACTIVITY: CPT

## 2025-02-05 PROCEDURE — 86147 CARDIOLIPIN ANTIBODY EA IG: CPT

## 2025-02-05 PROCEDURE — 36415 COLL VENOUS BLD VENIPUNCTURE: CPT

## 2025-02-05 PROCEDURE — 81291 MTHFR GENE: CPT

## 2025-02-05 PROCEDURE — 81240 F2 GENE: CPT

## 2025-02-05 PROCEDURE — 99204 OFFICE O/P NEW MOD 45 MIN: CPT | Performed by: INTERNAL MEDICINE

## 2025-02-05 PROCEDURE — 81241 F5 GENE: CPT

## 2025-02-05 PROCEDURE — 99212 OFFICE O/P EST SF 10 MIN: CPT | Performed by: INTERNAL MEDICINE

## 2025-02-05 PROCEDURE — 85025 COMPLETE CBC W/AUTO DIFF WBC: CPT

## 2025-02-05 PROCEDURE — 80053 COMPREHEN METABOLIC PANEL: CPT

## 2025-02-05 ASSESSMENT — ENCOUNTER SYMPTOMS
DEPRESSION: 0
ORTHOPNEA: 0
NECK PAIN: 0
COUGH: 0
HEADACHES: 0
VOMITING: 0
WHEEZING: 0
SHORTNESS OF BREATH: 0
BLURRED VISION: 0
CHILLS: 0
NAUSEA: 0
ABDOMINAL PAIN: 0
PALPITATIONS: 0
SENSORY CHANGE: 0
SORE THROAT: 0
SPUTUM PRODUCTION: 0
DIZZINESS: 0
MEMORY LOSS: 0
TINGLING: 0
FOCAL WEAKNESS: 0
WEIGHT LOSS: 0
FEVER: 0
TREMORS: 0
BRUISES/BLEEDS EASILY: 0
HEARTBURN: 0

## 2025-02-05 ASSESSMENT — PAIN SCALES - GENERAL: PAINLEVEL_OUTOF10: NO PAIN

## 2025-02-05 ASSESSMENT — FIBROSIS 4 INDEX: FIB4 SCORE: 1.42

## 2025-02-05 NOTE — ASSESSMENT & PLAN NOTE
Orders:    ANTICARDIOLIPIN AB IGG,IGM,IGA; Future    ANTITHROMBIN ANTIGEN; Future    FACTOR II DNA ANALYSIS; Future    FACTOR V LEIDEN MUTATION; Future    MTHFR DNA ANALYSIS; Future    PROTEIN C PANEL; Future    PROTEIN S PANEL; Future    CBC WITH DIFFERENTIAL; Future    Comp Metabolic Panel; Future

## 2025-02-05 NOTE — ADDENDUM NOTE
Encounter addended by: Brittany Davis, Med Ass't on: 2/5/2025 11:20 AM   Actions taken: Charge Capture section accepted

## 2025-02-05 NOTE — PROGRESS NOTES
Consult:  Hematology/Oncology      Referring Physician:   Primary Care:  Kourtney Pope M.D.    Diagnosis:     Chief Complaint:  New Patient (PE)      History of Presenting Illness:  Gena Dykes is a 69 y.o. female patient with longstanding schizophrenia resides in a group home for greater than 20 years has some type of primary care provider.  Was seen in the emergency room with a diagnosis of pulmonary embolism.  Patient's discharge plan was to follow-up with hematology.      No past medical history on file.    Past Surgical History:   Procedure Laterality Date    MASS EXCISION GENERAL  2017    Procedure: ;  Surgeon: Brock Colin M.D.;  Location: SURGERY [facility];  Service:     LUMPECTOMY Left        Social History     Tobacco Use    Smoking status: Former     Current packs/day: 0.00     Types: Cigarettes     Quit date: 2011     Years since quittin.1    Smokeless tobacco: Never    Tobacco comments:     smoked for 45 years   Vaping Use    Vaping status: Never Used   Substance Use Topics    Alcohol use: No     Alcohol/week: 0.0 oz    Drug use: No        Family History   Problem Relation Age of Onset    Heart Disease Mother        Allergies as of 2025 - Reviewed 2025   Allergen Reaction Noted    Azithromycin  2014    Fluphenazine  10/21/2014    Haldol [haloperidol]  10/21/2014    Erythromycin  2012    Haldol [haloperidol lactate]  2012         Current Outpatient Medications:     apixaban (ELIQUIS) 5mg Tab, Take 1 Tablet by mouth 2 times a day., Disp: 180 Tablet, Rfl: 0    Calcium Carb-Cholecalciferol (CALCIUM + VITAMIN D3) 600-5 MG-MCG Tab, Take 1 Tablet by mouth every day., Disp: 90 Tablet, Rfl: 3    docusate calcium (SURFAK) 240 MG Cap, Take 1 Capsule by mouth every day., Disp: 90 Capsule, Rfl: 3    atorvastatin (LIPITOR) 40 MG Tab, TAKE 1 TABLET BY MOUTH EVERY EVENING, Disp: 90 Tablet, Rfl: 3    verapamil (ISOPTIN) 40 MG Tab, TAKE 1 TABLET BY  "MOUTH TWICE DAILY, Disp: 180 Tablet, Rfl: 3    Omega-3 Fatty Acids (FISH OIL) 1000 MG Cap capsule, Take 1,000 mg by mouth 2 times a day., Disp: , Rfl:     Multiple Vitamin (MULTIVITAMIN) Tab, TAKE 1 TABLET BY MOUTH ONCE DAILY (Patient taking differently: Take 1 Tablet by mouth every day.), Disp: 30 Tablet, Rfl: 11    Asenapine Maleate 10 MG SL Tab, Place 1 Tablet under the tongue 2 times a day., Disp: , Rfl:     polyethylene glycol/lytes (MIRALAX) Pack, Take 17 g by mouth every day., Disp: , Rfl:     clozapine (CLOZARIL) 100 MG TABS, Take 100-300 mg by mouth in the morning, at noon, and at bedtime. Take 200 mg in am, 100 mg in the afternoon, 300 mg at night, Disp: , Rfl:     Review of Systems:  Review of Systems   Constitutional:  Negative for chills, fever, malaise/fatigue and weight loss.   HENT:  Negative for congestion, ear pain, nosebleeds and sore throat.    Eyes:  Negative for blurred vision.   Respiratory:  Negative for cough, sputum production, shortness of breath and wheezing.    Cardiovascular:  Negative for chest pain, palpitations, orthopnea and leg swelling.   Gastrointestinal:  Negative for abdominal pain, heartburn, nausea and vomiting.   Genitourinary:  Negative for dysuria, frequency and urgency.   Musculoskeletal:  Negative for neck pain.   Neurological:  Negative for dizziness, tingling, tremors, sensory change, focal weakness and headaches.   Endo/Heme/Allergies:  Does not bruise/bleed easily.   Psychiatric/Behavioral:  Negative for depression, memory loss and suicidal ideas.    All other systems reviewed and are negative.         Physical Exam:  Vitals:    02/05/25 1056   BP: 106/70   BP Location: Left arm   Patient Position: Sitting   BP Cuff Size: Adult   Pulse: 92   Resp: (!) 24   Temp: 36.6 °C (97.8 °F)   TempSrc: Temporal   SpO2: 94%   Weight: 67.6 kg (149 lb)   Height: 1.6 m (5' 2.99\")               Physical Exam  Constitutional:       General: She is not in acute distress.  HENT:      " Head: Normocephalic.   Eyes:      Conjunctiva/sclera: Conjunctivae normal.   Cardiovascular:      Rate and Rhythm: Normal rate and regular rhythm.      Heart sounds: Normal heart sounds.   Pulmonary:      Effort: Pulmonary effort is normal.      Breath sounds: Normal breath sounds.   Abdominal:      General: Bowel sounds are normal.      Palpations: Abdomen is soft.   Musculoskeletal:         General: Normal range of motion.   Lymphadenopathy:      Cervical: No cervical adenopathy.   Skin:     General: Skin is dry.   Neurological:      General: No focal deficit present.      Mental Status: She is oriented to person, place, and time.   Psychiatric:         Thought Content: Thought content normal.          Depression Screening    Little interest or pleasure in doing things?      Feeling down, depressed , or hopeless?     Trouble falling or staying asleep, or sleeping too much?      Feeling tired or having little energy?      Poor appetite or overeating?      Feeling bad about yourself - or that you are a failure or have let yourself or your family down?     Trouble concentrating on things, such as reading the newspaper or watching television?     Moving or speaking so slowly that other people could have noticed.  Or the opposite - being so fidgety or restless that you have been moving around a lot more than usual?      Thoughts that you would be better off dead, or of hurting yourself?      Patient Health Questionnaire Score:       If depressive symptoms identified deferred to follow up visit unless specifically addressed in assesment and plan.    Labs:  No visits with results within 1 Month(s) from this visit.   Latest known visit with results is:   Admission on 12/19/2024, Discharged on 12/21/2024   Component Date Value Ref Range Status    WBC 12/19/2024 42.7 (H)  4.8 - 10.8 K/uL Final    RBC 12/19/2024 5.01  4.20 - 5.40 M/uL Final    Hemoglobin 12/19/2024 14.9  12.0 - 16.0 g/dL Final    Hematocrit 12/19/2024 44.2   37.0 - 47.0 % Final    MCV 12/19/2024 88.2  81.4 - 97.8 fL Final    MCH 12/19/2024 29.7  27.0 - 33.0 pg Final    MCHC 12/19/2024 33.7  32.2 - 35.5 g/dL Final    RDW 12/19/2024 52.1 (H)  35.9 - 50.0 fL Final    Platelet Count 12/19/2024 328  164 - 446 K/uL Final    MPV 12/19/2024 9.9  9.0 - 12.9 fL Final    Neutrophils-Polys 12/19/2024 92.40 (H)  44.00 - 72.00 % Final    Lymphocytes 12/19/2024 0.00 (L)  22.00 - 41.00 % Final    Monocytes 12/19/2024 1.70  0.00 - 13.40 % Final    Eosinophils 12/19/2024 0.00  0.00 - 6.90 % Final    Basophils 12/19/2024 0.00  0.00 - 1.80 % Final    Nucleated RBC 12/19/2024 0.00  0.00 - 0.20 /100 WBC Final    Neutrophils (Absolute) 12/19/2024 41.25 (H)  1.82 - 7.42 K/uL Final    Includes immature neutrophils, if present.    Lymphs (Absolute) 12/19/2024 0.00 (L)  1.00 - 4.80 K/uL Final    Monos (Absolute) 12/19/2024 0.73  0.00 - 0.85 K/uL Final    Eos (Absolute) 12/19/2024 0.00  0.00 - 0.51 K/uL Final    Baso (Absolute) 12/19/2024 0.00  0.00 - 0.12 K/uL Final    NRBC (Absolute) 12/19/2024 0.00  K/uL Final    Anisocytosis 12/19/2024 1+   Final    Sodium 12/19/2024 135  135 - 145 mmol/L Final    Potassium 12/19/2024 3.9  3.6 - 5.5 mmol/L Final    Comment: The hemolysis index of the specimen exceeds the allowed tolerance for the  test.  Result may be affected.  Specimen recollection is recommended to  confirm the result.      Chloride 12/19/2024 101  96 - 112 mmol/L Final    Co2 12/19/2024 21  20 - 33 mmol/L Final    Anion Gap 12/19/2024 13.0  7.0 - 16.0 Final    Glucose 12/19/2024 166 (H)  65 - 99 mg/dL Final    Bun 12/19/2024 14  8 - 22 mg/dL Final    Creatinine 12/19/2024 0.89  0.50 - 1.40 mg/dL Final    Calcium 12/19/2024 9.1  8.5 - 10.5 mg/dL Final    Correct Calcium 12/19/2024 9.6  8.5 - 10.5 mg/dL Final    AST(SGOT) 12/19/2024 21  12 - 45 U/L Final    ALT(SGPT) 12/19/2024 7  2 - 50 U/L Final    Alkaline Phosphatase 12/19/2024 99  30 - 99 U/L Final    Total Bilirubin 12/19/2024 0.5  0.1  - 1.5 mg/dL Final    Albumin 12/19/2024 3.4  3.2 - 4.9 g/dL Final    Total Protein 12/19/2024 7.0  6.0 - 8.2 g/dL Final    Globulin 12/19/2024 3.6 (H)  1.9 - 3.5 g/dL Final    A-G Ratio 12/19/2024 0.9  g/dL Final    Troponin T 12/19/2024 31 (H)  6 - 19 ng/L Final    Comment: Biotin intake of greater than 5 mg per day may interfere with  troponin levels, causing false low values.    The Ultra High Sensitivity Troponin T test has a reference range  for positive troponins that follows the recommendation of ACC for  the 99th percentile reference population.      Color 12/19/2024 Dark Yellow   Final    Character 12/19/2024 Clear   Final    Specific Gravity 12/19/2024 1.023  <1.035 Final    Ph 12/19/2024 5.5  5.0 - 8.0 Final    Glucose 12/19/2024 Negative  Negative mg/dL Final    Ketones 12/19/2024 Trace (A)  Negative mg/dL Final    Protein 12/19/2024 30 (A)  Negative mg/dL Final    Bilirubin 12/19/2024 Negative  Negative Final    Urobilinogen, Urine 12/19/2024 1.0  <=1.0 EU/dL Final    Nitrite 12/19/2024 Negative  Negative Final    Leukocyte Esterase 12/19/2024 Trace (A)  Negative Final    Occult Blood 12/19/2024 Negative  Negative Final    Micro Urine Req 12/19/2024 Microscopic   Final    POC Influenza A RNA, PCR 12/19/2024 Negative  Negative Final    POC Influenza B RNA, PCR 12/19/2024 Negative  Negative Final    POC RSV, by PCR 12/19/2024 Negative  Negative Final    POC SARS-CoV-2, PCR 12/19/2024 NotDetected  NotDetected Final    Comment: [facility] providers: PLEASE REFER TO DE-ESCALATION AND RETESTING PROTOCOL  on [email or web address]    **The [facility] GeneXpert Xpress SARS-CoV-2 RT-PCR Test has been made  available for use under the Emergency Use Authorization (EUA) only.      GFR (CKD-EPI) 12/19/2024 71  >60 mL/min/1.73 m 2 Final    Comment: Estimated Glomerular Filtration Rate is calculated using  race neutral CKD-EPI 2021 equation per NKF-ASN recommendations.      D-Dimer 12/19/2024 1.13 (H)  0.00 - 0.50 ug/mL (FEU)  Final    Comment: A negative D-Dimer result when combined with a clinical  assessment of low probability has been shown to have a high  negative predictive value for DVT or PE.    This assay should not be used independently to exclude or  diagnose DVT or Pulmonary Embolism.    This test methodology does not differentiate between DVT and  PE when an elevation in results is demonstrated.      Bands-Stabs 12/19/2024 4.20  0.00 - 10.00 % Final    Metamyelocytes 12/19/2024 1.70  % Final    Manual Diff Status 12/19/2024 PERFORMED   Final    Peripheral Smear Review 12/19/2024 see below   Final    Comment: Due to instrument suspect flags, further review of peripheral smear is  indicated on this patient sample. This review may or may not result in  abnormal findings.      Plt Estimation 12/19/2024 Normal   Final    RBC Morphology 12/19/2024 Present   Final    Toxic Gran 12/19/2024 Moderate   Final    NT-proBNP 12/19/2024 1455 (H)  0 - 125 pg/mL Final    WBC 12/19/2024 0-2  /hpf Final    Comment: Female  <12 Yr 0-2  >12 Yr 0-5  Male   0-2      RBC 12/19/2024 0-2  0 - 2 /hpf Final    Bacteria 12/19/2024 None Seen  None /hpf Final    Epithelial Cells 12/19/2024 0-2  0 - 5 /hpf Final    Please note new reference range effective 10/23/2024.    Urine Casts 12/19/2024 0-2  0 - 2 /lpf Final    Troponin T 12/19/2024 35 (H)  6 - 19 ng/L Final    Comment: Biotin intake of greater than 5 mg per day may interfere with  troponin levels, causing false low values.    The Ultra High Sensitivity Troponin T test has a reference range  for positive troponins that follows the recommendation of ACC for  the 99th percentile reference population.      Report 12/19/2024    Preliminary                    Value:[facility] Emergency [facility].    Test Date:  2024-12-19  Pt Name:    ANDRES BANUELOS               Department: ER  MRN:        [id number]                      Room:        26  Gender:     Female                       Technician:  [id number]  :        [id number]                   Requested By:BONI SIMMS  Order #:    [id number]                    Reading MD:    Measurements  Intervals                                Axis  Rate:       89                           P:          63  VT:         150                          QRS:        44  QRSD:       93                           T:          62  QT:         365  QTc:        445    Interpretive Statements  Sinus rhythm  Low voltage, precordial leads  Probable anteroseptal infarct, old  Compared to ECG 10/26/2022 13:46:26  Myocardial infarct finding now present      Troponin T 2024 44 (H)  6 - 19 ng/L Final    Comment: Biotin intake of greater than 5 mg per day may interfere with  troponin levels, causing false low values.    The Ultra High Sensitivity Troponin T test has a reference range  for positive troponins that follows the recommendation of ACC for  the 99th percentile reference population.      Troponin T 2024 48 (H)  6 - 19 ng/L Final    Comment: Biotin intake of greater than 5 mg per day may interfere with  troponin levels, causing false low values.    The Ultra High Sensitivity Troponin T test has a reference range  for positive troponins that follows the recommendation of ACC for  the 99th percentile reference population.      Troponin T 2024 39 (H)  6 - 19 ng/L Final    Comment: Biotin intake of greater than 5 mg per day may interfere with  troponin levels, causing false low values.    The Ultra High Sensitivity Troponin T test has a reference range  for positive troponins that follows the recommendation of ACC for  the 99th percentile reference population.      Sodium 2024 135  135 - 145 mmol/L Final    Potassium 2024 4.0  3.6 - 5.5 mmol/L Final    Chloride 2024 101  96 - 112 mmol/L Final    Co2 2024 25  20 - 33 mmol/L Final    Glucose 2024 104 (H)  65 - 99 mg/dL Final    Bun 2024 14  8 - 22 mg/dL Final    Creatinine 2024 0.69   0.50 - 1.40 mg/dL Final    Calcium 12/20/2024 8.8  8.5 - 10.5 mg/dL Final    Anion Gap 12/20/2024 9.0  7.0 - 16.0 Final    WBC 12/20/2024 25.8 (H)  4.8 - 10.8 K/uL Final    RBC 12/20/2024 4.53  4.20 - 5.40 M/uL Final    Hemoglobin 12/20/2024 13.4  12.0 - 16.0 g/dL Final    Hematocrit 12/20/2024 41.5  37.0 - 47.0 % Final    MCV 12/20/2024 91.6  81.4 - 97.8 fL Final    MCH 12/20/2024 29.6  27.0 - 33.0 pg Final    MCHC 12/20/2024 32.3  32.2 - 35.5 g/dL Final    RDW 12/20/2024 56.3 (H)  35.9 - 50.0 fL Final    Platelet Count 12/20/2024 313  164 - 446 K/uL Final    MPV 12/20/2024 10.6  9.0 - 12.9 fL Final    Neutrophils-Polys 12/20/2024 88.10 (H)  44.00 - 72.00 % Final    Lymphocytes 12/20/2024 4.20 (L)  22.00 - 41.00 % Final    Monocytes 12/20/2024 5.20  0.00 - 13.40 % Final    Eosinophils 12/20/2024 0.40  0.00 - 6.90 % Final    Basophils 12/20/2024 0.40  0.00 - 1.80 % Final    Immature Granulocytes 12/20/2024 1.70 (H)  0.00 - 0.90 % Final    Nucleated RBC 12/20/2024 0.00  0.00 - 0.20 /100 WBC Final    Neutrophils (Absolute) 12/20/2024 22.64 (H)  1.82 - 7.42 K/uL Final    Includes immature neutrophils, if present.    Lymphs (Absolute) 12/20/2024 1.09  1.00 - 4.80 K/uL Final    Monos (Absolute) 12/20/2024 1.35 (H)  0.00 - 0.85 K/uL Final    Eos (Absolute) 12/20/2024 0.11  0.00 - 0.51 K/uL Final    Baso (Absolute) 12/20/2024 0.11  0.00 - 0.12 K/uL Final    Immature Granulocytes (abs) 12/20/2024 0.45 (H)  0.00 - 0.11 K/uL Final    NRBC (Absolute) 12/20/2024 0.00  K/uL Final    GFR (CKD-EPI) 12/20/2024 94  >60 mL/min/1.73 m 2 Final    Comment: Estimated Glomerular Filtration Rate is calculated using  race neutral CKD-EPI 2021 equation per NKF-ASN recommendations.      WBC 12/21/2024 11.4 (H)  4.8 - 10.8 K/uL Final    RBC 12/21/2024 4.59  4.20 - 5.40 M/uL Final    Hemoglobin 12/21/2024 13.7  12.0 - 16.0 g/dL Final    Hematocrit 12/21/2024 41.3  37.0 - 47.0 % Final    MCV 12/21/2024 90.0  81.4 - 97.8 fL Final    MCH  12/21/2024 29.8  27.0 - 33.0 pg Final    MCHC 12/21/2024 33.2  32.2 - 35.5 g/dL Final    RDW 12/21/2024 54.7 (H)  35.9 - 50.0 fL Final    Platelet Count 12/21/2024 379  164 - 446 K/uL Final    MPV 12/21/2024 10.3  9.0 - 12.9 fL Final    Neutrophils-Polys 12/21/2024 82.10 (H)  44.00 - 72.00 % Final    Lymphocytes 12/21/2024 8.20 (L)  22.00 - 41.00 % Final    Monocytes 12/21/2024 6.70  0.00 - 13.40 % Final    Eosinophils 12/21/2024 1.00  0.00 - 6.90 % Final    Basophils 12/21/2024 0.40  0.00 - 1.80 % Final    Immature Granulocytes 12/21/2024 1.60 (H)  0.00 - 0.90 % Final    Nucleated RBC 12/21/2024 0.00  0.00 - 0.20 /100 WBC Final    Neutrophils (Absolute) 12/21/2024 9.36 (H)  1.82 - 7.42 K/uL Final    Includes immature neutrophils, if present.    Lymphs (Absolute) 12/21/2024 0.94 (L)  1.00 - 4.80 K/uL Final    Monos (Absolute) 12/21/2024 0.76  0.00 - 0.85 K/uL Final    Eos (Absolute) 12/21/2024 0.11  0.00 - 0.51 K/uL Final    Baso (Absolute) 12/21/2024 0.05  0.00 - 0.12 K/uL Final    Immature Granulocytes (abs) 12/21/2024 0.18 (H)  0.00 - 0.11 K/uL Final    NRBC (Absolute) 12/21/2024 0.00  K/uL Final    Sodium 12/21/2024 140  135 - 145 mmol/L Final    Potassium 12/21/2024 4.3  3.6 - 5.5 mmol/L Final    Chloride 12/21/2024 105  96 - 112 mmol/L Final    Co2 12/21/2024 25  20 - 33 mmol/L Final    Glucose 12/21/2024 89  65 - 99 mg/dL Final    Bun 12/21/2024 13  8 - 22 mg/dL Final    Creatinine 12/21/2024 0.66  0.50 - 1.40 mg/dL Final    Calcium 12/21/2024 8.8  8.5 - 10.5 mg/dL Final    Anion Gap 12/21/2024 10.0  7.0 - 16.0 Final    GFR (CKD-EPI) 12/21/2024 96  >60 mL/min/1.73 m 2 Final    Comment: Estimated Glomerular Filtration Rate is calculated using  race neutral CKD-EPI 2021 equation per NKF-ASN recommendations.         Imaging:   All listed images below have been independently reviewed by me. I agree with the findings as summarized below:    No results found.     Pathology:      Assessment & Plan:  Assessment &  Plan  Multiple subsegmental pulmonary emboli without acute cor pulmonale (HCC)    Orders:    ANTICARDIOLIPIN AB IGG,IGM,IGA; Future    ANTITHROMBIN ANTIGEN; Future    FACTOR II DNA ANALYSIS; Future    FACTOR V LEIDEN MUTATION; Future    MTHFR DNA ANALYSIS; Future    PROTEIN C PANEL; Future    PROTEIN S PANEL; Future    CBC WITH DIFFERENTIAL; Future    Comp Metabolic Panel; Future        69 y.o. female patient with longstanding schizophrenia resides in a group home for greater than 20 years has some type of primary care provider.  Was seen in the emergency room with a diagnosis of pulmonary embolism.  Patient's discharge plan was to follow-up with hematology.  Patient currently on Eliquis 5 mg twice daily.  Will send off hypercoag work.  Return in 1 month for results.    Any questions and concerns raised by the patient were answered to the best of my ability. Thank you for allowing me to participate in the care for this patient. Please feel free to contact me for any questions or concerns.     Adam Pavon M.D.

## 2025-02-07 LAB
AT III AG ACT/NOR PPP IA: 113 % (ref 82–136)
CARDIOLIPIN IGA SER IA-ACNC: <10 APL
CARDIOLIPIN IGG SER IA-ACNC: <10 GPL
CARDIOLIPIN IGM SER IA-ACNC: 29 MPL
PROT C ACT/NOR PPP: 179 % (ref 83–168)
PROT S FREE AG ACT/NOR PPP IA: 90 % (ref 55–123)

## 2025-02-07 RX ORDER — ASPIRIN 81 MG
1 TABLET, DELAYED RELEASE (ENTERIC COATED) ORAL DAILY
Qty: 90 TABLET | Refills: 3 | Status: SHIPPED | OUTPATIENT
Start: 2025-02-07

## 2025-02-07 NOTE — TELEPHONE ENCOUNTER
Received request via: Patient    Was the patient seen in the last year in this department? Yes    Does the patient have an active prescription (recently filled or refills available) for medication(s) requested? No    Pharmacy Name:   [facility] - [place], [place] - [address]       Does the patient have MCFP Plus and need 100-day supply? (This applies to ALL medications) Patient does not have SCP

## 2025-02-09 LAB
MTHFR C.1298A>C GENO BLD/T: NEGATIVE
MTHFR C.677C>T GENO BLD/T: NORMAL
MTHFR GENE MUT ANL BLD/T: NORMAL

## 2025-02-10 LAB — F5 P.R506Q BLD/T QL: ABNORMAL

## 2025-02-11 LAB — F2 C.20210G>A GENO BLD/T: NEGATIVE

## 2025-02-27 DIAGNOSIS — E78.5 DYSLIPIDEMIA: ICD-10-CM

## 2025-02-27 DIAGNOSIS — I21.9 MYOCARDIAL INFARCTION WITH NONOBSTRUCTIVE CORONARY ARTERIES (HCC): ICD-10-CM

## 2025-02-27 RX ORDER — ATORVASTATIN CALCIUM 40 MG/1
40 TABLET, FILM COATED ORAL EVERY EVENING
Qty: 90 TABLET | Refills: 2 | Status: SHIPPED | OUTPATIENT
Start: 2025-02-27

## 2025-03-05 ENCOUNTER — HOSPITAL ENCOUNTER (OUTPATIENT)
Dept: LAB | Facility: MEDICAL CENTER | Age: 70
End: 2025-03-05
Attending: PHYSICIAN ASSISTANT
Payer: MEDICARE

## 2025-03-05 ENCOUNTER — HOSPITAL ENCOUNTER (OUTPATIENT)
Dept: HEMATOLOGY ONCOLOGY | Facility: MEDICAL CENTER | Age: 70
End: 2025-03-05
Attending: PHYSICIAN ASSISTANT
Payer: MEDICARE

## 2025-03-05 VITALS
HEART RATE: 94 BPM | HEIGHT: 63 IN | RESPIRATION RATE: 15 BRPM | BODY MASS INDEX: 26.75 KG/M2 | WEIGHT: 151 LBS | DIASTOLIC BLOOD PRESSURE: 70 MMHG | OXYGEN SATURATION: 92 % | SYSTOLIC BLOOD PRESSURE: 117 MMHG

## 2025-03-05 DIAGNOSIS — Z09 ENCOUNTER FOR HEMATOLOGY FOLLOW-UP: ICD-10-CM

## 2025-03-05 DIAGNOSIS — I26.94 MULTIPLE SUBSEGMENTAL PULMONARY EMBOLI WITHOUT ACUTE COR PULMONALE (HCC): ICD-10-CM

## 2025-03-05 DIAGNOSIS — D68.51 HETEROZYGOUS FACTOR V LEIDEN MUTATION (HCC): ICD-10-CM

## 2025-03-05 DIAGNOSIS — C21.0 ANAL SQUAMOUS CELL CARCINOMA (HCC): ICD-10-CM

## 2025-03-05 DIAGNOSIS — D05.92 CARCINOMA IN SITU OF LEFT BREAST, UNSPECIFIED TYPE: ICD-10-CM

## 2025-03-05 PROCEDURE — 85730 THROMBOPLASTIN TIME PARTIAL: CPT | Mod: 91

## 2025-03-05 PROCEDURE — 84520 ASSAY OF UREA NITROGEN: CPT

## 2025-03-05 PROCEDURE — 99214 OFFICE O/P EST MOD 30 MIN: CPT | Performed by: PHYSICIAN ASSISTANT

## 2025-03-05 PROCEDURE — 36415 COLL VENOUS BLD VENIPUNCTURE: CPT

## 2025-03-05 PROCEDURE — 85613 RUSSELL VIPER VENOM DILUTED: CPT | Mod: 91

## 2025-03-05 PROCEDURE — 86146 BETA-2 GLYCOPROTEIN ANTIBODY: CPT | Mod: 91

## 2025-03-05 PROCEDURE — 85610 PROTHROMBIN TIME: CPT

## 2025-03-05 PROCEDURE — 85670 THROMBIN TIME PLASMA: CPT

## 2025-03-05 PROCEDURE — 82565 ASSAY OF CREATININE: CPT

## 2025-03-05 PROCEDURE — 85598 HEXAGNAL PHOSPH PLTLT NEUTRL: CPT

## 2025-03-05 PROCEDURE — 99212 OFFICE O/P EST SF 10 MIN: CPT | Performed by: PHYSICIAN ASSISTANT

## 2025-03-05 PROCEDURE — 85520 HEPARIN ASSAY: CPT

## 2025-03-05 ASSESSMENT — ENCOUNTER SYMPTOMS
VOMITING: 0
DIARRHEA: 0
COUGH: 0
BLURRED VISION: 0
ABDOMINAL PAIN: 0
SPUTUM PRODUCTION: 0
DOUBLE VISION: 0
BLOOD IN STOOL: 0
BRUISES/BLEEDS EASILY: 0
DIAPHORESIS: 0
CHILLS: 0
FEVER: 0
WEIGHT LOSS: 0
SHORTNESS OF BREATH: 0
WHEEZING: 0
PALPITATIONS: 0
CONSTIPATION: 0
ORTHOPNEA: 0
SENSORY CHANGE: 0
HEADACHES: 0
DIZZINESS: 0
HEMOPTYSIS: 0
MYALGIAS: 0
TINGLING: 0
NAUSEA: 0

## 2025-03-05 ASSESSMENT — FIBROSIS 4 INDEX: FIB4 SCORE: 1.42

## 2025-03-05 NOTE — PROGRESS NOTES
Follow Up Note:  Hematology/Oncology    Current Diagnosis : bilateral PE  Date of Diagnosis: dec 2024    Chief Complaint: Patient seen today for evaluation and ongoing management of thrombophilia workup in setting of PE.     Care Team:   Kourtney Pope M.D.    Oncology History of Presenting Illness:   Gena is a 69-year-old female with history of schizophrenia, who resides in a group home for 20+ years.  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  Review of her results; PET scan 2019 demonstrates mild nonspecific activity in the anus without a discrete mass, small fluid collections in the groin bilaterally with mild increased metabolic activity.  She underwent biopsy in 2017 and multiple nodes in bilateral groins which were negative. Fx Hx of breast CA (great aunt), prostate CA (uncle). Pt's brother denies any known history of hematologic or autoimmune conditions in the family.   Cancer screening:   -Mammogram 12- within normal limits, next due December 2025  -Colonoscopy 2024, within normal limits (per brothers report)     In December 2024 a caregiver found her to be extremely weak, complaining of flulike symptoms x 4 days as well as progressive weakness and cough.  Was diagnosed with pulmonary embolism (bilateral lower lobe subsegmental pulmonary emboli without evidence of right heart strain) in the ED and started on Eliquis, referred to hematology for further mgmt. [place] to be unprovoked.   Dr Pavon evaluated pt in Feb 2025 & ordered a thrombophilia workup which revealed heterozygous for factor V Leiden mutation, elevated protein C (179), elevated anticardiolipin IgM (29).     Current Treatment: eliquis 5mg BID since Dec 2024    Interval History Bettina REBOLLEDO:  Gena returns to clinic accompanied by her brother who provides some of her history and assists with her  care (in addition to her group home).  Gena tells me that she is feeling well tries to walk 3 times a day which is her usual, denies any shortness of breath, swelling or discoloration in the extremities.  She occasionally feels a chest discomfort which is intermittent and mild and resolves within a few seconds.      Allergies as of 03/05/2025 - Reviewed 02/05/2025   Allergen Reaction Noted    Azithromycin  11/20/2014    Fluphenazine  10/21/2014    Haldol [haloperidol]  10/21/2014    Erythromycin  06/14/2012    Haldol [haloperidol lactate]  06/14/2012       Current Outpatient Medications:     atorvastatin (LIPITOR) 40 MG Tab, TAKE 1 TABLET BY MOUTH EVERY EVENING, Disp: 90 Tablet, Rfl: 2    Calcium Carb-Cholecalciferol (CALCIUM + VITAMIN D3) 600-5 MG-MCG Tab, Take 1 Tablet by mouth every day., Disp: 90 Tablet, Rfl: 3    apixaban (ELIQUIS) 5mg Tab, Take 1 Tablet by mouth 2 times a day., Disp: 180 Tablet, Rfl: 0    docusate calcium (SURFAK) 240 MG Cap, Take 1 Capsule by mouth every day., Disp: 90 Capsule, Rfl: 3    verapamil (ISOPTIN) 40 MG Tab, TAKE 1 TABLET BY MOUTH TWICE DAILY, Disp: 180 Tablet, Rfl: 3    Omega-3 Fatty Acids (FISH OIL) 1000 MG Cap capsule, Take 1,000 mg by mouth 2 times a day., Disp: , Rfl:     Multiple Vitamin (MULTIVITAMIN) Tab, TAKE 1 TABLET BY MOUTH ONCE DAILY (Patient taking differently: Take 1 Tablet by mouth every day.), Disp: 30 Tablet, Rfl: 11    Asenapine Maleate 10 MG SL Tab, Place 1 Tablet under the tongue 2 times a day., Disp: , Rfl:     polyethylene glycol/lytes (MIRALAX) Pack, Take 17 g by mouth every day., Disp: , Rfl:     clozapine (CLOZARIL) 100 MG TABS, Take 100-300 mg by mouth in the morning, at noon, and at bedtime. Take 200 mg in am, 100 mg in the afternoon, 300 mg at night, Disp: , Rfl:   Review of Systems:  Review of Systems   Constitutional:  Negative for chills, diaphoresis, fever, malaise/fatigue and weight loss.   HENT:  Negative for tinnitus.    Eyes:  Negative for  "blurred vision and double vision.   Respiratory:  Negative for cough, hemoptysis, sputum production, shortness of breath and wheezing.    Cardiovascular:  Positive for chest pain (see HPI). Negative for palpitations, orthopnea and leg swelling.   Gastrointestinal:  Negative for abdominal pain, blood in stool, constipation, diarrhea, nausea and vomiting.   Genitourinary:  Negative for dysuria and hematuria.   Musculoskeletal:  Negative for myalgias.   Skin:  Negative for rash.   Neurological:  Negative for dizziness, tingling, sensory change and headaches.   Endo/Heme/Allergies:  Does not bruise/bleed easily.     Physical Exam:  Vitals:    03/05/25 1033   BP: 117/70   BP Location: Right arm   Patient Position: Sitting   BP Cuff Size: Adult   Pulse: 94   Resp: 15   TempSrc: Temporal   SpO2: 92%   Weight: 68.5 kg (151 lb)   Height: 1.6 m (5' 2.99\")     Physical Exam  Constitutional:       Appearance: Normal appearance.   Cardiovascular:      Rate and Rhythm: Normal rate and regular rhythm.      Heart sounds: Normal heart sounds.   Pulmonary:      Effort: Pulmonary effort is normal.      Breath sounds: Normal breath sounds.   Abdominal:      General: Abdomen is flat. Bowel sounds are normal.      Palpations: Abdomen is soft.   Neurological:      Mental Status: She is alert and oriented to person, place, and time.   Psychiatric:         Mood and Affect: Affect is blunt.         Cognition and Memory: Memory is impaired.      Comments: Per brother, pt has impaired cognition at baseline (\"unable to fully understand her condition or make decisions\") but pt is cooperative and answers questions appropriately to the best of her knowledge       Labs: reviewed with pt today   Latest Reference Range & Units 02/05/25 11:27   WBC 4.8 - 10.8 K/uL 7.6   RBC 4.20 - 5.40 M/uL 5.30   Hemoglobin 12.0 - 16.0 g/dL 15.7   Hematocrit 37.0 - 47.0 % 47.6 (H)   MCV 81.4 - 97.8 fL 89.8   MCH 27.0 - 33.0 pg 29.6   MCHC 32.2 - 35.5 g/dL 33.0   RDW " 35.9 - 50.0 fL 55.8 (H)   Platelet Count 164 - 446 K/uL 418   MPV 9.0 - 12.9 fL 10.5   Neutrophils-Polys 44.00 - 72.00 % 74.90 (H)   Neutrophils (Absolute) 1.82 - 7.42 K/uL 5.68   Lymphocytes 22.00 - 41.00 % 16.50 (L)   Lymphs (Absolute) 1.00 - 4.80 K/uL 1.25   Monocytes 0.00 - 13.40 % 7.60   Monos (Absolute) 0.00 - 0.85 K/uL 0.58   Eosinophils 0.00 - 6.90 % 0.10   Eos (Absolute) 0.00 - 0.51 K/uL 0.01   Basophils 0.00 - 1.80 % 0.50   Baso (Absolute) 0.00 - 0.12 K/uL 0.04   Immature Granulocytes 0.00 - 0.90 % 0.40   Immature Granulocytes (abs) 0.00 - 0.11 K/uL 0.03   Nucleated RBC 0.00 - 0.20 /100 WBC 0.00   NRBC (Absolute) K/uL 0.00   Sodium 135 - 145 mmol/L 139   Potassium 3.6 - 5.5 mmol/L 4.7   Chloride 96 - 112 mmol/L 101   Co2 20 - 33 mmol/L 25   Anion Gap 7.0 - 16.0  13.0   Glucose 65 - 99 mg/dL 77   Bun 8 - 22 mg/dL 22   Creatinine 0.50 - 1.40 mg/dL 1.01   GFR (CKD-EPI) >60 mL/min/1.73 m 2 61   Calcium 8.5 - 10.5 mg/dL 10.4   Correct Calcium 8.5 - 10.5 mg/dL 10.2   AST(SGOT) 12 - 45 U/L 39   ALT(SGPT) 2 - 50 U/L 20   Alkaline Phosphatase 30 - 99 U/L 86   Total Bilirubin 0.1 - 1.5 mg/dL 0.3   Albumin 3.2 - 4.9 g/dL 4.2   Total Protein 6.0 - 8.2 g/dL 7.7   Globulin 1.9 - 3.5 g/dL 3.5   A-G Ratio g/dL 1.2   Factor Ii Dna Analysis Pt Gene  Negative   V Leiden Factor  Heterozygous !   Protein C Functional 83 - 168 % 179 (H)   Protein S, Free Ag 55 - 123 % 90   Antithrombin III Ag Immuno 82 - 136 % 113   Additional Information  See Note   Anti-Cardiolipin Ab Iga <=11 APL <10   Anti-Cardiolipin Ab Igm <=12 MPL 29 (H)   Anti-Cariolipin Ab Igg <=14 GPL <10   MTHFR Variant: c.1286A>C  Negative   MTHFR Variant: c.665C>T  Homozygous       Assessment & Plan:  1. Multiple subsegmental pulmonary emboli without acute cor pulmonale (HCC)  LUPUS ANTICOAGULANT    BETA-2 GLYCOPROTEIN AB,IGG,IGM    CT-CHEST,ABDOMEN,PELVIS WITH    ESTIMATED GFR    BUN+CREAT      2. Encounter for hematology follow-up  ESTIMATED GFR    BUN+CREAT       3. Heterozygous factor V Leiden mutation (HCC)        4. Carcinoma in situ of left breast, unspecified type  CT-CHEST,ABDOMEN,PELVIS WITH      5. 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 Repeat renal function (to be current for contrasted study)  -pt is up to date on mammo (normal in Dec 2024 per report), colonoscopy 2024 (date & normal result per brother)    Return for Follow Up: 6 weeks with Dr Angeles      Any questions and concerns raised by the patient were answered to the best of my ability. Thank you for allowing me to participate in the care for this patient. Please feel free to contact me for any questions or concerns.   Total time spent on chart review, clinic encounter, documentation, coordination of care: 30 minutes.   Please note that this dictation was created using voice recognition software. I have made every reasonable attempt to correct obvious errors, but I expect that there are errors of grammar and possibly content that I did not discover before finalizing the note.

## 2025-03-06 LAB
BUN SERPL-MCNC: 15 MG/DL (ref 8–22)
CREAT SERPL-MCNC: 0.86 MG/DL (ref 0.5–1.4)
GFR SERPLBLD CREATININE-BSD FMLA CKD-EPI: 73 ML/MIN/1.73 M 2

## 2025-03-07 LAB
B2 GLYCOPROT1 IGG SERPL IA-ACNC: <10 SGU
B2 GLYCOPROT1 IGM SERPL IA-ACNC: 79 SMU

## 2025-03-09 LAB
APTT SCREEN TO CONFIRM RATIO: 3.01 {RATIO}
CONFIRM DRVVT STA-STACLOT: 37.5 S
DRVVT SCREEN TO CONFIRM RATIO: 1.76 {RATIO}
DRVVT SCREEN TO CONFIRM RATIO: 4.55 {RATIO}
DRVVT/DRVVT CFM P DOAC NEUT NORM PPP-RTO: 2.15 {RATIO}
HEPARIN NT PPP QL: ABNORMAL
LA 3 SCREEN W REFLEX-IMP: ABNORMAL
LMW HEPARIN IND PLT AB SER QL: PRESENT
MIXING DRVVT: 1.81 S
PROTHROMBIN TIME: 16.2 S (ref 12–15.5)
SCREEN APTT: 2.27 S
THROMBIN TIME: 19.4 S

## 2025-03-10 ENCOUNTER — RESULTS FOLLOW-UP (OUTPATIENT)
Dept: HEMATOLOGY ONCOLOGY | Facility: MEDICAL CENTER | Age: 70
End: 2025-03-10
Payer: MEDICARE

## 2025-03-12 ENCOUNTER — HOSPITAL ENCOUNTER (EMERGENCY)
Facility: MEDICAL CENTER | Age: 70
End: 2025-03-12
Attending: EMERGENCY MEDICINE
Payer: MEDICARE

## 2025-03-12 ENCOUNTER — APPOINTMENT (OUTPATIENT)
Dept: RADIOLOGY | Facility: MEDICAL CENTER | Age: 70
End: 2025-03-12
Attending: EMERGENCY MEDICINE
Payer: MEDICARE

## 2025-03-12 VITALS
DIASTOLIC BLOOD PRESSURE: 70 MMHG | RESPIRATION RATE: 18 BRPM | BODY MASS INDEX: 27.46 KG/M2 | HEIGHT: 63 IN | WEIGHT: 155 LBS | OXYGEN SATURATION: 93 % | SYSTOLIC BLOOD PRESSURE: 143 MMHG | TEMPERATURE: 98.1 F | HEART RATE: 84 BPM

## 2025-03-12 DIAGNOSIS — R06.02 SHORTNESS OF BREATH: ICD-10-CM

## 2025-03-12 LAB
ALBUMIN SERPL BCP-MCNC: 3.9 G/DL (ref 3.2–4.9)
ALBUMIN/GLOB SERPL: 1.3 G/DL
ALP SERPL-CCNC: 88 U/L (ref 30–99)
ALT SERPL-CCNC: 15 U/L (ref 2–50)
ANION GAP SERPL CALC-SCNC: 8 MMOL/L (ref 7–16)
AST SERPL-CCNC: 36 U/L (ref 12–45)
BASOPHILS # BLD AUTO: 0.7 % (ref 0–1.8)
BASOPHILS # BLD: 0.06 K/UL (ref 0–0.12)
BILIRUB SERPL-MCNC: 0.3 MG/DL (ref 0.1–1.5)
BUN SERPL-MCNC: 16 MG/DL (ref 8–22)
CALCIUM ALBUM COR SERPL-MCNC: 9.6 MG/DL (ref 8.5–10.5)
CALCIUM SERPL-MCNC: 9.5 MG/DL (ref 8.5–10.5)
CHLORIDE SERPL-SCNC: 102 MMOL/L (ref 96–112)
CO2 SERPL-SCNC: 27 MMOL/L (ref 20–33)
CREAT SERPL-MCNC: 0.96 MG/DL (ref 0.5–1.4)
D DIMER PPP IA.FEU-MCNC: 0.32 UG/ML (FEU) (ref 0–0.5)
EKG IMPRESSION: NORMAL
EOSINOPHIL # BLD AUTO: 0.07 K/UL (ref 0–0.51)
EOSINOPHIL NFR BLD: 0.8 % (ref 0–6.9)
ERYTHROCYTE [DISTWIDTH] IN BLOOD BY AUTOMATED COUNT: 56 FL (ref 35.9–50)
FLUAV RNA SPEC QL NAA+PROBE: NEGATIVE
FLUBV RNA SPEC QL NAA+PROBE: NEGATIVE
GFR SERPLBLD CREATININE-BSD FMLA CKD-EPI: 64 ML/MIN/1.73 M 2
GLOBULIN SER CALC-MCNC: 3 G/DL (ref 1.9–3.5)
GLUCOSE SERPL-MCNC: 109 MG/DL (ref 65–99)
HCT VFR BLD AUTO: 47.6 % (ref 37–47)
HGB BLD-MCNC: 15.6 G/DL (ref 12–16)
IMM GRANULOCYTES # BLD AUTO: 0.05 K/UL (ref 0–0.11)
IMM GRANULOCYTES NFR BLD AUTO: 0.6 % (ref 0–0.9)
LYMPHOCYTES # BLD AUTO: 0.98 K/UL (ref 1–4.8)
LYMPHOCYTES NFR BLD: 11.3 % (ref 22–41)
MCH RBC QN AUTO: 29.5 PG (ref 27–33)
MCHC RBC AUTO-ENTMCNC: 32.8 G/DL (ref 32.2–35.5)
MCV RBC AUTO: 90 FL (ref 81.4–97.8)
MONOCYTES # BLD AUTO: 0.61 K/UL (ref 0–0.85)
MONOCYTES NFR BLD AUTO: 7 % (ref 0–13.4)
NEUTROPHILS # BLD AUTO: 6.94 K/UL (ref 1.82–7.42)
NEUTROPHILS NFR BLD: 79.6 % (ref 44–72)
NRBC # BLD AUTO: 0 K/UL
NRBC BLD-RTO: 0 /100 WBC (ref 0–0.2)
NT-PROBNP SERPL IA-MCNC: 239 PG/ML (ref 0–125)
PLATELET # BLD AUTO: 404 K/UL (ref 164–446)
PMV BLD AUTO: 10 FL (ref 9–12.9)
POTASSIUM SERPL-SCNC: 4.5 MMOL/L (ref 3.6–5.5)
PROT SERPL-MCNC: 6.9 G/DL (ref 6–8.2)
RBC # BLD AUTO: 5.29 M/UL (ref 4.2–5.4)
RSV RNA SPEC QL NAA+PROBE: NEGATIVE
SARS-COV-2 RNA RESP QL NAA+PROBE: NOTDETECTED
SODIUM SERPL-SCNC: 137 MMOL/L (ref 135–145)
TROPONIN T SERPL-MCNC: 9 NG/L (ref 6–19)
WBC # BLD AUTO: 8.7 K/UL (ref 4.8–10.8)

## 2025-03-12 PROCEDURE — 85379 FIBRIN DEGRADATION QUANT: CPT

## 2025-03-12 PROCEDURE — 85025 COMPLETE CBC W/AUTO DIFF WBC: CPT

## 2025-03-12 PROCEDURE — 99284 EMERGENCY DEPT VISIT MOD MDM: CPT

## 2025-03-12 PROCEDURE — 84484 ASSAY OF TROPONIN QUANT: CPT

## 2025-03-12 PROCEDURE — 36415 COLL VENOUS BLD VENIPUNCTURE: CPT

## 2025-03-12 PROCEDURE — 93005 ELECTROCARDIOGRAM TRACING: CPT | Mod: TC | Performed by: EMERGENCY MEDICINE

## 2025-03-12 PROCEDURE — 71045 X-RAY EXAM CHEST 1 VIEW: CPT

## 2025-03-12 PROCEDURE — 80053 COMPREHEN METABOLIC PANEL: CPT

## 2025-03-12 PROCEDURE — 93005 ELECTROCARDIOGRAM TRACING: CPT | Mod: TC

## 2025-03-12 PROCEDURE — 0241U HCHG SARS-COV-2 COVID-19 NFCT DS RESP RNA 4 TRGT ED POC: CPT

## 2025-03-12 PROCEDURE — 83880 ASSAY OF NATRIURETIC PEPTIDE: CPT

## 2025-03-12 ASSESSMENT — FIBROSIS 4 INDEX: FIB4 SCORE: 1.42

## 2025-03-12 NOTE — ED NOTES
Patient provided discharge instructions, verbalizes understanding and denies any further questions. Patient instructed to follow up with PCP and return if condition worsens. No distress noted. Caregiver at bedside to transport patient home. Patient ambulated to the front lobby with a steady gait and all belongings.

## 2025-03-12 NOTE — DISCHARGE INSTRUCTIONS
Your workup is very reassuring.  Your EKG was normal, your heart failure enzyme was normal, your blood clot enzyme was normal.  Your kidney function, liver function, and blood counts are all reassuring.  The rest of your exam was very reassuring.  If symptoms worsen significantly return to the emergency department, otherwise follow-up primary care provider

## 2025-03-12 NOTE — ED PROVIDER NOTES
ED Provider Note    CHIEF COMPLAINT  Chief Complaint   Patient presents with    Other     Describes pressure in bilateral lungs and neck intermittent for several weeks. Denies Chest pain. Denies sob or recent illness         HPI/ROS  LIMITATION TO HISTORY   Longstanding history of schizophrenia      Gena Dykes is a 69 y.o. female who presents with chief complaint of vague shortness of breath.  Patient reports that she woke up this morning feeling like she had heavy lungs and a heavy neck.  She states that this began when she woke up and persisted and therefore she told her group home staff about this, they sent her to the emerged part for further care.  Patient has a longstanding history of schizophrenia.  She has not had any major changes in her medications.  She does have a history of a PE in the last few months, and is currently on Eliquis for this.  Her group home states she takes this regularly and does not miss doses, they manage her medications.  Patient reports that her lung and neck pressure have since resolved and she feels fine.  She denies any associated chest pain or shortness of breath.  Patient denies any association of symptoms with exertion.  She denies any nausea or diaphoresis.  Patient denies any worsening of pain with deep breaths.    PAST MEDICAL HISTORY       SURGICAL HISTORY   has a past surgical history that includes lumpectomy (Left) and mass excision general (2017).    FAMILY HISTORY  Family History   Problem Relation Age of Onset    Heart Disease Mother        SOCIAL HISTORY  Social History     Tobacco Use    Smoking status: Former     Current packs/day: 0.00     Types: Cigarettes     Quit date: 2011     Years since quittin.2    Smokeless tobacco: Never    Tobacco comments:     smoked for 45 years   Vaping Use    Vaping status: Never Used   Substance and Sexual Activity    Alcohol use: No     Alcohol/week: 0.0 oz    Drug use: No    Sexual activity: Not on file  "      CURRENT MEDICATIONS  Home Medications       Reviewed by Jolynn Zayas R.N. (Registered Nurse) on 03/12/25 at 1056  Med List Status: Not Addressed     Medication Last Dose Status   apixaban (ELIQUIS) 5mg Tab  Active   Asenapine Maleate 10 MG SL Tab  Active   atorvastatin (LIPITOR) 40 MG Tab  Active   Calcium Carb-Cholecalciferol (CALCIUM + VITAMIN D3) 600-5 MG-MCG Tab  Active   clozapine (CLOZARIL) 100 MG TABS  Active   docusate calcium (SURFAK) 240 MG Cap  Active   Multiple Vitamin (MULTIVITAMIN) Tab  Active   Omega-3 Fatty Acids (FISH OIL) 1000 MG Cap capsule  Active   polyethylene glycol/lytes (MIRALAX) Pack  Active   verapamil (ISOPTIN) 40 MG Tab  Active                    ALLERGIES  Allergies   Allergen Reactions    Azithromycin     Fluphenazine      hallucinations    Haldol [Haloperidol]     Erythromycin      Pt on Clozapine/no erythromycin    Haldol [Haloperidol Lactate]      Does not tolerate       PHYSICAL EXAM  VITAL SIGNS: /59   Pulse 87   Temp 36.7 °C (98.1 °F) (Temporal)   Resp 20   Ht 1.6 m (5' 3\")   Wt 70.3 kg (155 lb)   LMP 07/03/2000 (Approximate)   SpO2 93%   BMI 27.46 kg/m²    Physical Exam  Constitutional:       Appearance: She is well-developed.   HENT:      Head: Normocephalic and atraumatic.   Eyes:      Pupils: Pupils are equal, round, and reactive to light.   Cardiovascular:      Rate and Rhythm: Normal rate and regular rhythm.   Pulmonary:      Effort: Pulmonary effort is normal. No accessory muscle usage or respiratory distress.      Breath sounds: Normal breath sounds.   Abdominal:      General: Bowel sounds are normal.      Palpations: Abdomen is soft. There is no mass.      Tenderness: There is no abdominal tenderness.   Musculoskeletal:         General: Normal range of motion.   Skin:     General: Skin is warm.      Capillary Refill: Capillary refill takes less than 2 seconds.   Neurological:      General: No focal deficit present.      Mental Status: She is " alert.   Psychiatric:         Mood and Affect: Mood normal. Mood is not anxious.      Comments: Flat affect           EKG/LABS  Results for orders placed or performed during the hospital encounter of 25   EKG    Collection Time: 25 11:29 AM   Result Value Ref Range    Report       [facility] Emergency Dept[facility]    Test Date:  2025  Pt Name:    ANDRES BANUELOS               Department: ER  MRN:        [id number]                      Room:       [facility]  Gender:     Female                       Technician: [id number]  :        [id number]                   Requested By:SHAHLA BUSH  Order #:    [id number]                    Reading MD: Lissy Carvajal MD    Measurements  Intervals                                Axis  Rate:       80                           P:          65  ID:         [id number]                          QRS:        50  QRSD:       86                           T:          30  QT:         387  QTc:        447    Interpretive Statements  EKG is nsr, Normal axis normal intervals no ST changes consistent with acute  regional ischemia  Electronically Signed On 2025 11:29:07 PDT by Lissy Carvajal MD     CBC WITH DIFFERENTIAL    Collection Time: 25 11:36 AM   Result Value Ref Range    WBC 8.7 4.8 - 10.8 K/uL    RBC 5.29 4.20 - 5.40 M/uL    Hemoglobin 15.6 12.0 - 16.0 g/dL    Hematocrit 47.6 (H) 37.0 - 47.0 %    MCV 90.0 81.4 - 97.8 fL    MCH 29.5 27.0 - 33.0 pg    MCHC 32.8 32.2 - 35.5 g/dL    RDW 56.0 (H) 35.9 - 50.0 fL    Platelet Count 404 164 - 446 K/uL    MPV 10.0 9.0 - 12.9 fL    Neutrophils-Polys 79.60 (H) 44.00 - 72.00 %    Lymphocytes 11.30 (L) 22.00 - 41.00 %    Monocytes 7.00 0.00 - 13.40 %    Eosinophils 0.80 0.00 - 6.90 %    Basophils 0.70 0.00 - 1.80 %    Immature Granulocytes 0.60 0.00 - 0.90 %    Nucleated RBC 0.00 0.00 - 0.20 /100 WBC    Neutrophils (Absolute) 6.94 1.82 - 7.42 K/uL    Lymphs (Absolute) 0.98 (L) 1.00 - 4.80 K/uL    Monos (Absolute) 0.61 0.00 -  0.85 K/uL    Eos (Absolute) 0.07 0.00 - 0.51 K/uL    Baso (Absolute) 0.06 0.00 - 0.12 K/uL    Immature Granulocytes (abs) 0.05 0.00 - 0.11 K/uL    NRBC (Absolute) 0.00 K/uL   CMP    Collection Time: 03/12/25 11:36 AM   Result Value Ref Range    Sodium 137 135 - 145 mmol/L    Potassium 4.5 3.6 - 5.5 mmol/L    Chloride 102 96 - 112 mmol/L    Co2 27 20 - 33 mmol/L    Anion Gap 8.0 7.0 - 16.0    Glucose 109 (H) 65 - 99 mg/dL    Bun 16 8 - 22 mg/dL    Creatinine 0.96 0.50 - 1.40 mg/dL    Calcium 9.5 8.5 - 10.5 mg/dL    Correct Calcium 9.6 8.5 - 10.5 mg/dL    AST(SGOT) 36 12 - 45 U/L    ALT(SGPT) 15 2 - 50 U/L    Alkaline Phosphatase 88 30 - 99 U/L    Total Bilirubin 0.3 0.1 - 1.5 mg/dL    Albumin 3.9 3.2 - 4.9 g/dL    Total Protein 6.9 6.0 - 8.2 g/dL    Globulin 3.0 1.9 - 3.5 g/dL    A-G Ratio 1.3 g/dL   TROPONIN    Collection Time: 03/12/25 11:36 AM   Result Value Ref Range    Troponin T 9 6 - 19 ng/L   D-DIMER    Collection Time: 03/12/25 11:36 AM   Result Value Ref Range    D-Dimer 0.32 0.00 - 0.50 ug/mL (FEU)   proBrain Natriuretic Peptide, NT    Collection Time: 03/12/25 11:36 AM   Result Value Ref Range    NT-proBNP 239 (H) 0 - 125 pg/mL   ESTIMATED GFR    Collection Time: 03/12/25 11:36 AM   Result Value Ref Range    GFR (CKD-EPI) 64 >60 mL/min/1.73 m 2   POC CoV-2, FLU A/B, RSV by PCR    Collection Time: 03/12/25 11:45 AM   Result Value Ref Range    POC Influenza A RNA, PCR Negative Negative    POC Influenza B RNA, PCR Negative Negative    POC RSV, by PCR Negative Negative    POC SARS-CoV-2, PCR NotDetected NotDetected       I have independently interpreted this EKG    RADIOLOGY/PROCEDURES   I have independently interpreted the diagnostic imaging associated with this visit and am waiting the final reading from the radiologist.   My preliminary interpretation is as follows: CXR is unremarkable    Radiologist interpretation:  DX-CHEST-PORTABLE (1 VIEW)   Final Result         Ill-defined pulmonary opacifications  have decreased compared to prior radiograph. Findings could indicate decrease in pulmonary edema or inflammation.      No new infiltrates or consolidations are identified.            COURSE & MEDICAL DECISION MAKING    ASSESSMENT, COURSE AND PLAN  Care Narrative: Very well-appearing patient here with entirely unremarkable exam.  She has a history of schizophrenia which does limit history.  She has a history of PE and is complaining of questionable shortness of breath, will check D-dimer though my suspicion is low here.  Patient is not hypoxic.  Patient will have BNP, EKG, troponin checked for possible ACS.  Symptoms began earlier this morning, greater than 3 hours prior to presentation here, therefore I do believe a single troponin is sufficient.  Patient's symptoms have entirely resolved.  Patient basic labs are all very reassuring.  Patient without any concerning findings, I reviewed patient's chest x-ray, it appears normal without any significant concerning findings.  My suspicion of pneumonia is low here in this afebrile patient, she is not hypoxic, normal work of breathing, her BNP is normal, pulmonary edema unlikely.  D-dimer also within normal limits.  Patient discharged in good condition                FINAL DIAGNOSIS  1. Shortness of breath

## 2025-03-12 NOTE — ED NOTES
Brother contacted to inform of pending d/c.   Radha  manager contacted, VM left. Brother ok to  pt if Radha unable to per brother.

## 2025-03-12 NOTE — ED NOTES
Bedside report received from off going RN: Jolynn, assumed care of patient.  POC discussed with patient. Call light within reach, all needs addressed at this time.       Fall risk interventions in place: Patient's personal possessions are with in their safe reach, Place socks on patient, Place fall risk sign on patient's door, Keep floor surfaces clean and dry, and Accompanied to restroom (all applicable per [facility] Fall risk assessment)   Continuous monitoring: Cardiac Leads, Pulse Ox, or Blood Pressure  IVF/IV medications: Not Applicable   Oxygen: Room Air  Bedside sitter: Not Applicable   Isolation: Not Applicable

## 2025-03-12 NOTE — ED TRIAGE NOTES
.  Chief Complaint   Patient presents with    Other     Describes pressure in bilateral lungs and neck intermittent for several weeks. Denies Chest pain. Denies sob or recent illness     BIBA from assisted living at [address]  Pt told staff this morning she has pressure in her lungs and neck. Denies pain, no sob or wheezing noted denies cough. Pt A&O x 4 with hx of PE in December. On eliquis

## 2025-03-28 ENCOUNTER — TELEPHONE (OUTPATIENT)
Dept: HEALTH INFORMATION MANAGEMENT | Facility: OTHER | Age: 70
End: 2025-03-28

## 2025-04-04 ENCOUNTER — HOSPITAL ENCOUNTER (OUTPATIENT)
Dept: RADIOLOGY | Facility: MEDICAL CENTER | Age: 70
End: 2025-04-04
Attending: PHYSICIAN ASSISTANT
Payer: MEDICARE

## 2025-04-04 DIAGNOSIS — C21.0 ANAL SQUAMOUS CELL CARCINOMA (HCC): ICD-10-CM

## 2025-04-04 DIAGNOSIS — I26.94 MULTIPLE SUBSEGMENTAL PULMONARY EMBOLI WITHOUT ACUTE COR PULMONALE (HCC): ICD-10-CM

## 2025-04-04 DIAGNOSIS — D05.92 CARCINOMA IN SITU OF LEFT BREAST, UNSPECIFIED TYPE: ICD-10-CM

## 2025-04-04 PROCEDURE — 71260 CT THORAX DX C+: CPT

## 2025-04-04 PROCEDURE — 700117 HCHG RX CONTRAST REV CODE 255: Performed by: PHYSICIAN ASSISTANT

## 2025-04-04 RX ADMIN — IOHEXOL 100 ML: 350 INJECTION, SOLUTION INTRAVENOUS at 08:32

## 2025-04-08 ENCOUNTER — HOSPITAL ENCOUNTER (OUTPATIENT)
Dept: HEMATOLOGY ONCOLOGY | Facility: MEDICAL CENTER | Age: 70
End: 2025-04-08
Attending: STUDENT IN AN ORGANIZED HEALTH CARE EDUCATION/TRAINING PROGRAM
Payer: MEDICARE

## 2025-04-08 VITALS
HEART RATE: 91 BPM | OXYGEN SATURATION: 93 % | DIASTOLIC BLOOD PRESSURE: 86 MMHG | HEIGHT: 63 IN | BODY MASS INDEX: 27.23 KG/M2 | TEMPERATURE: 98.3 F | WEIGHT: 153.66 LBS | SYSTOLIC BLOOD PRESSURE: 134 MMHG

## 2025-04-08 DIAGNOSIS — D68.51 HETEROZYGOUS FACTOR V LEIDEN MUTATION (HCC): ICD-10-CM

## 2025-04-08 DIAGNOSIS — I26.94 MULTIPLE SUBSEGMENTAL PULMONARY EMBOLI WITHOUT ACUTE COR PULMONALE (HCC): ICD-10-CM

## 2025-04-08 DIAGNOSIS — D68.61 ANTIPHOSPHOLIPID ANTIBODY SYNDROME (HCC): ICD-10-CM

## 2025-04-08 PROCEDURE — 99212 OFFICE O/P EST SF 10 MIN: CPT | Performed by: STUDENT IN AN ORGANIZED HEALTH CARE EDUCATION/TRAINING PROGRAM

## 2025-04-08 PROCEDURE — 99214 OFFICE O/P EST MOD 30 MIN: CPT | Performed by: STUDENT IN AN ORGANIZED HEALTH CARE EDUCATION/TRAINING PROGRAM

## 2025-04-08 ASSESSMENT — ENCOUNTER SYMPTOMS
WHEEZING: 0
CHILLS: 0
ABDOMINAL PAIN: 0
DIZZINESS: 0
HEMOPTYSIS: 0
CONSTIPATION: 0
VOMITING: 0
ORTHOPNEA: 0
WEIGHT LOSS: 0
COUGH: 0
TINGLING: 0
DIAPHORESIS: 0
PALPITATIONS: 0
BLOOD IN STOOL: 0
SPUTUM PRODUCTION: 0
SHORTNESS OF BREATH: 0
MYALGIAS: 0
NAUSEA: 0
SENSORY CHANGE: 0
FEVER: 0
BLURRED VISION: 0
HEADACHES: 0
DOUBLE VISION: 0
BRUISES/BLEEDS EASILY: 0
DIARRHEA: 0

## 2025-04-08 ASSESSMENT — FIBROSIS 4 INDEX: FIB4 SCORE: 1.56

## 2025-04-08 NOTE — PROGRESS NOTES
Follow Up Note:  Hematology/Oncology    Current Diagnosis : bilateral PE  Date of Diagnosis: dec 2024    Chief Complaint: Patient seen today for evaluation and ongoing management of thrombophilia workup in setting of PE.     Care Team:   Kourtney Pope M.D.    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  Review of her results; PET scan 2019 demonstrates mild nonspecific activity in the anus without a discrete mass, small fluid collections in the groin bilaterally with mild increased metabolic activity.  She underwent biopsy in 2017 and multiple nodes in bilateral groins which were negative. Fx Hx of breast CA (great aunt), prostate CA (uncle). Pt's brother denies any known history of hematologic or autoimmune conditions in the family.     Cancer screening:   -Mammogram 12- within normal limits, next due December 2025  -Colonoscopy 2024, within normal limits (per brothers report)     In December 2024 a caregiver found her to be extremely weak, complaining of flulike symptoms x 4 days as well as progressive weakness and cough.  Was diagnosed with pulmonary embolism (bilateral lower lobe subsegmental pulmonary emboli without evidence of right heart strain) in the ED and started on Eliquis, referred to hematology for further mgmt. [place] to be unprovoked.     Dr Pavon evaluated pt in Feb 2025 & ordered a thrombophilia workup which revealed heterozygous for factor V Leiden mutation, elevated protein C (179), elevated anticardiolipin IgM (29).     Current Treatment: Eliquis 5mg BID since Dec 2024    Interval History:  Gena returns to clinic accompanied  by her brother who provides some of her history and assists with her care (in addition to her group home).  She is doing well - continues to walk regularly.  Intermittently feels SOB but self resolves.  Reports the previous lung pressure she had has resolved.  Tolerating eliquis with no issues,  no concerns for easy bleeding or bruising.     Allergies as of 04/08/2025 - Reviewed 03/12/2025   Allergen Reaction Noted    Azithromycin  11/20/2014    Fluphenazine  10/21/2014    Haldol [haloperidol]  10/21/2014    Erythromycin  06/14/2012    Haldol [haloperidol lactate]  06/14/2012       Current Outpatient Medications:     atorvastatin (LIPITOR) 40 MG Tab, TAKE 1 TABLET BY MOUTH EVERY EVENING, Disp: 90 Tablet, Rfl: 2    Calcium Carb-Cholecalciferol (CALCIUM + VITAMIN D3) 600-5 MG-MCG Tab, Take 1 Tablet by mouth every day., Disp: 90 Tablet, Rfl: 3    apixaban (ELIQUIS) 5mg Tab, Take 1 Tablet by mouth 2 times a day., Disp: 180 Tablet, Rfl: 0    docusate calcium (SURFAK) 240 MG Cap, Take 1 Capsule by mouth every day., Disp: 90 Capsule, Rfl: 3    verapamil (ISOPTIN) 40 MG Tab, TAKE 1 TABLET BY MOUTH TWICE DAILY, Disp: 180 Tablet, Rfl: 3    Omega-3 Fatty Acids (FISH OIL) 1000 MG Cap capsule, Take 1,000 mg by mouth 2 times a day., Disp: , Rfl:     Multiple Vitamin (MULTIVITAMIN) Tab, TAKE 1 TABLET BY MOUTH ONCE DAILY (Patient taking differently: Take 1 Tablet by mouth every day.), Disp: 30 Tablet, Rfl: 11    Asenapine Maleate 10 MG SL Tab, Place 1 Tablet under the tongue 2 times a day., Disp: , Rfl:     polyethylene glycol/lytes (MIRALAX) Pack, Take 17 g by mouth every day., Disp: , Rfl:     clozapine (CLOZARIL) 100 MG TABS, Take 100-300 mg by mouth in the morning, at noon, and at bedtime. Take 200 mg in am, 100 mg in the afternoon, 300 mg at night, Disp: , Rfl:     Review of Systems:  Review of Systems   Constitutional:  Negative for chills, diaphoresis, fever, malaise/fatigue and weight loss.   HENT:  Negative for tinnitus.   "  Eyes:  Negative for blurred vision and double vision.   Respiratory:  Negative for cough, hemoptysis, sputum production, shortness of breath and wheezing.    Cardiovascular:  Negative for chest pain (see HPI), palpitations, orthopnea and leg swelling.   Gastrointestinal:  Negative for abdominal pain, blood in stool, constipation, diarrhea, nausea and vomiting.   Genitourinary:  Negative for dysuria and hematuria.   Musculoskeletal:  Negative for myalgias.   Skin:  Negative for rash.   Neurological:  Negative for dizziness, tingling, sensory change and headaches.   Endo/Heme/Allergies:  Does not bruise/bleed easily.     Physical Exam:  Vitals:    04/08/25 1100   BP: 134/86   BP Location: Right arm   Patient Position: Sitting   BP Cuff Size: Adult   Pulse: 91   Temp: 36.8 °C (98.3 °F)   TempSrc: Temporal   SpO2: 93%   Weight: 69.7 kg (153 lb 10.6 oz)   Height: 1.6 m (5' 2.99\")     Physical Exam  Constitutional:       Appearance: Normal appearance.   Cardiovascular:      Rate and Rhythm: Normal rate and regular rhythm.      Heart sounds: Normal heart sounds.   Pulmonary:      Effort: Pulmonary effort is normal.      Breath sounds: Normal breath sounds.   Abdominal:      General: Abdomen is flat. Bowel sounds are normal.      Palpations: Abdomen is soft.   Neurological:      Mental Status: She is alert and oriented to person, place, and time.   Psychiatric:         Mood and Affect: Affect is flat.         Cognition and Memory: Memory is impaired.      Comments: Per brother, pt has impaired cognition at baseline (\"unable to fully understand her condition or make decisions\") but pt is cooperative and answers questions appropriately to the best of her knowledge     Labs: reviewed with pt today   Latest Reference Range & Units 02/05/25 11:27   WBC 4.8 - 10.8 K/uL 7.6   RBC 4.20 - 5.40 M/uL 5.30   Hemoglobin 12.0 - 16.0 g/dL 15.7   Hematocrit 37.0 - 47.0 % 47.6 (H)   MCV 81.4 - 97.8 fL 89.8   MCH 27.0 - 33.0 pg 29.6   MCHC " 32.2 - 35.5 g/dL 33.0   RDW 35.9 - 50.0 fL 55.8 (H)   Platelet Count 164 - 446 K/uL 418   MPV 9.0 - 12.9 fL 10.5   Neutrophils-Polys 44.00 - 72.00 % 74.90 (H)   Neutrophils (Absolute) 1.82 - 7.42 K/uL 5.68   Lymphocytes 22.00 - 41.00 % 16.50 (L)   Lymphs (Absolute) 1.00 - 4.80 K/uL 1.25   Monocytes 0.00 - 13.40 % 7.60   Monos (Absolute) 0.00 - 0.85 K/uL 0.58   Eosinophils 0.00 - 6.90 % 0.10   Eos (Absolute) 0.00 - 0.51 K/uL 0.01   Basophils 0.00 - 1.80 % 0.50   Baso (Absolute) 0.00 - 0.12 K/uL 0.04   Immature Granulocytes 0.00 - 0.90 % 0.40   Immature Granulocytes (abs) 0.00 - 0.11 K/uL 0.03   Nucleated RBC 0.00 - 0.20 /100 WBC 0.00   NRBC (Absolute) K/uL 0.00   Sodium 135 - 145 mmol/L 139   Potassium 3.6 - 5.5 mmol/L 4.7   Chloride 96 - 112 mmol/L 101   Co2 20 - 33 mmol/L 25   Anion Gap 7.0 - 16.0  13.0   Glucose 65 - 99 mg/dL 77   Bun 8 - 22 mg/dL 22   Creatinine 0.50 - 1.40 mg/dL 1.01   GFR (CKD-EPI) >60 mL/min/1.73 m 2 61   Calcium 8.5 - 10.5 mg/dL 10.4   Correct Calcium 8.5 - 10.5 mg/dL 10.2   AST(SGOT) 12 - 45 U/L 39   ALT(SGPT) 2 - 50 U/L 20   Alkaline Phosphatase 30 - 99 U/L 86   Total Bilirubin 0.1 - 1.5 mg/dL 0.3   Albumin 3.2 - 4.9 g/dL 4.2   Total Protein 6.0 - 8.2 g/dL 7.7   Globulin 1.9 - 3.5 g/dL 3.5   A-G Ratio g/dL 1.2   Factor Ii Dna Analysis Pt Gene  Negative   V Leiden Factor  Heterozygous !   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  Discussed logistics with her and her brother and they are amendable but need to see if it is feasible with the group home and will reach out to me after they discuss  DCIS    - reported history 5 years ago s/p lumpectomy treated by Dr. Cervantes    - to their knowledge not HR+ and was not given endocrine therapy    - continue with annual screening mammogram  SCC of Anus   - s/p chemoradiation in 2017 (documented in the chart, but per patient's brother only had radiation) and surgery with Dr. Cloin   - last colonoscopy 12/2024     Given her history of SCC of anus, and lumpectomy for breast cancer CT CAP w/ no evidence of malignancy.   -pt is up to date on mammo (normal in Dec 2024 per report), colonoscopy 2024 (date & normal result per brother)    Return for Follow Up: 12 weeks    Any questions and concerns raised by the patient were answered to the best of my ability. Thank you for allowing me to participate in the care for this patient. Please feel free to contact me for any questions or concerns.   Total time spent on chart review, clinic encounter, documentation, coordination of care: 30 minutes.   Please note that this dictation was created using voice recognition software. I have made every reasonable attempt to correct obvious errors, but I expect that there are errors of grammar and possibly content that I did not discover before finalizing the note.

## 2025-04-14 ENCOUNTER — RESULTS FOLLOW-UP (OUTPATIENT)
Dept: HEMATOLOGY ONCOLOGY | Facility: MEDICAL CENTER | Age: 70
End: 2025-04-14
Payer: MEDICARE

## 2025-05-16 RX ORDER — DOCUSATE SODIUM 100 MG/1
100 CAPSULE, LIQUID FILLED ORAL 2 TIMES DAILY
Qty: 180 CAPSULE | Refills: 0 | Status: SHIPPED | OUTPATIENT
Start: 2025-05-16

## 2025-05-27 ENCOUNTER — NON-PROVIDER VISIT (OUTPATIENT)
Dept: OCCUPATIONAL MEDICINE | Facility: CLINIC | Age: 70
End: 2025-05-27

## 2025-05-27 DIAGNOSIS — Z11.1 PPD SCREENING TEST: Primary | ICD-10-CM

## 2025-05-27 PROCEDURE — 86580 TB INTRADERMAL TEST: CPT | Performed by: NURSE PRACTITIONER

## 2025-05-29 ENCOUNTER — NON-PROVIDER VISIT (OUTPATIENT)
Dept: OCCUPATIONAL MEDICINE | Facility: CLINIC | Age: 70
End: 2025-05-29

## 2025-05-29 LAB — TB WHEAL 3D P 5 TU DIAM: NORMAL MM

## 2025-06-13 RX ORDER — CALCIUM CARBONATE/VITAMIN D3 600MG-5MCG
1 TABLET ORAL DAILY
Qty: 30 TABLET | Refills: 6 | Status: SHIPPED | OUTPATIENT
Start: 2025-06-13

## 2025-06-13 NOTE — TELEPHONE ENCOUNTER
Received request via: Pharmacy    Was the patient seen in the last year in this department? Yes    Does the patient have an active prescription (recently filled or refills available) for medication(s) requested? No    Pharmacy Name: [facility]    Does the patient have retirement Plus and need 100-day supply? (This applies to ALL medications) Patient does not have SCP

## 2025-07-01 DIAGNOSIS — I20.89 MICROVASCULAR ANGINA (HCC): Primary | ICD-10-CM

## 2025-07-01 DIAGNOSIS — I21.9 MYOCARDIAL INFARCTION WITH NONOBSTRUCTIVE CORONARY ARTERIES (HCC): ICD-10-CM

## 2025-07-01 RX ORDER — VERAPAMIL HYDROCHLORIDE 40 MG/1
40 TABLET ORAL 2 TIMES DAILY
Qty: 180 TABLET | Refills: 0 | Status: SHIPPED | OUTPATIENT
Start: 2025-07-01

## 2025-07-01 NOTE — TELEPHONE ENCOUNTER
Is the patient due for a refill? Yes    Was the patient seen the last 15 months? Yes    Date of last office visit: 9/19/2024    Does the patient have an upcoming appointment?  No       Provider to refill:[facility]    Does the patient have assisted Plus and need 100-day supply? (This applies to ALL medications) Patient does not have SCP

## 2025-07-01 NOTE — TELEPHONE ENCOUNTER
Last OV with HK on 9/19/24. No calls/ER visits for symptomatic bradycardia.   90 day refill sent to pharmacy per RN protocol. Pt does not have annual OV scheduled yet.     Schedulers: Please contact pt to schedule with [facility] for annual OV. Thank you!

## 2025-07-08 ENCOUNTER — HOSPITAL ENCOUNTER (OUTPATIENT)
Facility: MEDICAL CENTER | Age: 70
End: 2025-07-08
Attending: STUDENT IN AN ORGANIZED HEALTH CARE EDUCATION/TRAINING PROGRAM
Payer: MEDICARE

## 2025-07-08 VITALS
TEMPERATURE: 97.6 F | DIASTOLIC BLOOD PRESSURE: 58 MMHG | BODY MASS INDEX: 26.88 KG/M2 | WEIGHT: 151.68 LBS | OXYGEN SATURATION: 94 % | HEIGHT: 63 IN | HEART RATE: 94 BPM | SYSTOLIC BLOOD PRESSURE: 112 MMHG

## 2025-07-08 DIAGNOSIS — I26.94 MULTIPLE SUBSEGMENTAL PULMONARY EMBOLI WITHOUT ACUTE COR PULMONALE (HCC): Primary | ICD-10-CM

## 2025-07-08 DIAGNOSIS — D68.51 HETEROZYGOUS FACTOR V LEIDEN MUTATION (HCC): ICD-10-CM

## 2025-07-08 DIAGNOSIS — I26.94 MULTIPLE SUBSEGMENTAL PULMONARY EMBOLI WITHOUT ACUTE COR PULMONALE (HCC): ICD-10-CM

## 2025-07-08 DIAGNOSIS — D68.61 ANTIPHOSPHOLIPID ANTIBODY SYNDROME (HCC): ICD-10-CM

## 2025-07-08 LAB
BASOPHILS # BLD AUTO: 0.6 % (ref 0–1.8)
BASOPHILS # BLD: 0.06 K/UL (ref 0–0.12)
EOSINOPHIL # BLD AUTO: 0.01 K/UL (ref 0–0.51)
EOSINOPHIL NFR BLD: 0.1 % (ref 0–6.9)
ERYTHROCYTE [DISTWIDTH] IN BLOOD BY AUTOMATED COUNT: 54.1 FL (ref 35.9–50)
HCT VFR BLD AUTO: 48.4 % (ref 37–47)
HGB BLD-MCNC: 15.8 G/DL (ref 12–16)
IMM GRANULOCYTES # BLD AUTO: 0.06 K/UL (ref 0–0.11)
IMM GRANULOCYTES NFR BLD AUTO: 0.6 % (ref 0–0.9)
LYMPHOCYTES # BLD AUTO: 1.42 K/UL (ref 1–4.8)
LYMPHOCYTES NFR BLD: 14.9 % (ref 22–41)
MCH RBC QN AUTO: 29.9 PG (ref 27–33)
MCHC RBC AUTO-ENTMCNC: 32.6 G/DL (ref 32.2–35.5)
MCV RBC AUTO: 91.5 FL (ref 81.4–97.8)
MONOCYTES # BLD AUTO: 0.95 K/UL (ref 0–0.85)
MONOCYTES NFR BLD AUTO: 10 % (ref 0–13.4)
NEUTROPHILS # BLD AUTO: 7.01 K/UL (ref 1.82–7.42)
NEUTROPHILS NFR BLD: 73.8 % (ref 44–72)
NRBC # BLD AUTO: 0 K/UL
NRBC BLD-RTO: 0 /100 WBC (ref 0–0.2)
PLATELET # BLD AUTO: 415 K/UL (ref 164–446)
PMV BLD AUTO: 10.3 FL (ref 9–12.9)
RBC # BLD AUTO: 5.29 M/UL (ref 4.2–5.4)
WBC # BLD AUTO: 9.5 K/UL (ref 4.8–10.8)

## 2025-07-08 PROCEDURE — 86146 BETA-2 GLYCOPROTEIN ANTIBODY: CPT

## 2025-07-08 PROCEDURE — 99213 OFFICE O/P EST LOW 20 MIN: CPT | Performed by: STUDENT IN AN ORGANIZED HEALTH CARE EDUCATION/TRAINING PROGRAM

## 2025-07-08 PROCEDURE — 85520 HEPARIN ASSAY: CPT

## 2025-07-08 PROCEDURE — 85670 THROMBIN TIME PLASMA: CPT

## 2025-07-08 PROCEDURE — 85613 RUSSELL VIPER VENOM DILUTED: CPT | Mod: 91

## 2025-07-08 PROCEDURE — 85598 HEXAGNAL PHOSPH PLTLT NEUTRL: CPT

## 2025-07-08 PROCEDURE — 99212 OFFICE O/P EST SF 10 MIN: CPT | Performed by: STUDENT IN AN ORGANIZED HEALTH CARE EDUCATION/TRAINING PROGRAM

## 2025-07-08 PROCEDURE — 85025 COMPLETE CBC W/AUTO DIFF WBC: CPT

## 2025-07-08 PROCEDURE — 36415 COLL VENOUS BLD VENIPUNCTURE: CPT

## 2025-07-08 PROCEDURE — 85610 PROTHROMBIN TIME: CPT

## 2025-07-08 PROCEDURE — 86147 CARDIOLIPIN ANTIBODY EA IG: CPT | Mod: 91

## 2025-07-08 PROCEDURE — 85730 THROMBOPLASTIN TIME PARTIAL: CPT | Mod: 91

## 2025-07-08 ASSESSMENT — ENCOUNTER SYMPTOMS
FEVER: 0
PALPITATIONS: 0
MYALGIAS: 0
SPUTUM PRODUCTION: 0
HEMOPTYSIS: 0
DIARRHEA: 0
DOUBLE VISION: 0
HEADACHES: 0
ORTHOPNEA: 0
TINGLING: 0
SHORTNESS OF BREATH: 0
CONSTIPATION: 0
WHEEZING: 0
BRUISES/BLEEDS EASILY: 0
DIAPHORESIS: 0
NAUSEA: 0
VOMITING: 0
ABDOMINAL PAIN: 0
SENSORY CHANGE: 0
CHILLS: 0
BLURRED VISION: 0
DIZZINESS: 0
BLOOD IN STOOL: 0
COUGH: 0
WEIGHT LOSS: 0

## 2025-07-08 ASSESSMENT — FIBROSIS 4 INDEX: FIB4 SCORE: 1.56

## 2025-07-08 NOTE — PROGRESS NOTES
Follow Up Note:  Hematology/Oncology    Current Diagnosis : bilateral PE  Date of Diagnosis: dec 2024    Chief Complaint: Patient seen today for evaluation and ongoing management of thrombophilia workup in setting of PE.     Care Team:   Kourtney Pope M.D.    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  Review of her results; PET scan 2019 demonstrates mild nonspecific activity in the anus without a discrete mass, small fluid collections in the groin bilaterally with mild increased metabolic activity.  She underwent biopsy in 2017 and multiple nodes in bilateral groins which were negative. Fx Hx of breast CA (great aunt), prostate CA (uncle). Pt's brother denies any known history of hematologic or autoimmune conditions in the family.     Cancer screening:   -Mammogram 12- within normal limits, next due December 2025  -Colonoscopy 2024, within normal limits (per brothers report)     In December 2024 a caregiver found her to be extremely weak, complaining of flulike symptoms x 4 days as well as progressive weakness and cough.  Was diagnosed with pulmonary embolism (bilateral lower lobe subsegmental pulmonary emboli without evidence of right heart strain) in the ED and started on Eliquis, referred to hematology for further mgmt. [place] to be unprovoked.     Dr Pavon evaluated pt in Feb 2025 & ordered a thrombophilia workup which revealed heterozygous for factor V Leiden mutation, elevated protein C (179), elevated anticardiolipin IgM (29).     Current Treatment: Eliquis 5mg BID since Dec 2024    Interval History:  Gena returns to clinic accompanied  by her brother who provides some of her history and assists with her care (in addition to her group home).  She is doing well and tolerating eliquis with no increased bleeding or bruising.  Notes that any SOB, palpitations are resolved.  She also previously was getting an anxious or jittery feeling which she feels has also resolved.     Allergies as of 07/08/2025 - Reviewed 07/08/2025   Allergen Reaction Noted    Azithromycin  11/20/2014    Fluphenazine  10/21/2014    Haldol [haloperidol]  10/21/2014    Erythromycin  06/14/2012    Haldol [haloperidol lactate]  06/14/2012       Current Outpatient Medications:     verapamil (ISOPTIN) 40 MG Tab, TAKE 1 TABLET BY MOUTH TWICE DAILY, Disp: 180 Tablet, Rfl: 0    Calcium Carb-Cholecalciferol (CALCIUM + VITAMIN D3) 600-5 MG-MCG Tab, TAKE 1 TABLET BY MOUTH ONCE DAILY, Disp: 30 Tablet, Rfl: 6    docusate sodium (COLACE) 100 MG Cap, Take 1 Capsule by mouth 2 times a day., Disp: 180 Capsule, Rfl: 0    atorvastatin (LIPITOR) 40 MG Tab, TAKE 1 TABLET BY MOUTH EVERY EVENING, Disp: 90 Tablet, Rfl: 2    apixaban (ELIQUIS) 5mg Tab, Take 1 Tablet by mouth 2 times a day., Disp: 180 Tablet, Rfl: 0    docusate calcium (SURFAK) 240 MG Cap, Take 1 Capsule by mouth every day., Disp: 90 Capsule, Rfl: 3    Omega-3 Fatty Acids (FISH OIL) 1000 MG Cap capsule, Take 1,000 mg by mouth 2 times a day., Disp: , Rfl:     Multiple Vitamin (MULTIVITAMIN) Tab, TAKE 1 TABLET BY MOUTH ONCE DAILY (Patient taking differently: Take 1 Tablet by mouth every day.), Disp: 30 Tablet, Rfl: 11    Asenapine Maleate 10 MG SL Tab, Place 1 Tablet under the tongue 2 times a day., Disp: , Rfl:     polyethylene glycol/lytes (MIRALAX) Pack, Take 17 g by mouth every day., Disp: , Rfl:     clozapine (CLOZARIL) 100 MG TABS, Take 100-300 mg by mouth in the morning, at noon, and at bedtime. Take 200 mg in am, 100 mg in the afternoon, 300 mg at night, Disp: , Rfl:     Review of Systems:  Review of Systems   Constitutional:   "Negative for chills, diaphoresis, fever, malaise/fatigue and weight loss.   HENT:  Negative for tinnitus.    Eyes:  Negative for blurred vision and double vision.   Respiratory:  Negative for cough, hemoptysis, sputum production, shortness of breath and wheezing.    Cardiovascular:  Negative for chest pain (see HPI), palpitations, orthopnea and leg swelling.   Gastrointestinal:  Negative for abdominal pain, blood in stool, constipation, diarrhea, nausea and vomiting.   Genitourinary:  Negative for dysuria and hematuria.   Musculoskeletal:  Negative for myalgias.   Skin:  Negative for rash.   Neurological:  Negative for dizziness, tingling, sensory change and headaches.   Endo/Heme/Allergies:  Does not bruise/bleed easily.     Physical Exam:  Vitals:    07/08/25 0844   BP: 112/58   BP Location: Left arm   Patient Position: Sitting   BP Cuff Size: Adult   Pulse: 94   Temp: 36.4 °C (97.6 °F)   TempSrc: Temporal   SpO2: 94%   Weight: 68.8 kg (151 lb 10.8 oz)   Height: 1.6 m (5' 2.99\")     Physical Exam  Constitutional:       Appearance: Normal appearance.   Cardiovascular:      Rate and Rhythm: Normal rate and regular rhythm.      Heart sounds: Normal heart sounds.   Pulmonary:      Effort: Pulmonary effort is normal.      Breath sounds: Normal breath sounds.   Abdominal:      General: Abdomen is flat. Bowel sounds are normal.      Palpations: Abdomen is soft.   Neurological:      Mental Status: She is alert and oriented to person, place, and time.   Psychiatric:         Mood and Affect: Affect is flat.         Cognition and Memory: Memory is impaired.      Comments: Per brother, pt has impaired cognition at baseline (\"unable to fully understand her condition or make decisions\") but pt is cooperative and answers questions appropriately to the best of her knowledge       Labs: reviewed with pt today   Latest Reference Range & Units 02/05/25 11:27   WBC 4.8 - 10.8 K/uL 7.6   RBC 4.20 - 5.40 M/uL 5.30   Hemoglobin 12.0 - " 16.0 g/dL 15.7   Hematocrit 37.0 - 47.0 % 47.6 (H)   MCV 81.4 - 97.8 fL 89.8   MCH 27.0 - 33.0 pg 29.6   MCHC 32.2 - 35.5 g/dL 33.0   RDW 35.9 - 50.0 fL 55.8 (H)   Platelet Count 164 - 446 K/uL 418   MPV 9.0 - 12.9 fL 10.5   Neutrophils-Polys 44.00 - 72.00 % 74.90 (H)   Neutrophils (Absolute) 1.82 - 7.42 K/uL 5.68   Lymphocytes 22.00 - 41.00 % 16.50 (L)   Lymphs (Absolute) 1.00 - 4.80 K/uL 1.25   Monocytes 0.00 - 13.40 % 7.60   Monos (Absolute) 0.00 - 0.85 K/uL 0.58   Eosinophils 0.00 - 6.90 % 0.10   Eos (Absolute) 0.00 - 0.51 K/uL 0.01   Basophils 0.00 - 1.80 % 0.50   Baso (Absolute) 0.00 - 0.12 K/uL 0.04   Immature Granulocytes 0.00 - 0.90 % 0.40   Immature Granulocytes (abs) 0.00 - 0.11 K/uL 0.03   Nucleated RBC 0.00 - 0.20 /100 WBC 0.00   NRBC (Absolute) K/uL 0.00   Sodium 135 - 145 mmol/L 139   Potassium 3.6 - 5.5 mmol/L 4.7   Chloride 96 - 112 mmol/L 101   Co2 20 - 33 mmol/L 25   Anion Gap 7.0 - 16.0  13.0   Glucose 65 - 99 mg/dL 77   Bun 8 - 22 mg/dL 22   Creatinine 0.50 - 1.40 mg/dL 1.01   GFR (CKD-EPI) >60 mL/min/1.73 m 2 61   Calcium 8.5 - 10.5 mg/dL 10.4   Correct Calcium 8.5 - 10.5 mg/dL 10.2   AST(SGOT) 12 - 45 U/L 39   ALT(SGPT) 2 - 50 U/L 20   Alkaline Phosphatase 30 - 99 U/L 86   Total Bilirubin 0.1 - 1.5 mg/dL 0.3   Albumin 3.2 - 4.9 g/dL 4.2   Total Protein 6.0 - 8.2 g/dL 7.7   Globulin 1.9 - 3.5 g/dL 3.5   A-G Ratio g/dL 1.2   Factor Ii Dna Analysis Pt Gene  Negative   V Leiden Factor  Heterozygous !   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  Discussed logistics with her and her brother and they are amendable but need to see if it is feasible with the group home and will reach out to me after they discuss  DCIS    - reported history 5 years ago s/p lumpectomy treated by Dr. Cervantes    - to their knowledge not HR+ and was not given endocrine therapy    - continue with annual screening mammogram, last 12/2024 repeat 12/2025  SCC of Anus   - s/p chemoradiation in 2017 (documented in the chart, but per patient's brother only had radiation) and surgery with Dr. Colin   - last colonoscopy 12/2024     Given her history of SCC of anus, and lumpectomy for breast cancer CT CAP w/ no evidence of malignancy.   -pt is up to date on mammo (normal in Dec 2024 per report), colonoscopy 2024 (date & normal result per brother)    Return for Follow Up: 12 weeks    Any questions and concerns raised by the patient were answered to the best of my ability. Thank you for allowing me to participate in the care for this patient. Please feel free to contact me for any questions or concerns.

## 2025-07-08 NOTE — ADDENDUM NOTE
Encounter addended by: Nyasia Croft, Med Ass't on: 7/8/2025 9:56 AM   Actions taken: Charge Capture section accepted

## 2025-07-10 LAB
B2 GLYCOPROT1 IGG SERPL IA-ACNC: <10 SGU
B2 GLYCOPROT1 IGM SERPL IA-ACNC: 51 SMU
CARDIOLIPIN IGG SER IA-ACNC: <10 GPL
CARDIOLIPIN IGM SER IA-ACNC: 17 MPL

## 2025-07-11 LAB
APTT SCREEN TO CONFIRM RATIO: 2.79 {RATIO}
CONFIRM DRVVT STA-STACLOT: 34.9 S
DRVVT SCREEN TO CONFIRM RATIO: 1.63 {RATIO}
DRVVT SCREEN TO CONFIRM RATIO: 4.32 {RATIO}
DRVVT/DRVVT CFM P DOAC NEUT NORM PPP-RTO: 1.97 {RATIO}
HEPARIN NT PPP QL: ABNORMAL
LA 3 SCREEN W REFLEX-IMP: ABNORMAL
LMW HEPARIN IND PLT AB SER QL: PRESENT
MIXING DRVVT: 1.65 S
PROTHROMBIN TIME: 15.6 S (ref 12–15.5)
SCREEN APTT: 2.25 S
THROMBIN TIME: 18.7 S

## 2025-07-11 RX ORDER — APIXABAN 5 MG/1
5 TABLET, FILM COATED ORAL 2 TIMES DAILY
Qty: 60 TABLET | Refills: 11 | Status: SHIPPED | OUTPATIENT
Start: 2025-07-11

## 2025-08-21 ENCOUNTER — OFFICE VISIT (OUTPATIENT)
Dept: MEDICAL GROUP | Facility: CLINIC | Age: 70
End: 2025-08-21
Payer: MEDICARE

## 2025-08-21 VITALS
SYSTOLIC BLOOD PRESSURE: 111 MMHG | DIASTOLIC BLOOD PRESSURE: 74 MMHG | HEIGHT: 63 IN | TEMPERATURE: 98.1 F | OXYGEN SATURATION: 90 % | HEART RATE: 91 BPM | WEIGHT: 153.2 LBS | BODY MASS INDEX: 27.14 KG/M2

## 2025-08-21 DIAGNOSIS — Z23 NEED FOR VACCINATION: Primary | ICD-10-CM

## 2025-08-21 DIAGNOSIS — K59.00 CONSTIPATION, UNSPECIFIED CONSTIPATION TYPE: ICD-10-CM

## 2025-08-21 PROCEDURE — 90715 TDAP VACCINE 7 YRS/> IM: CPT

## 2025-08-21 PROCEDURE — G0009 ADMIN PNEUMOCOCCAL VACCINE: HCPCS

## 2025-08-21 PROCEDURE — 99213 OFFICE O/P EST LOW 20 MIN: CPT | Mod: 25

## 2025-08-21 PROCEDURE — 90677 PCV20 VACCINE IM: CPT

## 2025-08-21 PROCEDURE — 3074F SYST BP LT 130 MM HG: CPT

## 2025-08-21 PROCEDURE — 3078F DIAST BP <80 MM HG: CPT

## 2025-08-21 PROCEDURE — 90472 IMMUNIZATION ADMIN EACH ADD: CPT

## 2025-08-21 RX ORDER — DOCUSATE CALCIUM 240 MG
240 CAPSULE ORAL DAILY
Qty: 90 CAPSULE | Refills: 3 | Status: SHIPPED | OUTPATIENT
Start: 2025-08-21 | End: 2025-08-29

## 2025-08-21 ASSESSMENT — FIBROSIS 4 INDEX: FIB4 SCORE: 1.52

## 2025-08-23 RX ORDER — ASPIRIN 81 MG
1 TABLET, DELAYED RELEASE (ENTERIC COATED) ORAL DAILY
Qty: 90 TABLET | Refills: 3 | Status: SHIPPED | OUTPATIENT
Start: 2025-08-23

## 2025-08-28 ENCOUNTER — OFFICE VISIT (OUTPATIENT)
Dept: SLEEP MEDICINE | Facility: MEDICAL CENTER | Age: 70
End: 2025-08-28
Payer: MEDICARE

## 2025-08-28 VITALS
WEIGHT: 153 LBS | HEIGHT: 63 IN | BODY MASS INDEX: 27.11 KG/M2 | OXYGEN SATURATION: 95 % | HEART RATE: 94 BPM | DIASTOLIC BLOOD PRESSURE: 68 MMHG | SYSTOLIC BLOOD PRESSURE: 114 MMHG

## 2025-08-28 DIAGNOSIS — I26.94 MULTIPLE SUBSEGMENTAL PULMONARY EMBOLI WITHOUT ACUTE COR PULMONALE (HCC): Primary | ICD-10-CM

## 2025-08-28 DIAGNOSIS — Z87.891 FORMER SMOKER: ICD-10-CM

## 2025-08-28 PROCEDURE — 3074F SYST BP LT 130 MM HG: CPT | Performed by: INTERNAL MEDICINE

## 2025-08-28 PROCEDURE — 3078F DIAST BP <80 MM HG: CPT | Performed by: INTERNAL MEDICINE

## 2025-08-28 PROCEDURE — 99204 OFFICE O/P NEW MOD 45 MIN: CPT | Performed by: INTERNAL MEDICINE

## 2025-08-28 PROCEDURE — 99212 OFFICE O/P EST SF 10 MIN: CPT | Performed by: INTERNAL MEDICINE

## 2025-08-28 ASSESSMENT — ENCOUNTER SYMPTOMS
SPUTUM PRODUCTION: 0
COUGH: 0
HEMOPTYSIS: 0
WHEEZING: 0
SHORTNESS OF BREATH: 0

## 2025-08-28 ASSESSMENT — LIFESTYLE VARIABLES
HOW OFTEN DO YOU HAVE A DRINK CONTAINING ALCOHOL: NEVER
SKIP TO QUESTIONS 9-10: 1
AUDIT-C TOTAL SCORE: 0
HOW MANY STANDARD DRINKS CONTAINING ALCOHOL DO YOU HAVE ON A TYPICAL DAY: PATIENT DOES NOT DRINK
HOW OFTEN DO YOU HAVE SIX OR MORE DRINKS ON ONE OCCASION: NEVER

## 2025-08-28 ASSESSMENT — FIBROSIS 4 INDEX: FIB4 SCORE: 1.52

## 2025-08-29 RX ORDER — DOCUSATE SODIUM 100 MG/1
100 CAPSULE, LIQUID FILLED ORAL 2 TIMES DAILY
Qty: 180 CAPSULE | Refills: 3 | Status: SHIPPED | OUTPATIENT
Start: 2025-08-29

## (undated) DEVICE — LACTATED RINGERS INJ 1000 ML - (14EA/CA 60CA/PF)

## (undated) DEVICE — MASK, LARYNGEAL AIRWAY #4

## (undated) DEVICE — SUTURE 3-0 VICRYL PLUS SH - 8X 18 INCH (12/BX)

## (undated) DEVICE — PACK MINOR BASIN - (2EA/CA)

## (undated) DEVICE — SYRINGE 10 ML CONTROL LL (25EA/BX 4BX/CA)

## (undated) DEVICE — SODIUM CHL IRRIGATION 0.9% 1000ML (12EA/CA)

## (undated) DEVICE — TUBING CLEARLINK DUO-VENT - C-FLO (48EA/CA)

## (undated) DEVICE — KIT ROOM DECONTAMINATION

## (undated) DEVICE — SUTURE GENERAL

## (undated) DEVICE — DRAPE LAPAROTOMY T SHEET - (12EA/CA)

## (undated) DEVICE — ELECTRODE DUAL RETURN W/ CORD - (50/PK)

## (undated) DEVICE — GLOVE BIOGEL PI INDICATOR SZ 6.5 SURGICAL PF LF - (50/BX 4BX/CA)

## (undated) DEVICE — GLOVE BIOGEL SZ 7 SURGICAL PF LTX - (50PR/BX 4BX/CA)

## (undated) DEVICE — HEAD HOLDER JUNIOR/ADULT

## (undated) DEVICE — MASK ANESTHESIA ADULT  - (100/CA)

## (undated) DEVICE — GOWN WARMING STANDARD FLEX - (30/CA)

## (undated) DEVICE — NEPTUNE 4 PORT MANIFOLD - (20/PK)

## (undated) DEVICE — ADHESIVE DERMABOND HVD MINI (12EA/BX)

## (undated) DEVICE — ELECTRODE 850 FOAM ADHESIVE - HYDROGEL RADIOTRNSPRNT (50/PK)

## (undated) DEVICE — SLEEVE, VASO, THIGH, MED

## (undated) DEVICE — LEAD SET 6 DISP. EKG NIHON KOHDEN

## (undated) DEVICE — SPONGE GAUZESTER 4 X 4 4PLY - (128PK/CA)

## (undated) DEVICE — SUTURE 4-0 MONOCRYL PLUS PS-2 - 27 INCH (36/BX)

## (undated) DEVICE — STAPLER SKIN DISP - (6/BX 10BX/CA) VISISTAT

## (undated) DEVICE — SUCTION INSTRUMENT YANKAUER BULBOUS TIP W/O VENT (50EA/CA)

## (undated) DEVICE — SPONGE GAUZESTER. 2X2 4-PL - (2/PK 50PK/BX 30BX/CS)

## (undated) DEVICE — SET LEADWIRE 5 LEAD BEDSIDE DISPOSABLE ECG (1SET OF 5/EA)

## (undated) DEVICE — GLOVE BIOGEL PI INDICATOR SZ 7.0 SURGICAL PF LF - (50/BX 4BX/CA)

## (undated) DEVICE — GLOVE BIOGEL INDICATOR SZ 7SURGICAL PF LTX - (50/BX 4BX/CA)

## (undated) DEVICE — PROTECTOR ULNA NERVE - (36PR/CA)

## (undated) DEVICE — GLOVE BIOGEL SZ 8 SURGICAL PF LTX - (50PR/BX 4BX/CA)

## (undated) DEVICE — CANISTER SUCTION 3000ML MECHANICAL FILTER AUTO SHUTOFF MEDI-VAC NONSTERILE LF DISP  (40EA/CA)

## (undated) DEVICE — SENSOR SPO2 NEO LNCS ADHESIVE (20/BX) SEE USER NOTES

## (undated) DEVICE — GLOVE BIOGEL SZ 6.5 SURGICAL PF LTX (50PR/BX 4BX/CA)

## (undated) DEVICE — CHLORAPREP 26 ML APPLICATOR - ORANGE TINT(25/CA)

## (undated) DEVICE — SET EXTENSION WITH 2 PORTS (48EA/CA) ***PART #2C8610 IS A SUBSTITUTE*****

## (undated) DEVICE — BLADE SURGICAL #15 - (50/BX 3BX/CA)

## (undated) DEVICE — KIT ANESTHESIA W/CIRCUIT & 3/LT BAG W/FILTER (20EA/CA)